# Patient Record
(demographics unavailable — no encounter records)

---

## 2022-08-05 NOTE — DIAGNOSTIC IMAGING REPORT
INDICATION: Elbow pain



COMPARISON: 07/21/2022



TECHNIQUE: 3 radiographs of the left elbow dated 08/05/2022



FINDINGS: No acute fracture or dislocation. No destructive

osseous process. No elbow joint effusion. Small olecranon

enthesophytes. No suspicious radiopaque foreign body. No healing

fracture.



IMPRESSION: No acute osseous abnormality.



No healing fracture identified. In particular, previously

suspected medial proximal ulnar fracture is not definitively

demonstrated.



Dictated by: 



  Dictated on workstation # [id number]

## 2022-08-05 NOTE — DIAGNOSTIC IMAGING REPORT
INDICATION: Dysphagia.



TECHNIQUE: The study was performed in conjunction with Speech

Pathology. Video fluoroscopy was performed during the swallowing

of barium at multiple consistencies. Patient ingested nectar,

applesauce, banana, cracker, as well as thin liquid

consistencies. A total of 4.2 minutes of fluoroscopic time was

utilized.



FINDINGS: No laryngeal penetration or aspiration was observed.

There is normal epiglottic tilt and laryngeal elevation. There is

moderate vallecular residue present; however, this did clear with

a second swallow.



IMPRESSION: Moderate residue, as described. No penetration or

aspiration was observed.



Dictated by: 



  Dictated on workstation # [id number]

## 2022-11-01 NOTE — DIAGNOSTIC IMAGING REPORT
INDICATION:  hypoxia.  



TECHNIQUE:  Single view chest 3:53 AM.



CORRELATION STUDY:  05/23/2022



FINDINGS: 

Given patient rotation, heart size, mediastinum and vasculature

overall within normal limits.  

Lung fields hyperinflated but overall clear.



Old healed right posterior lateral rib fracture deformity.

Apparent interval removal of internal fixation hardware of the

left humerus. There is incompletely healed, perhaps ununited

fracture of the proximal left humerus.



IMPRESSION: 

1. Negative for acute cardiopulmonary abnormality.



Dictated by: 



  Dictated on workstation # [id number]

## 2022-11-01 NOTE — CONSULTATION - SURGERY
YOLANDA PETERSON 11/1/22 0725:


History of Present Illness


History of Present Illness


Patient Consulted On(obinna/time)


11/1/22


 07:18


Date Seen by Provider:  Nov 1, 2022


Time Seen by Provider:  08:20


History of Present Illness


Consult requested per Dr. Rodriguez. Patient is a 87 year old female with past 

medical history (according to patient charts) of dysphagia, cognitive 

communication deficit, history of cerebral infarct, A-fib, hypothyroidism, 

vascular dementia presented from nursing facility via EMS yesterday 10-. 

Nursing staff say she has had brown vomit (coffee ground emesis) and brown 

residuals in her feeding tube for last couple of days. She had x-rays which were

reportedly negative per the nursing home. She also had labs which were 

reportedly normal per nursing home. The patient is nonverbal and thus unable to 

obtain a history from her.  She is not on any blood thinning medications per her

records reviewed. As of this morning 11-1-2022 patient is laying in bed awake. 

Unable to answer any questions. She moans in response to questioning. Was able 

to squeeze my hand upon command, but was unable to follow any other commands. 

She did appear to be in mild distress. Nothing in her PEG tube at this time. 

Will be started on vancomycin and zosyn. Patient is currently on breathing mask 

(4 liters).





Allergies and Home Medications


Allergies


Coded Allergies:  


     melatonin (Verified  Allergy, Unknown, 5/22/22)


     quetiapine (Verified  Adverse Reaction, Unknown, 2/11/19)


   confusion





Patient Home Medication List


Home Medication List Reviewed:  Yes


Acetaminophen (Acetaminophen) 500 Mg Tablet, 1,000 MG PO TID, (Reported)


   Entered as Reported by: MEE ROMAN on 11/1/22 0939


   Last Action: Reviewed


Acetaminophen (Acetaminophen) 325 Mg/10.15 Ml Soln, 20.3 ML PO Q6H PRN for PAIN-

MILD (1-4), (Reported)


   Entered as Reported by: MEE ROMAN on 11/1/22 0939


   Last Action: Reviewed


Alendronate Sodium (Alendronate Sodium) 70 Mg Tablet, 70 MG PO FRI, (Reported)


   Entered as Reported by: MEE ROMAN on 5/24/22 0851


   Last Action: Reviewed


Allopurinol (Allopurinol) 100 Mg Tablet, 100 MG PO DAILY, (Reported)


   Entered as Reported by: MEE ROMAN on 11/1/22 0939


   Last Action: Reviewed


Amlodipine Besylate (Amlodipine Besylate) 5 Mg Tablet, 5 MG PO DAILY, (Reported)


   Entered as Reported by: MEE ROMAN on 11/1/22 0939


   Last Action: Reviewed


Ascorbic Acid (Vitamin C) 1,000 Mg Tablet, 1,000 MG PO DAILY, (Reported)


   Entered as Reported by: MEE ROMAN on 11/1/22 0939


   Last Action: Reviewed


Aspirin (Aspirin) 81 Mg Tab.chew, 81 MG PO HS, (Reported)


   Entered as Reported by: MEE ROMAN on 11/1/22 0939


   Last Action: Reviewed


Carboxymethylcellulose Sodium (Refresh Tears) 0.5 % Drops, 1 DROP OU QID, 

(Reported)


   Entered as Reported by: MEE ROMAN on 11/1/22 0939


   Last Action: Reviewed


Cholecalciferol (Vitamin D3) (Vitamin D3) 125 Mcg (5000 Unit) Capsule, 125 MCG 

PO DAILY, (Reported)


   Entered as Reported by: MEE ROMAN on 5/24/22 0851


   Last Action: Reviewed


Diclofenac Sodium (Diclofenac Sodium) 1 % Gel..gram., 2 GM TOP QID PRN for PAIN-

BREAKTHROUGH, (Reported)


   Entered as Reported by: MEE ROMAN on 11/1/22 0939


   Last Action: Reviewed


Doxazosin Mesylate (Doxazosin Mesylate) 1 Mg Tablet, 1 MG PO DAILY, (Reported)


   Entered as Reported by: MEE ROMAN on 11/1/22 0939


   Last Action: Reviewed


Eyelid Cleanser Combination #5 (Ocusoft Lid Scrub) 1 Each Med..pad, 1 EACH TP 

DAILY, (Reported)


   Entered as Reported by: MEE ROMAN on 11/1/22 0939


   Last Action: Reviewed


Lactulose (Lactulose) 10 Gram/15 Ml Solution, 10 GM PO BID, (Reported)


   Entered as Reported by: MEE ROMAN on 11/1/22 0939


   Last Action: Reviewed


Multivitamin (Multivitamin) 1 Each Tablet, 1 EACH PO DAILY, (Reported)


   Entered as Reported by: MEE ROMAN on 5/24/22 0851


   Last Action: Reviewed


Polyethylene Glycol 3350 (Miralax) 17 Gram Powd.pack, 17 GM PO HS, (Reported)


   Entered as Reported by: MEE ROMAN on 5/24/22 0851


   Last Action: Reviewed


Polymyxin B Sulf/Trimethoprim (Polytrim Eye Drops) 10,000 Unit-1 Mg/Ml Drops, 1 

DROP OU TID, (Reported)


   Entered as Reported by: MEE ROMAN on 5/24/22 0851


   Last Action: Reviewed


Quetiapine Fumarate (Quetiapine Fumarate) 25 Mg Tablet, 12.5 MG PO HS, 

(Reported)


   Entered as Reported by: MEE ROMAN on 11/1/22 0939


   Last Action: Reviewed


Sennosides/Docusate Sodium (Senna Plus Tablet) 8.6 Mg-50 Mg Tablet, 1 EACH PO 

Q12H, (Reported)


   Entered as Reported by: MEE ROMAN on 5/24/22 0851


   Last Action: Reviewed


Sulfamethoxazole/Trimethoprim (Bactrim Ds Tablet) 1 Each Tablet, 1 EACH PO BID, 

(Reported)


   Entered as Reported by: MEE ROMAN on 11/1/22 0939


   Last Action: Reviewed


Discontinued Medications


Acetaminophen (Acetaminophen) 325 Mg/10.15 Ml Soln, 10.15 ML PO Q6H PRN for 

PAIN-MILD (1-4), (Reported)


   Discontinued Reason: Prescription changed


   Entered as Reported by: MEE ROMAN on 5/24/22 0851


Amlodipine Besylate (Amlodipine Besylate) 5 Mg Tablet, 5 MG PO DAILY


   Discontinued Reason: Duplicate Order


   Prescribed by: JASON RODRIGUEZ on 5/27/22 1113


   Last Action: Discontinued


Aspirin (Aspirin) 81 Mg Tab.chew, 81 MG PO DAILY


   Discontinued Reason: Duplicate Order


   Prescribed by: JASON RODRIGUEZ on 5/27/22 1113


   Last Action: Discontinued


Carboxymethylcellulose Sodium (Refresh Tears) 0.5 % Drops, 1 DROP OU DAILY, 

(Reported)


   Discontinued Reason: Duplicate Order


   Entered as Reported by: MEE ROMAN on 5/24/22 0851


   Last Action: Discontinued


Doxazosin Mesylate (Doxazosin Mesylate) 1 Mg Tablet, 1 MG PO DAILY


   Discontinued Reason: No Longer Taking


   Prescribed by: JASON RODRIGUEZ on 5/27/22 1113


   Last Action: Discontinued


Oxycodone HCl (Oxycodone HCl) 5 Mg/5 Ml Solution, 5 ML GT Q4H PRN for PAIN-

SEVERE (8-10)


   Discontinued Reason: No Longer Taking


   Prescribed by: JASON RODRIGUEZ on 5/27/22 1114


   Last Action: Discontinued





Past Medical-Social-Family Hx


Patient Social History


Smoking Status:  Never a Smoker


Recent Hopitalizations:  No


Alcohol Use?:  No


Have you traveled recently?:  No





Immunizations Up To Date


Tetanus Booster (TDap):  Unknown


PED Vaccines UTD:  No


Date of Pneumonia Vaccine:  Oct 1, 2017


Date of Influenza Vaccine:  Oct 1, 2017





Seasonal Allergies


Seasonal Allergies:  No





Surgeries


History of Surgeries:  Yes


Surgeries:  Abdominal





Respiratory


History of Respiratory Disorde:  No





Cardiovascular


History of Cardiac Disorders:  Yes (SEES DR. BUSBY UNSURE WHY)


Cardiac Disorders:  Atrial Fibrillation, Hypertension





Neurological


History of Neurological Disord:  Yes


Neurological Disorders:  Dementia (Vascular), TIA





Reproductive System


Hx Reproductive Disorders:  No


Sexually Transmitted Disease:  No


HIV/AIDS:  No


Female Reproductive Disorders:  Denies





Genitourinary


History of Genitourinary Disor:  Yes


Genitourinary Disorders:  UTI-Chronic





Gastrointestinal


History of Gastrointestinal Di:  No





Musculoskeletal


History of Musculoskeletal Dis:  Yes


Musculoskeletal Disorders:  Fractures, Gout





Endocrine


History of Endocrine Disorders:  Yes


Endocrine Disorders:  Hypothyroidsim





HEENT


History of HEENT Disorders:  Yes


HEENT Disorders:  Cataract


Loss of Vision:  Denies


Hearing Impairment:  Hard of Hearing





Cancer


History of Cancer:  No





Psychosocial


History of Psychiatric Problem:  No


Behavioral Health Disorders:  Depression





Integumentary


History of Skin or Integumenta:  No


Skin/Integumentary Disorders:  Recent Skin Changes





Blood Transfusions


History of Blood Disorders:  No


Adverse Reaction to a Blood Tr:  No





Family Medical History


Significant Family History:  No Pertinent Family Hx, Diabetes, Psychiatric 

Problems


Family Medial History:  


Dementia


  G8 SISTER


Diabetes mellitus


  19 FATHER





Review of Systems-General


ROS-Unable to Obtain:  Unable to obtain at this time due to patient being non 

verbal





Physical Exam-General Problems


Physical Exam


Vital Signs





Vital Signs - First Documented








 11/1/22 11/1/22





 02:44 04:52


 


Temp 36.7 


 


Pulse 103 


 


Resp 20 


 


B/P (MAP) 120/86 (97) 


 


Pulse Ox 77 


 


O2 Delivery Room Air 


 


O2 Flow Rate  5.00





Capillary Refill : Less Than 3 Seconds


General Appearance:  moderate distress, cachetic, thin


Eyes:  Bilateral Eye Conjunctivae Pale, Bilateral Eye Lid Inflammation, 

Bilateral Eye Other (Extra occular movment not fully intact, reduced horizontal 

movement)


HEENT:  No scleral icterus (R), No scleral icterus (L), No pale conjunctivae 

(R), No pale conjunctivae (L)


Neck:  non-tender, supple


Respiratory:  chest non-tender, lungs clear, respiratory distress (Mild)


Cardiovascular:  regular rate, rhythm, no murmur, systolic murmur (3/6 

holosystolic murmur)


Peripheral Pulses:  2+ Radial Pulses (R), 2+ Radial Pulses (L)


Gastrointestinal:  normal bowel sounds, soft, other (Patient did appear tender 

to palpation of abdomen, PEG tube dry and clean)


Rectal:  deferred


Back:  no CVA tenderness; No vertebral tenderness


Extremities:  non-tender, no pedal edema


Neurologic/Psychiatric:  motor weakness, depressed affect, disoriented x 3, 

other (Non verbal, moans in response to questioning )


Skin:  normal color, warm/dry


Lymphatic:  No no adenopathy





Data Review


Labs


Laboratory Tests


11/1/22 02:54: 


Blood Gas Puncture Site LR, Blood Gas Patient Temperature 36.7, Arterial Blood 

pH 7.44H, Arterial Blood Partial Pressure CO2 44, Arterial Blood Partial 

Pressure O2 50L, Arterial Blood HCO3 30H, Arterial Blood Total CO2 31.0, 

Arterial Blood Oxygen Saturation 85L, Arterial Blood Base Excess 5.5H, Jeremi 

Test YES-POS, Blood Gas Ventilator Setting NO, Blood Gas Inspired Oxygen 6L


11/1/22 03:10: 


White Blood Count 20.9H, Red Blood Count 3.72L, Hemoglobin 10.9L, Hematocrit 34L

, Mean Corpuscular Volume 90, Mean Corpuscular Hemoglobin 29, Mean Corpuscular 

Hemoglobin Concent 32, Red Cell Distribution Width 18.5H, Platelet Count 242, 

Mean Platelet Volume 9.9, Immature Granulocyte % (Auto) 1, Neutrophils (%) 

(Auto) 86H, Lymphocytes (%) (Auto) 8L, Monocytes (%) (Auto) 6, Eosinophils (%) 

(Auto) 0, Basophils (%) (Auto) 0, Neutrophils # (Auto) 18.0H, Lymphocytes # 

(Auto) 1.6, Monocytes # (Auto) 1.2H, Eosinophils # (Auto) 0.0, Basophils # (Auto

) 0.0, Immature Granulocyte # (Auto) 0.1, Neutrophils % (Manual) 84, Lymphocytes

% (Manual) 9, Monocytes % (Manual) 7, Blood Morphology Comment NORMAL, 

Prothrombin Time 12.7, INR Comment 0.9, Sodium Level 139, Potassium Level 4.2, 

Chloride Level 100, Carbon Dioxide Level 24, Anion Gap 15H, Blood Urea Nitrogen 

62H, Creatinine 1.78H, Estimat Glomerular Filtration Rate 27, BUN/Creatinine 

Ratio 35, Glucose Level 118H, Calcium Level 14.2*H, Corrected Calcium 14.6H, 

Total Bilirubin 0.6, Aspartate Amino Transf (AST/SGOT) 41H, Alanine 

Aminotransferase (ALT/SGPT) 174H, Alkaline Phosphatase 329H, Total Protein 7.1, 

Albumin 3.5


11/1/22 04:09: 


D-Dimer 2.41H, Procalcitonin 0.94H, Influenza Type A (RT-PCR) Not Detected, 

Influenza Type B (RT-PCR) Not Detected, SARS-CoV-2 RNA (RT-PCR) Not Detected


11/1/22 04:33: 


Urine Color YELLOW, Urine Clarity TURBID, Urine pH 5.0, Urine Specific Gravity 

>=1.030, Urine Protein TRACEH, Urine Glucose (UA) NEGATIVE, Urine Ketones 

NEGATIVE, Urine Nitrite NEGATIVE, Urine Bilirubin NEGATIVE, Urine Urobilinogen 

0.2, Urine Leukocyte Esterase 2+H, Urine RBC (Auto) TRACE-IH, Urine RBC 10-25H, 

Urine WBC >100H, Urine Squamous Epithelial Cells 2-5, Urine Crystals PRESENTH, 

Urine Calcium Oxalate Crystals RAREH, Urine Bacteria FEWH, Urine Casts NONE, 

Urine Mucus SMALLH, Urine Other , Urine Culture Indicated YES





Radiology


Date of Exam:11/01/22





CT ABDOMEN/PELVIS WO








PROCEDURE: CT abdomen and pelvis without contrast.





TECHNIQUE: Multiple contiguous axial images were obtained through


the abdomen and pelvis without the use of intravenous contrast.


Auto Exposure Controls were utilized during the CT exam to meet


ALARA standards for radiation dose reduction. 





INDICATION:  87-year-old female, diffuse abdominal pain, vomiting


black liquid.





CORRELATION STUDY: CT abdomen and pelvis 02/11/2019





FINDINGS:  


LOWER THORAX: Mild tree-in-bud nodularity suggested at the lung


bases. Subpleural nodule right middle lobe approximately 4 mm,


previously 3 mm.





LIVER: Unremarkable at unenhanced assessment.


GALLBLADDER: Small gallstones, otherwise unremarkable. No overt


bile duct dilatation.


SPLEEN: Calcified granuloma, otherwise unremarkable.


PANCREAS: Mildly atrophic.


ADRENAL GLANDS: Unremarkable.


KIDNEYS: Normal configuration.  No calcification or obstruction.


ABDOMINAL AORTA: Moderate wall calcification, nonaneurysmal.





GASTROINTESTINAL TRACT: Gastric feeding tube has been placed


since prior. No small bowel obstruction. Moderate stool in the


proximal colon. Colonic diverticulosis. No abdominal ascites


and/or free air.





URINARY BLADDER: Unremarkable.


REPRODUCTIVE: Uterus and adnexa unremarkable.





OSSEOUS STRUCTURES: Mild anterior wedging T12. Old pubic rami


fractures. Chronic bilateral rib fractures. Chronic left


transverse process fractures at L1-L2.





OTHER:  None.








IMPRESSION:


1. Very mild tree-in-bud nodularity lung bases suspect for


atypical type infection. Very small area of right middle lobe


pulmonary nodule.


2. Interval placement of a gastric feeding tube. No


gastrointestinal tract obstruction.





Assessment/Plan


Possible GI bleed


   Monitor and trend hemoglobin


   Transfuse if needed


   Continue protonix


   Continue with clear liquid diet





Hypoxia


   Continue with oxygen mask (currently on 4 liters)





Leukocytosis


   Started on Vancomycin and Zosyn


   CT showed Tree in Bud nodularity at lung bases





NICKY


   Continue hydration





Elevated Procalcitonin 


PEG tube aspiration


Elevated Liver enzymes


Vascular Dementia-non verbal





Clinical Quality Measures


DVT/VTE Risk/Contraindication:


Contraindications-Pharm:  Other *list below*


Other:  


gi bleed





EULOGIO STEWARD DO 11/1/22 1633:


History of Present Illness


Consult requested by Dr. Rodriguez for possible GI bleed.  Patient 87-year-old 

female who is unable to communicate with me.  This is secondary to dementia.  

Her oldest son is at bedside.  Patient nursing staff states that she has having 

some coffee-ground emesis and also some coming out of her feeding tube the last 

couple of days.  She is unable to provide any information.  Her son states that 

she is also having a little bit of difficulty breathing and was found to be 

hypoxic but with oxygen this did improve.  Patient unable to pass swallow test. 

Ct abdomen/pelvis: 1. Very mild tree-in-bud nodularity lung bases suspect for


atypical type infection. Very small area of right middle lobe


pulmonary nodule.


2. Interval placement of a gastric feeding tube. No


gastrointestinal tract obstruction..





Allergies and Home Medications


Allergies


Coded Allergies:  


     melatonin (Verified  Allergy, Unknown, 5/22/22)


     quetiapine (Verified  Adverse Reaction, Unknown, 2/11/19)


   confusion





Patient Home Medication List


Home Medication List Reviewed:  Yes


Acetaminophen (Acetaminophen) 500 Mg Tablet, 1,000 MG PO TID, (Reported)


   Entered as Reported by: MEE ROMAN on 11/1/22 0939


   Last Action: Reviewed


Acetaminophen (Acetaminophen) 325 Mg/10.15 Ml Soln, 20.3 ML PO Q6H PRN for PAIN-

MILD (1-4), (Reported)


   Entered as Reported by: MEE ROMAN on 11/1/22 0939


   Last Action: Reviewed


Alendronate Sodium (Alendronate Sodium) 70 Mg Tablet, 70 MG PO FRI, (Reported)


   Entered as Reported by: MEE ROMAN on 5/24/22 0851


   Last Action: Reviewed


Allopurinol (Allopurinol) 100 Mg Tablet, 100 MG PO DAILY, (Reported)


   Entered as Reported by: MEE ROMAN on 11/1/22 0939


   Last Action: Reviewed


Amlodipine Besylate (Amlodipine Besylate) 5 Mg Tablet, 5 MG PO DAILY, (Reported)


   Entered as Reported by: MEE ROMAN on 11/1/22 0939


   Last Action: Reviewed


Ascorbic Acid (Vitamin C) 1,000 Mg Tablet, 1,000 MG PO DAILY, (Reported)


   Entered as Reported by: MEE ROMAN on 11/1/22 0939


   Last Action: Reviewed


Aspirin (Aspirin) 81 Mg Tab.chew, 81 MG PO HS, (Reported)


   Entered as Reported by: MEE ROMAN on 11/1/22 0939


   Last Action: Reviewed


Carboxymethylcellulose Sodium (Refresh Tears) 0.5 % Drops, 1 DROP OU QID, 

(Reported)


   Entered as Reported by: MEE ROMAN on 11/1/22 0939


   Last Action: Reviewed


Cholecalciferol (Vitamin D3) (Vitamin D3) 125 Mcg (5000 Unit) Capsule, 125 MCG 

PO DAILY, (Reported)


   Entered as Reported by: MEE ROMAN on 5/24/22 0851


   Last Action: Reviewed


Diclofenac Sodium (Diclofenac Sodium) 1 % Gel..gram., 2 GM TOP QID PRN for PAIN-

BREAKTHROUGH, (Reported)


   Entered as Reported by: MEE ROMAN on 11/1/22 0939


   Last Action: Reviewed


Doxazosin Mesylate (Doxazosin Mesylate) 1 Mg Tablet, 1 MG PO DAILY, (Reported)


   Entered as Reported by: MEE ROMAN on 11/1/22 0939


   Last Action: Reviewed


Eyelid Cleanser Combination #5 (Ocusoft Lid Scrub) 1 Each Med..pad, 1 EACH TP 

DAILY, (Reported)


   Entered as Reported by: MEE ROMAN on 11/1/22 0939


   Last Action: Reviewed


Lactulose (Lactulose) 10 Gram/15 Ml Solution, 10 GM PO BID, (Reported)


   Entered as Reported by: MEE ROMAN on 11/1/22 0939


   Last Action: Reviewed


Multivitamin (Multivitamin) 1 Each Tablet, 1 EACH PO DAILY, (Reported)


   Entered as Reported by: MEE ROMAN on 5/24/22 0851


   Last Action: Reviewed


Polyethylene Glycol 3350 (Miralax) 17 Gram Powd.pack, 17 GM PO HS, (Reported)


   Entered as Reported by: MEE ROMAN on 5/24/22 0851


   Last Action: Reviewed


Polymyxin B Sulf/Trimethoprim (Polytrim Eye Drops) 10,000 Unit-1 Mg/Ml Drops, 1 

DROP OU TID, (Reported)


   Entered as Reported by: MEE ROMAN on 5/24/22 0851


   Last Action: Reviewed


Quetiapine Fumarate (Quetiapine Fumarate) 25 Mg Tablet, 12.5 MG PO HS, 

(Reported)


   Entered as Reported by: MEE ROMAN on 11/1/22 0939


   Last Action: Reviewed


Sennosides/Docusate Sodium (Senna Plus Tablet) 8.6 Mg-50 Mg Tablet, 1 EACH PO 

Q12H, (Reported)


   Entered as Reported by: MEE ROMAN on 5/24/22 0851


   Last Action: Reviewed


Sulfamethoxazole/Trimethoprim (Bactrim Ds Tablet) 1 Each Tablet, 1 EACH PO BID, 

(Reported)


   Entered as Reported by: MEE ROMAN on 11/1/22 0939


   Last Action: Reviewed


Discontinued Medications


Acetaminophen (Acetaminophen) 325 Mg/10.15 Ml Soln, 10.15 ML PO Q6H PRN for 

PAIN-MILD (1-4), (Reported)


   Discontinued Reason: Prescription changed


   Entered as Reported by: MEE ROMAN on 5/24/22 0851


Amlodipine Besylate (Amlodipine Besylate) 5 Mg Tablet, 5 MG PO DAILY


   Discontinued Reason: Duplicate Order


   Prescribed by: JASON RODRIGUEZ on 5/27/22 1113


   Last Action: Discontinued


Aspirin (Aspirin) 81 Mg Tab.chew, 81 MG PO DAILY


   Discontinued Reason: Duplicate Order


   Prescribed by: JASON RODRIGUEZ on 5/27/22 1113


   Last Action: Discontinued


Carboxymethylcellulose Sodium (Refresh Tears) 0.5 % Drops, 1 DROP OU DAILY, 

(Reported)


   Discontinued Reason: Duplicate Order


   Entered as Reported by: MEE ROMAN on 5/24/22 0851


   Last Action: Discontinued


Doxazosin Mesylate (Doxazosin Mesylate) 1 Mg Tablet, 1 MG PO DAILY


   Discontinued Reason: No Longer Taking


   Prescribed by: JASON RODRIGUEZ on 5/27/22 1113


   Last Action: Discontinued


Oxycodone HCl (Oxycodone HCl) 5 Mg/5 Ml Solution, 5 ML GT Q4H PRN for PAIN-

SEVERE (8-10)


   Discontinued Reason: No Longer Taking


   Prescribed by: JASON RODRIGUEZ on 5/27/22 1114


   Last Action: Discontinued





Past Medical-Social-Family Hx


Reviewed Nursing Assessment


Reviewed/Agree w Nursing PMH:  Yes





Family Medical History


Significant Family History:  No Pertinent Family Hx


Family Medial History:  


Dementia


  G8 SISTER


Diabetes mellitus


  19 FATHER





Review of Systems-General


ROS-Unable to Obtain:  unable to obtaine due to patient condition





Physical Exam-General Problems


Physical Exam


General Appearance:  cachetic, thin (laying in bed); 


   No moderate distress


HEENT:  PERRL/EOMI; No scleral icterus (R), No scleral icterus (L)


Neck:  non-tender, supple


Respiratory:  chest non-tender, no respiratory distress, no accessory muscle use


Cardiovascular:  regular rate, rhythm, no JVD


Gastrointestinal:  soft, other (gastrostomy tube luq)


Rectal:  deferred


Back:  no CVA tenderness, no vertebral tenderness; No vertebral tenderness


Extremities:  non-tender, no pedal edema


Neurologic/Psychiatric:  alert, motor weakness, disoriented x 3, other (Non 

verbal, moans in response to questioning )


Skin:  normal color, warm/dry


Lymphatic:  No no adenopathy





Assessment/Plan


Hypoxia


GI bleed likely upper 


Atypical lung infection b/l


Dementia-vascular





Patient no active signs of bleeding except some black output from gastrostomy 

tube.  


Protonix


Elevated procalcitonin and lung base changes likely  atypical infection- 

continue abx,  consider Ct of chest if not improving


Elevated liver enzymes and leukocytosis, repeat labs and follow


No surgical intervention at this time. .





Supervisory-Addendum Brief


Verification & Attestation


Participated in pt care:  history, MDM, physical


Personally performed:  exam, history, MDM, supervision of care


Care discussed with:  Medical Student


Procedures:  n/a


Results interpretation:  Verified all documentation


Verification and Attestation of Medical Student E/M Service





A medical student performed and documented this service in my presence. I 

reviewed and verified all information documented by the medical student and made

modifications to such information, when appropriate. I personally performed the 

physical exam and medical decision making. 





 Eulogio Steward, Nov 1, 2022,16:41











YOLANDA PETERSON                 Nov 1, 2022 07:25


EULOGIO STEWARD DO               Nov 1, 2022 16:33

## 2022-11-01 NOTE — ED GI
General


Chief Complaint:  Abdominal/GI Problems


Stated Complaint:  VOMITING BLACK LIQUID


Source of Information:  EMS





History of Present Illness


Date Seen by Provider:  Nov 1, 2022


Time Seen by Provider:  02:50


Initial Comments


87-year-old female presents wanting nursing facility via EMS.  Nursing staff say

she has had brown vomit and brown residuals in her feeding tube for last couple 

of days.  She had x-rays which were reportedly negative.  She is also had labs 

which were reportedly normal.  The patient is nonverbal so unable to obtain a 

history from her.  She is not on any blood thinning medications per her records 

reviewed





Allergies and Home Medications


Allergies


Coded Allergies:  


     melatonin (Verified  Allergy, Unknown, 5/22/22)


     quetiapine (Verified  Adverse Reaction, Unknown, 2/11/19)


   confusion





Patient Home Medication List


Home Medication List Reviewed:  Yes


Acetaminophen (Acetaminophen) 325 Mg/10.15 Ml Soln, 10.15 ML PO Q6H PRN for 

PAIN-MILD (1-4), (Reported)


   Entered as Reported by: MEE ROMAN on 5/24/22 0851


Alendronate Sodium (Alendronate Sodium) 70 Mg Tablet, 70 MG PO FRI, (Reported)


   Entered as Reported by: MEE ROMAN on 5/24/22 0851


Amlodipine Besylate (Amlodipine Besylate) 5 Mg Tablet, 5 MG PO DAILY


   Prescribed by: JASON RODRIGUEZ on 5/27/22 1113


Aspirin (Aspirin) 81 Mg Tab.chew, 81 MG PO DAILY


   Prescribed by: JASON RODRIGUEZ on 5/27/22 1113


Carboxymethylcellulose Sodium (Refresh Tears) 0.5 % Drops, 1 DROP OU DAILY, 

(Reported)


   Entered as Reported by: MEE ROMAN on 5/24/22 0851


Cholecalciferol (Vitamin D3) (Vitamin D3) 125 Mcg (5000 Unit) Capsule, 125 MCG 

PO DAILY, (Reported)


   Entered as Reported by: MEE ROMAN on 5/24/22 0851


Doxazosin Mesylate (Doxazosin Mesylate) 1 Mg Tablet, 1 MG PO DAILY


   Prescribed by: JASON RODRIGUEZ on 5/27/22 1113


Multivitamin (Multivitamin) 1 Each Tablet, 1 EACH PO DAILY, (Reported)


   Entered as Reported by: MEE ROMAN on 5/24/22 0851


Oxycodone HCl (Oxycodone HCl) 5 Mg/5 Ml Solution, 5 ML GT Q4H PRN for PAIN-

SEVERE (8-10)


   Prescribed by: JASON RODRIGUEZ on 5/27/22 1114


Polyethylene Glycol 3350 (Miralax) 17 Gram Powd.pack, 17 GM PO HS, (Reported)


   Entered as Reported by: MEE ROMAN on 5/24/22 0851


Polymyxin B Sulf/Trimethoprim (Polytrim Eye Drops) 10,000 Unit-1 Mg/Ml Drops, 1 

DROP OU TID, (Reported)


   Entered as Reported by: MEE ROMAN on 5/24/22 0851


Sennosides/Docusate Sodium (Senna Plus Tablet) 8.6 Mg-50 Mg Tablet, 1 EACH PO 

BID, (Reported)


   Entered as Reported by: MEE ROMAN on 5/24/22 0851





Review of Systems


Review of Systems


Constitutional:  other (Unable to obtain review of systems as patient is 

nonverbal)





Past Medical-Social-Family Hx


Patient Social History


Tobacco Use?:  No


Use of E-Cig and/or Vaping dev:  No


Substance use?:  No


Alcohol Use?:  No





Immunizations Up To Date


Tetanus Booster (TDap):  Unknown


PED Vaccines UTD:  No


First/Initial COVID19 Vaccinat:  YES


Second COVID19 Vaccination Brayden:  YES


Third COVID19 Vaccination Date:  YES





Seasonal Allergies


Seasonal Allergies:  No





Past Medical History


Surgery/Hospitalization HX:  


DEMENTIA, A-FIB,CAD, HTN, HYPOTHYROIDISM, HYPERLIPIDEMIA


Surgeries:  Yes


Abdominal


Respiratory:  No


Currently Using CPAP:  No


Currently Using BIPAP:  No


Cardiac:  Yes (SEES DR. BUSBY UNSURE WHY)


Hypertension


Neurological:  Yes


Dementia


Reproductive Disorders:  No


Female Reproductive Disorders:  Denies


Sexually Transmitted Disease:  No


HIV/AIDS:  No


Genitourinary:  Yes


UTI-Chronic


Gastrointestinal:  No


Musculoskeletal:  Yes


Fractures, Gout


Endocrine:  Yes


Hyperthyroidism


HEENT:  Yes


Cataract


Loss of Vision:  Denies


Hearing Impairment:  Hard of Hearing


Cancer:  No


Psychosocial:  No


Integumentary:  No


Recent Skin Changes


Blood Disorders:  No


Adverse Reaction/Blood Tranf:  No





Family Medical History


Reviewed Nursing Family Hx





Dementia


  G8 SISTER


Diabetes mellitus


  19 FATHER


No Pertinent Family Hx, Diabetes, Psychiatric Problems





Physical Exam


Vital Signs





Vital Signs - First Documented








 11/1/22 11/1/22





 02:44 04:52


 


Temp 36.7 


 


Pulse 103 


 


Resp 20 


 


B/P (MAP) 120/86 (97) 


 


Pulse Ox 77 


 


O2 Delivery Room Air 


 


O2 Flow Rate  5.00





Capillary Refill :


Height/Weight/BMI


Height: 5'4.00"


Weight: 117lbs. 8.0oz. [id number]; 21.00 BMI


Method:Stated


General Appearance:  no apparent distress


HEENT:  PERRL/EOMI, normal ENT inspection, pharynx normal


Neck:  non-tender, supple, normal inspection


Respiratory:  chest non-tender, lungs clear, normal breath sounds, no 

respiratory distress


Cardiovascular:  regular rate, rhythm, systolic murmur (3/6 holosystolic murmur 

best at left sternal border)


Gastrointestinal:  normal bowel sounds, soft, no organomegaly, other (Feeding 

tube in mid abdomen with clear and black drainage.  Patient appears to have 

diffuse tenderness with voluntary guarding about her abdomen.)


Extremities:  normal range of motion, non-tender, normal inspection, no calf 

tenderness


Neurologic/Psychiatric:  other (Patient is nonverbal.  She is alert)


Skin:  normal color, warm/dry


Lymphatic:  no adenopathy





Procedures/Interventions





   Suture Size:  5-0





Progress/Results/Core Measures


Results/Orders


Lab Results





Laboratory Tests








Test


 11/1/22


02:54 11/1/22


03:10 11/1/22


04:09 11/1/22


04:33 Range/Units


 


 


Blood Gas Puncture Site LR      


 


Blood Gas Patient Temperature 36.7      


 


Arterial Blood pH 7.44 H    7.37-7.43  


 


Arterial Blood Partial


Pressure CO2 44 


 


 


 


 35-45  MMHG





 


Arterial Blood Partial


Pressure O2 50 L


 


 


 


 79-93  MMHG





 


Arterial Blood HCO3 30 H    23-27  MMOL/L


 


Arterial Blood Total CO2


 31.0 


 


 


 


 21.0-31.0


MMOL/L


 


Arterial Blood Oxygen


Saturation 85 L


 


 


 


   %





 


Arterial Blood Base Excess


 5.5 H


 


 


 


 -2.5-2.5


MMOL/L


 


Jeremi Test YES-POS      


 


Blood Gas Ventilator Setting NO      


 


Blood Gas Inspired Oxygen 6L      


 


White Blood Count


 


 20.9 H


 


 


 4.3-11.0


10^3/uL


 


Red Blood Count


 


 3.72 L


 


 


 3.80-5.11


10^6/uL


 


Hemoglobin  10.9 L   11.5-16.0  g/dL


 


Hematocrit  34 L   35-52  %


 


Mean Corpuscular Volume  90    80-99  fL


 


Mean Corpuscular Hemoglobin  29    25-34  pg


 


Mean Corpuscular Hemoglobin


Concent 


 32 


 


 


 32-36  g/dL





 


Red Cell Distribution Width  18.5 H   10.0-14.5  %


 


Platelet Count


 


 242 


 


 


 130-400


10^3/uL


 


Mean Platelet Volume  9.9    9.0-12.2  fL


 


Immature Granulocyte % (Auto)  1     %


 


Neutrophils (%) (Auto)  86 H   42-75  %


 


Lymphocytes (%) (Auto)  8 L   12-44  %


 


Monocytes (%) (Auto)  6    0-12  %


 


Eosinophils (%) (Auto)  0    0-10  %


 


Basophils (%) (Auto)  0    0-10  %


 


Neutrophils # (Auto)


 


 18.0 H


 


 


 1.8-7.8


10^3/uL


 


Lymphocytes # (Auto)


 


 1.6 


 


 


 1.0-4.0


10^3/uL


 


Monocytes # (Auto)


 


 1.2 H


 


 


 0.0-1.0


10^3/uL


 


Eosinophils # (Auto)


 


 0.0 


 


 


 0.0-0.3


10^3/uL


 


Basophils # (Auto)


 


 0.0 


 


 


 0.0-0.1


10^3/uL


 


Immature Granulocyte # (Auto)


 


 0.1 


 


 


 0.0-0.1


10^3/uL


 


Neutrophils % (Manual)  84     %


 


Lymphocytes % (Manual)  9     %


 


Monocytes % (Manual)  7     %


 


Blood Morphology Comment  NORMAL     


 


Prothrombin Time  12.7    12.2-14.7  SEC


 


INR Comment  0.9    0.8-1.4  


 


Sodium Level  139    135-145  MMOL/L


 


Potassium Level  4.2    3.6-5.0  MMOL/L


 


Chloride Level  100      MMOL/L


 


Carbon Dioxide Level  24    21-32  MMOL/L


 


Anion Gap  15 H   5-14  MMOL/L


 


Blood Urea Nitrogen  62 H   7-18  MG/DL


 


Creatinine


 


 1.78 H


 


 


 0.60-1.30


MG/DL


 


Estimat Glomerular Filtration


Rate 


 27 


 


 


  





 


BUN/Creatinine Ratio  35     


 


Glucose Level  118 H     MG/DL


 


Calcium Level  14.2 *H   8.5-10.1  MG/DL


 


Corrected Calcium  14.6 H   8.5-10.1  MG/DL


 


Total Bilirubin  0.6    0.1-1.0  MG/DL


 


Aspartate Amino Transf


(AST/SGOT) 


 41 H


 


 


 5-34  U/L





 


Alanine Aminotransferase


(ALT/SGPT) 


 174 H


 


 


 0-55  U/L





 


Alkaline Phosphatase  329 H     U/L


 


Total Protein  7.1    6.4-8.2  GM/DL


 


Albumin  3.5    3.2-4.5  GM/DL


 


D-Dimer


 


 


 2.41 H


 


 0.00-0.49


UG/ML


 


Procalcitonin   0.94 H  <0.10  NG/ML


 


Influenza Type A (RT-PCR)   Not Detected   Not Detecte  


 


Influenza Type B (RT-PCR)   Not Detected   Not Detecte  


 


SARS-CoV-2 RNA (RT-PCR)   Not Detected   Not Detecte  


 


Urine Color    YELLOW   


 


Urine Clarity    TURBID   


 


Urine pH    5.0  5-9  


 


Urine Specific Gravity    >=1.030  1.016-1.022  


 


Urine Protein    TRACE H NEGATIVE  


 


Urine Glucose (UA)    NEGATIVE  NEGATIVE  


 


Urine Ketones    NEGATIVE  NEGATIVE  


 


Urine Nitrite    NEGATIVE  NEGATIVE  


 


Urine Bilirubin    NEGATIVE  NEGATIVE  


 


Urine Urobilinogen    0.2  < = 1.0  MG/DL


 


Urine Leukocyte Esterase    2+ H NEGATIVE  


 


Urine RBC (Auto)    TRACE-I H NEGATIVE  


 


Urine RBC    10-25 H  /HPF


 


Urine WBC    >100 H  /HPF


 


Urine Squamous Epithelial


Cells 


 


 


 2-5 


  /HPF





 


Urine Crystals    PRESENT H  /LPF


 


Urine Calcium Oxalate Crystals    RARE H  /LPF


 


Urine Bacteria    FEW H  /HPF


 


Urine Casts    NONE   /LPF


 


Urine Mucus    SMALL H  /LPF


 


Urine Other       /HPF


 


Urine Culture Indicated    YES   








My Orders





Orders - JULIETA WALTER DO


Comprehensive Metabolic Panel (11/1/22 02:51)


Abo Rh Type (11/1/22 02:51)


Cbc With Automated Diff (11/1/22 02:51)


Arterial Blood Gas (11/1/22 02:52)


Chest 1 View, Ap/Pa Only (11/1/22 02:52)


Protime With Inr (11/1/22 03:01)


Ct Abdomen/Pelvis Wo (11/1/22 03:01)


Manual Differential (11/1/22 03:10)


Covid 19 Inhouse Test (11/1/22 04:00)


Influenza A And B By Pcr (11/1/22 04:00)


Ua Culture If Indicated (11/1/22 04:03)


Fibrin Degradation Products (11/1/22 04:06)


Procalcitonin (Pct) (11/1/22 04:06)


Vancomycin Injection (Vancomycin Injecti (11/1/22 04:15)


Ns Iv 500 Ml (Sodium Chloride 0.9%) (11/1/22 04:15)


Aguirre Cath (11/1/22 04:13)


Lidocaine 2% (Urojet) (Xylocaine Urojet) (11/1/22 04:15)


Ed Admission (Communication) (11/1/22 04:39)


Urine Culture (11/1/22 04:33)





Medications Given in ED





Current Medications








 Medications  Dose


 Ordered  Sig/Familia


 Route  Start Time


 Stop Time Status Last Admin


Dose Admin


 


 Lidocaine HCl  10 ml  ONCE  ONCE


 TOP  11/1/22 04:15


 11/1/22 04:17 DC 11/1/22 04:25


10 ML


 


 Vancomycin HCl


 1000 mg/Sodium


 Chloride  250 ml @ 


 250 mls/hr  ONCE  ONCE


 IV  11/1/22 04:15


 11/1/22 05:14  11/1/22 04:25


250 MLS/HR








Vital Signs/I&O











 11/1/22 11/1/22





 02:44 04:52


 


Temp 36.7 


 


Pulse 103 97


 


Resp 20 20


 


B/P (MAP) 120/86 (97) 145/84


 


Pulse Ox 77 96


 


O2 Delivery Room Air OxyMask


 


O2 Flow Rate  5.00











Departure


Communication (Admissions)


Patient's hypoxia improved with a simple mask.  Chest x-ray improved tree-in-bud

infiltrates in the bases bilaterally.  Started on Zosyn, vancomycin especially 

in light of her significant leukocytosis.  Her abdomen is tender on exam, she is

nonverbal so I would not do a CT scan which is negative for any acute intra-

abdominal pathology.  She does have some black fluid in her feeding tube when 

aspirating.  It does appear to be mixed with clear stomach contents.  Unsure of 

the significance at this time.  Related to Dr. Rodriguez and otherwise stable 

condition.





Impression





   Primary Impression:  


   Hypoxia


   Additional Impression:  


   Leukocytosis


   Qualified Codes:  D72.829 - Elevated white blood cell count, unspecified


Disposition:  09 ADMITTED AS INPATIENT


Condition:  Stable





Admissions


Decision to Admit Reason:  Admit from ER (General)





Departure-Patient Inst.


Referrals:  


NICOLASA MACHADO DO (PCP/Family)


Primary Care Physician











JULIETA WALTER DO             Nov 1, 2022 03:01

## 2022-11-01 NOTE — SPEECH THERAPY PROGRESS NOTE
Therapy Progress Note


Speech pathology received clinical bedside swallowing evaluation consultation 

and the chart was reviewed. At this time the patient is receiving supplemental 

oxygen at a rate of 4L via oxy-mask. The patient intermittently makes eye 

contact with the clinician when her name is spoken and moves continuously in the

bed. Due to the patient's current presentation, the clinician is not comfortable

evaluating the patient or providing P.O. trials at this time. The patient's RN 

stated she would attempt to transfer the patient to nasal cannula and get the 

patient seated upright to assess for stability.





Prior to P.O. attempts, the patient will need to be tolerating nasal cannula. 

The clinician provided the RN with the information. The RN requested information

on how the patient should receive her medication. At this time, the clinician 

would recommend the patient remain N.P.O. due to documented concern for 

aspiration. The patient does have a PEG tube present. If deemed appropriate for 

use by the physician, the clinician would recommend utilization of the PEG tube.





The patient has a suspected family member at bedside and the patient's speech 

language pathologist from her facility ([facility]). The patient's 

speech language pathologist from her facility is asking multiple questions of 

the RN. This clinician is not addressed throughout her time with the patient. 

The family member present does not ask questions. This clinician will re-attempt

the assessment as the patient is appropriate.











JESSE CARLOS                Nov 1, 2022 10:09

## 2022-11-01 NOTE — ST DYSPHAGIA EVALUATION
Speech Evaluation-General


Medical Diagnosis


Hypoxia


Onset Date:  Oct 31, 2022





Therapy Diagnosis


Therapy Diagnosis:  Suspected Oropharyngeal Dysphagia





Precautions


Precautions:  Fall, Pressure Ulcer, Aspiration


Precautions/Isolations:  Aspiration, Fall Prevention, Standard Precautions, 

Pressure Ulcer





Referral


Referring Physician:  Dr. Hewitt


Reason for Referral:  Evaluation/Treatment





Medical History


Pertinent Medical History:  Arthritis, Dementia, HTN


Current History


The patient is an 87 year-old female with a past medical history significant for

dysphagia, dementia, HTN, CAD, atrial fibrillation, hypothyroidism, 

hyperlipidemia, UTI, and hard of hearing, who presents to the emergency 

department via EMS following brown residuals from her PEG tube.





CT Abdomen/Pelvis: 11/1/22: 1. Very mild tree-in-bud nodularity lung bases 

suspect for atypical type infection. Very small area of right middle lobe 

pulmonary nodule. 2. Interval placement of a gastric feeding tube. No 

gastrointestinal tract obstruction.





Speech PLF/Current-Dysphagia


Prior Level of Function


Prior to admission, the patient was receiving a mechanically altered diet 

consistency with thin liquids. The patient completed a modified barium swallow 

evaluation with this clinician on 8/5/2022. At that time, no aspiration or 

penetration were viewed with any consistency tested. Full supervision and 

feeding were recommended by this clinician for P.O. intake.





Subjective


The patient was lying in bed, eyes opened, upon entrance to her room by the 

clinician. The patient intermittently made eye contact with the clinician when 

her name was spoken. The patient was seated upright in bed for safe swallowing.





Cognitive Status


Patient Orientation:  Unable to Assess





Oral Motor Skills


Dentition:  Natural


Current Food Consistancy:  Clear Liquids


Ability to Follow Directions:  Poor


The patient rarely followed verbal commands with direct modeling provided by the

clinician. Due to her hard of hearing status, elevated vocal intensity was 

provided by the clinician.


Oral Expression Ability:  Severe Impairment





Face


Facial Symmetry:  Asymmetrical





Oral-Facial Assessment


Oral-Facial Dentition:  Normal


Labial Seal Description:  Weak, Poor Coordination


Lingual Protrusion:  Normal


Lingual Strength:  Abnormal (Weak, bilaterally.)


Volitional Dry Swallow:  No


Voluntary Cough:  No


Can Clear Throat Volitionally:  No


Productive Cough:  No


Productive Throat Clear:  No





Dysphagia Evaluation


Consistencies Presented:  Thin Liquid (One ice chip, three half teaspoons, one 

bite size of Jell-O.)


Oral Phase:  Anterior Spillage, Absent Oral Transit


The patient displayed the ability to round her lips and remove the thin liquids 

from the teaspoon. The patient orally held all boluses provided, displaying 

anterior spillage of each consistency. Limited posterior transfer was achieved. 

Maximum verbal prompting was required for limited participation.


Maximum verbal cueing and direct modeling were required by the clinician. 

Significantly delayed onset of the pharyngeal swallow was present with gentle 

tactile stimulation and palpation of the thyroid notch. Immediate, rigorous 

coughing was present with each bolus provided.


Dietary Recommendations:  NPO


Liquid Recommendations:  NPO





Recommendations:


- The patient should remain N.P.O.


- If deemed appropriate by the physician, the patient should receive nutrition, 

hydration, and medication via PEG tube.


- Frequent and excellent oral care to reduce the transfer of oral bacteria to 

the lungs should aspiration of secretions occur.


- Speech pathology to re-assess the oropharyngeal swallowing function, as 

appropriate.





Due to the patient's history of fluctuating aspiration, the patient's current 

level of lethargy, and inability to consistency follow verbal instructions, the 

clinician suspects a high risk of aspiration with P.O. intake. Prior to 

recommendations of an oral diet, speech pathology would recommend a modified 

barium swallow evaluation when the patient is appropriate and able to 

participate. 





The above recommendations were provided to the RN at completion of the study. 

The patient's lunch tray was removed from bedside by the speech pathologist and 

the in-room white board was updated to read N.P.O.


Dysphagia Evaluation Summary


The patient demonstrated suspected oropharyngeal dysphagia characterized by 

decreased lingual and labial range of motion and strength, a suspected delayed 

onset of the pharyngeal swallow function, and poor airway protection in the 

presence of bolus material.





Speech Short Term Goals


Short Term Goals


Short Term Goals


1. The patient will tolerate trials of the least restrictive consistency without

s/s of suspected aspiration.


Time Frame-STG:  Two Days.





Speech Long Term Goals


Long Term Goals


1. The patient will demonstrate tolerance of the least restrictive diet 

consistency without s/s of suspected aspiration.


Time Frame:  Six Weeks.





Speech-Plan


Treatment Plan


Speech Therapy Treatment Plan:  Continue Plan of Care


Treatment Duration:  Nov 4, 2022


Frequency:  2 times per week


Estimated Hrs Per Day:  .25 hour per day


Rehab Potential:  Poor





Safety Risks/Education


Teaching Recipient:  Patient


Teaching Methods:  Discussion


Response to Teaching:  Unable to Comprehend


Education Topics Provided:  


Results, Recommendations, Plan of Care





Discharge Recommendations


24 Hour Supervision





Time


Speech Therapy Time In:  13:25


Speech Therapy Time Out:  13:45


Total Billed Time:  20


Billed Treatment Time


1, ALEXEVS, JESSE Reese                Nov 1, 2022 13:48

## 2022-11-01 NOTE — HISTORY & PHYSICAL
MARKEL GOODWIN 11/1/22 1045:


History of Present Illness


History of Present Illness


Reason for visit/HPI


Patient is an 87-year-old female with a history of vascular dementia, HTN, and 

Afib who presented to the ED by EMS from the nursing home on 10/31. Nursing 

staff report that there was brown vomit and brown residuals in her feeding tube.

Unable to obtain a history due to patient being nonverbal. The patient is able 

to squeeze a hand on command, but is unable to answer any questions. Per ED, 

patient had some black fluid mixed with clear stomach contents in her PEG tube 

on aspiration. Patient's oxygen saturation was 77% on admission, but improved to

96% with 4L of oxygen.


Date of Admission


Nov 1, 2022 at 04:41


Date Seen by a Provider:  Nov 1, 2022


Time Seen by a Provider:  08:20


I consulted on this patient on


11/1/22


 10:39


Attending Physician


Bryant Hare DO


Admitting Physician


Admitting Physician:


Rachelle Rodriguez DO 








Attending Physician:


Rachelle Rodriguez DO


Consult








Allergies and Home Medications


Allergies


Coded Allergies:  


     melatonin (Verified  Allergy, Unknown, 5/22/22)


     quetiapine (Verified  Adverse Reaction, Unknown, 2/11/19)


   confusion





Patient Home Medication List


Home Medication List Reviewed:  Yes


Acetaminophen (Acetaminophen) 500 Mg Tablet, 1,000 MG PO TID, (Reported)


   Entered as Reported by: MEE ROMAN on 11/1/22 0939


   Last Action: Reviewed


Acetaminophen (Acetaminophen) 325 Mg/10.15 Ml Soln, 20.3 ML PO Q6H PRN for PAIN-

MILD (1-4), (Reported)


   Entered as Reported by: MEE ROMAN on 11/1/22 0939


   Last Action: Reviewed


Alendronate Sodium (Alendronate Sodium) 70 Mg Tablet, 70 MG PO FRI, (Reported)


   Entered as Reported by: MEE ROMAN on 5/24/22 0851


   Last Action: Reviewed


Allopurinol (Allopurinol) 100 Mg Tablet, 100 MG PO DAILY, (Reported)


   Entered as Reported by: MEE ROMAN on 11/1/22 0939


   Last Action: Reviewed


Amlodipine Besylate (Amlodipine Besylate) 5 Mg Tablet, 5 MG PO DAILY, (Reported)


   Entered as Reported by: MEE ROMAN on 11/1/22 0939


   Last Action: Reviewed


Ascorbic Acid (Vitamin C) 1,000 Mg Tablet, 1,000 MG PO DAILY, (Reported)


   Entered as Reported by: MEE ROMAN on 11/1/22 0939


   Last Action: Reviewed


Aspirin (Aspirin) 81 Mg Tab.chew, 81 MG PO HS, (Reported)


   Entered as Reported by: MEE ROMAN on 11/1/22 0939


   Last Action: Reviewed


Carboxymethylcellulose Sodium (Refresh Tears) 0.5 % Drops, 1 DROP OU QID, 

(Reported)


   Entered as Reported by: MEE ROMAN on 11/1/22 0939


   Last Action: Reviewed


Cholecalciferol (Vitamin D3) (Vitamin D3) 125 Mcg (5000 Unit) Capsule, 125 MCG 

PO DAILY, (Reported)


   Entered as Reported by: MEE ROMAN on 5/24/22 0851


   Last Action: Reviewed


Diclofenac Sodium (Diclofenac Sodium) 1 % Gel..gram., 2 GM TOP QID PRN for PAIN-

BREAKTHROUGH, (Reported)


   Entered as Reported by: MEE ROMAN on 11/1/22 0939


   Last Action: Reviewed


Doxazosin Mesylate (Doxazosin Mesylate) 1 Mg Tablet, 1 MG PO DAILY, (Reported)


   Entered as Reported by: MEE ROMAN on 11/1/22 0939


   Last Action: Reviewed


Eyelid Cleanser Combination #5 (Ocusoft Lid Scrub) 1 Each Med..pad, 1 EACH TP 

DAILY, (Reported)


   Entered as Reported by: MEE ROMAN on 11/1/22 0939


   Last Action: Reviewed


Lactulose (Lactulose) 10 Gram/15 Ml Solution, 10 GM PO BID, (Reported)


   Entered as Reported by: MEE ROMAN on 11/1/22 0939


   Last Action: Reviewed


Multivitamin (Multivitamin) 1 Each Tablet, 1 EACH PO DAILY, (Reported)


   Entered as Reported by: MEE ROMAN on 5/24/22 0851


   Last Action: Reviewed


Polyethylene Glycol 3350 (Miralax) 17 Gram Powd.pack, 17 GM PO HS, (Reported)


   Entered as Reported by: MEE ROMAN on 5/24/22 0851


   Last Action: Reviewed


Polymyxin B Sulf/Trimethoprim (Polytrim Eye Drops) 10,000 Unit-1 Mg/Ml Drops, 1 

DROP OU TID, (Reported)


   Entered as Reported by: MEE ROMAN on 5/24/22 0851


   Last Action: Reviewed


Quetiapine Fumarate (Quetiapine Fumarate) 25 Mg Tablet, 12.5 MG PO HS, 

(Reported)


   Entered as Reported by: MEE ROMAN on 11/1/22 0939


   Last Action: Reviewed


Sennosides/Docusate Sodium (Senna Plus Tablet) 8.6 Mg-50 Mg Tablet, 1 EACH PO 

Q12H, (Reported)


   Entered as Reported by: MEE ROMAN on 5/24/22 0851


   Last Action: Reviewed


Sulfamethoxazole/Trimethoprim (Bactrim Ds Tablet) 1 Each Tablet, 1 EACH PO BID, 

(Reported)


   Entered as Reported by: MEE ROMAN on 11/1/22 0939


   Last Action: Reviewed


Discontinued Medications


Acetaminophen (Acetaminophen) 325 Mg/10.15 Ml Soln, 10.15 ML PO Q6H PRN for 

PAIN-MILD (1-4), (Reported)


   Discontinued Reason: Prescription changed


   Entered as Reported by: MEE ROMAN on 5/24/22 0851


Amlodipine Besylate (Amlodipine Besylate) 5 Mg Tablet, 5 MG PO DAILY


   Discontinued Reason: Duplicate Order


   Prescribed by: RACHELLE RODRIGUEZ on 5/27/22 1113


   Last Action: Discontinued


Aspirin (Aspirin) 81 Mg Tab.chew, 81 MG PO DAILY


   Discontinued Reason: Duplicate Order


   Prescribed by: RACHELLE RODRIGUEZ on 5/27/22 1113


   Last Action: Discontinued


Carboxymethylcellulose Sodium (Refresh Tears) 0.5 % Drops, 1 DROP OU DAILY, 

(Reported)


   Discontinued Reason: Duplicate Order


   Entered as Reported by: MEE ROMAN on 5/24/22 0851


   Last Action: Discontinued


Doxazosin Mesylate (Doxazosin Mesylate) 1 Mg Tablet, 1 MG PO DAILY


   Discontinued Reason: No Longer Taking


   Prescribed by: RACHELLE RODRIGUEZ on 5/27/22 1113


   Last Action: Discontinued


Oxycodone HCl (Oxycodone HCl) 5 Mg/5 Ml Solution, 5 ML GT Q4H PRN for PAIN-

SEVERE (8-10)


   Discontinued Reason: No Longer Taking


   Prescribed by: RACHELLE RODRIGUEZ on 5/27/22 1114


   Last Action: Discontinued





Past Medical-Social-Family Hx


Patient Social History


Tobacco Use?:  No


Smoking Status:  Never a Smoker


Smokeless Tobacco Frequency:  Never a User


Use of E-Cig and/or Vaping dev:  No


Substance use?:  No


Alcohol Use?:  No


Pt feels they are or have been:  Unable to obtain





Immunizations Up To Date


Date of Influenza Vaccine:  Oct 1, 2017


First/Initial COVID19 Vaccinat:  YES


Second COVID19 Vaccination Brayden:  YES


Tetanus Booster (TDap):  Unknown


Hepatitis A:  No


Hepatitis B:  No


PED Vaccines UTD:  No


Date of Pneumonia Vaccine:  Oct 1, 2017





Seasonal Allergies


Seasonal Allergies:  No





Current Status


Advance Directives:  No


Communicates:  Unable To Communicate


Primary Language:  English


Preferred Spoken Language:  English


Is interpretation needed?:  No


Sensory deficits:  Vision impairment, Hearing impairment





Past Medical History


Surgeries:  Abdominal


Currently Using CPAP:  No


Currently Using BIPAP:  No


Atrial Fibrillation, Hypertension


Dementia (Vascular), TIA


Sexually Transmitted Disease:  No


HIV/AIDS:  No


UTI-Chronic


Fractures, Gout


Hypothyroidsim


Cataract


Loss of Vision:  Denies


Hearing Impairment:  Hard of Hearing


Depression


Recent Skin Changes


Blood Disorders:  No


Adverse Reaction/Blood Tranf:  No





PMHx:


HTN


HLD





PSurgHx: none





Family Medical History


Reviewed Nursing Family Hx





Dementia


  G8 SISTER


Diabetes mellitus


  19 FATHER


Diabetes, Psychiatric Problems (dementia)





Review of Systems


ROS-Unable to Obtain:  Patient is nonverbal





Physical Exam


Vital Signs





Vital Signs - First Documented








 11/1/22 11/1/22





 02:44 04:52


 


Temp 36.7 


 


Pulse 103 


 


Resp 20 


 


B/P (MAP) 120/86 (97) 


 


Pulse Ox 77 


 


O2 Delivery Room Air 


 


O2 Flow Rate  5.00





Capillary Refill : Less Than 3 Seconds


Height, Weight, BMI


Height: 5'4.00"


Weight: 117lbs. 8.0oz. [id number]; 18.66 BMI


Method:Stated


General Appearance:  Chronically ill, Mild Distress, Thin


HEENT:  PERRL/EOMI, Pharynx Normal


Neck:  Non Tender, Supple


Respiratory:  Chest Non Tender, Normal Breath Sounds


Cardiovascular:  Regular Rate, Rhythm, No Edema


Gastrointestinal:  Non Tender, Soft


Rectal:  Deferred


Back:  Normal Inspection, No CVA Tenderness


Extremity:  Normal Capillary Refill, Non Tender


Neurologic/Psychiatric:  Alert, Disoriented


Skin:  Warm/Dry, Ecchymosis


Lymphatic:  No Adenopathy





Assessment/Plan


Assessment and Plan


Hypoxia


Leukocytosis


Possible GI bleed


HTN


Hx of afib


Vascular dementia


NICKY





Continue oxygen


Currently on vancomycin and Zosyn


Surgery consulted for possible GI bleed


Continue IV fluids





Admission Diagnosis


Admission Status:  Inpatient Order (span 2 midnights)


Reason for Inpatient Admission:  


Needs at least 48 hours of IV fluids and antibiotics





Clinical Quality Measures


DVT/VTE Risk/Contraindication:


Contraindications-Pharm:  Other *list below*


Other:  


gi bleed





RACHELLE RODRIGUEZ DO 11/2/22 0506:


History of Present Illness


History of Present Illness


Reason for visit/HPI


CC: Pneumonia with UTI and hematemesis


HPI: This is an 87 yr old female nursing home pt that Gabriela Ignacio and I have been 

taking care of. She was set to go home today when she presented to the ER with 

fever and altered mental status. She was found to have pneumonia with a white 

count of 20,000, UTI, and hematemesis. Dr. Lanier will be consulted. Pt was 

placed on Zosyn for aspiration pneumonia and Vancomycin for presumed bacteremia 

facility acquired. Spoke with DPOA and we will talk more about DNR since she 

appears to be very debilitated.





Allergies and Home Medications


Allergies


Coded Allergies:  


     melatonin (Verified  Allergy, Unknown, 5/22/22)


     quetiapine (Verified  Adverse Reaction, Unknown, 2/11/19)


   confusion





Patient Home Medication List


Acetaminophen (Acetaminophen) 500 Mg Tablet, 1,000 MG PO TID, (Reported)


   Entered as Reported by: MEE ROMAN on 11/1/22 0939


   Last Action: Reviewed


Acetaminophen (Acetaminophen) 325 Mg/10.15 Ml Soln, 20.3 ML PO Q6H PRN for PAIN-

MILD (1-4), (Reported)


   Entered as Reported by: MEE ROMAN on 11/1/22 0939


   Last Action: Reviewed


Alendronate Sodium (Alendronate Sodium) 70 Mg Tablet, 70 MG PO FRI, (Reported)


   Entered as Reported by: MEE ROMAN on 5/24/22 0851


   Last Action: Reviewed


Allopurinol (Allopurinol) 100 Mg Tablet, 100 MG PO DAILY, (Reported)


   Entered as Reported by: MEE ROMAN on 11/1/22 0939


   Last Action: Reviewed


Amlodipine Besylate (Amlodipine Besylate) 5 Mg Tablet, 5 MG PO DAILY, (Reported)


   Entered as Reported by: MEE ROMAN on 11/1/22 0939


   Last Action: Reviewed


Ascorbic Acid (Vitamin C) 1,000 Mg Tablet, 1,000 MG PO DAILY, (Reported)


   Entered as Reported by: MEE ROMAN on 11/1/22 0939


   Last Action: Reviewed


Aspirin (Aspirin) 81 Mg Tab.chew, 81 MG PO HS, (Reported)


   Entered as Reported by: MEE ROMAN on 11/1/22 0939


   Last Action: Reviewed


Carboxymethylcellulose Sodium (Refresh Tears) 0.5 % Drops, 1 DROP OU QID, 

(Reported)


   Entered as Reported by: MEE ROMAN on 11/1/22 0939


   Last Action: Reviewed


Cholecalciferol (Vitamin D3) (Vitamin D3) 125 Mcg (5000 Unit) Capsule, 125 MCG 

PO DAILY, (Reported)


   Entered as Reported by: MEE ROMAN on 5/24/22 0851


   Last Action: Reviewed


Diclofenac Sodium (Diclofenac Sodium) 1 % Gel..gram., 2 GM TOP QID PRN for PAIN-

BREAKTHROUGH, (Reported)


   Entered as Reported by: MEE ROMAN on 11/1/22 0939


   Last Action: Reviewed


Doxazosin Mesylate (Doxazosin Mesylate) 1 Mg Tablet, 1 MG PO DAILY, (Reported)


   Entered as Reported by: MEE ROMAN on 11/1/22 0939


   Last Action: Reviewed


Eyelid Cleanser Combination #5 (Ocusoft Lid Scrub) 1 Each Med..pad, 1 EACH TP 

DAILY, (Reported)


   Entered as Reported by: MEE ROMAN on 11/1/22 0939


   Last Action: Reviewed


Lactulose (Lactulose) 10 Gram/15 Ml Solution, 10 GM PO BID, (Reported)


   Entered as Reported by: MEE ROMAN on 11/1/22 0939


   Last Action: Reviewed


Multivitamin (Multivitamin) 1 Each Tablet, 1 EACH PO DAILY, (Reported)


   Entered as Reported by: MEE ROMAN on 5/24/22 0851


   Last Action: Reviewed


Polyethylene Glycol 3350 (Miralax) 17 Gram Powd.pack, 17 GM PO HS, (Reported)


   Entered as Reported by: MEE ROMAN on 5/24/22 0851


   Last Action: Reviewed


Polymyxin B Sulf/Trimethoprim (Polytrim Eye Drops) 10,000 Unit-1 Mg/Ml Drops, 1 

DROP OU TID, (Reported)


   Entered as Reported by: MEE ROMAN on 5/24/22 0851


   Last Action: Reviewed


Quetiapine Fumarate (Quetiapine Fumarate) 25 Mg Tablet, 12.5 MG PO HS, (Re

ported)


   Entered as Reported by: MEE ROMAN on 11/1/22 0939


   Last Action: Reviewed


Sennosides/Docusate Sodium (Senna Plus Tablet) 8.6 Mg-50 Mg Tablet, 1 EACH PO 

Q12H, (Reported)


   Entered as Reported by: MEE ROMAN on 5/24/22 0851


   Last Action: Reviewed


Sulfamethoxazole/Trimethoprim (Bactrim Ds Tablet) 1 Each Tablet, 1 EACH PO BID, 

(Reported)


   Entered as Reported by: MEE ROMAN on 11/1/22 0939


   Last Action: Reviewed


Discontinued Medications


Acetaminophen (Acetaminophen) 325 Mg/10.15 Ml Soln, 10.15 ML PO Q6H PRN for 

PAIN-MILD (1-4), (Reported)


   Discontinued Reason: Prescription changed


   Entered as Reported by: MEE ROMAN on 5/24/22 0851


Amlodipine Besylate (Amlodipine Besylate) 5 Mg Tablet, 5 MG PO DAILY


   Discontinued Reason: Duplicate Order


   Prescribed by: RACHELLE RODRIGUEZ on 5/27/22 1113


   Last Action: Discontinued


Aspirin (Aspirin) 81 Mg Tab.chew, 81 MG PO DAILY


   Discontinued Reason: Duplicate Order


   Prescribed by: RACHELLE RODRIGUEZ on 5/27/22 1113


   Last Action: Discontinued


Carboxymethylcellulose Sodium (Refresh Tears) 0.5 % Drops, 1 DROP OU DAILY, 

(Reported)


   Discontinued Reason: Duplicate Order


   Entered as Reported by: MEE ROMAN on 5/24/22 0851


   Last Action: Discontinued


Doxazosin Mesylate (Doxazosin Mesylate) 1 Mg Tablet, 1 MG PO DAILY


   Discontinued Reason: No Longer Taking


   Prescribed by: RACHELLE RODRIGUEZ on 5/27/22 1113


   Last Action: Discontinued


Oxycodone HCl (Oxycodone HCl) 5 Mg/5 Ml Solution, 5 ML GT Q4H PRN for PAIN-

SEVERE (8-10)


   Discontinued Reason: No Longer Taking


   Prescribed by: RACHELLE RODRIGUEZ on 5/27/22 1114


   Last Action: Discontinued





Past Medical-Social-Family Hx


Patient Social History


Marrital Status:  single


Employed/Student:  retired


Smoking Status:  Never a Smoker





Past Medical History


Dementia


Gastroesophageal Reflux





Family Medical History





Dementia


  G8 SISTER


Diabetes mellitus


  19 FATHER





Review of Systems


Constitutional:  see HPI





Physical Exam


General Appearance:  Chronically ill, Mild Distress, Thin, Other (Confused)


Respiratory:  No Accessory Muscle Use, No Respiratory Distress, Decreased Breath

Sounds


Cardiovascular:  Regular Rate, Rhythm


Neurologic/Psychiatric:  Alert, Depressed Affect, Disoriented





Assessment/Plan


Assessment and Plan


Assessment:


Hypoxia


Pneumonia


UTI facility acquired


GI bleed


Vascular dementia


PEG tube





Plan:


IV antibiotics


Needs DNR


Surgery consultation patient is





Admission Diagnosis


Admission Status:  Inpatient Order (span 2 midnights)


Reason for Inpatient Admission:  


Pneumonia UTI with GI bleed





Supervisory-Addendum Brief


Verification & Attestation


Participated in pt care:  history, MDM, physical


Personally performed:  exam, history, MDM, supervision of care


Care discussed with:  Medical Student


Procedures:  n/a


Results interpretation:  Verified all documentation


Verification and Attestation of Medical Student E/M Service





A medical student performed and documented this service in my presence. I 

reviewed and verified all information documented by the medical student and made

modifications to such information, when appropriate. I personally performed the 

physical exam and medical decision making. 





 Rachelle Rodriguez, Nov 2, 2022,05:06











MARKEL GOODWIN                      Nov 1, 2022 10:45


RACHELLE RODRIGUEZ DO                 Nov 2, 2022 05:06

## 2022-11-01 NOTE — DIAGNOSTIC IMAGING REPORT
PROCEDURE: CT abdomen and pelvis without contrast.



TECHNIQUE: Multiple contiguous axial images were obtained through

the abdomen and pelvis without the use of intravenous contrast.

Auto Exposure Controls were utilized during the CT exam to meet

ALARA standards for radiation dose reduction. 



INDICATION:  87-year-old female, diffuse abdominal pain, vomiting

black liquid.



CORRELATION STUDY: CT abdomen and pelvis 02/11/2019



FINDINGS:  

LOWER THORAX: Mild tree-in-bud nodularity suggested at the lung

bases. Subpleural nodule right middle lobe approximately 4 mm,

previously 3 mm.



LIVER: Unremarkable at unenhanced assessment.

GALLBLADDER: Small gallstones, otherwise unremarkable. No overt

bile duct dilatation.

SPLEEN: Calcified granuloma, otherwise unremarkable.

PANCREAS: Mildly atrophic.

ADRENAL GLANDS: Unremarkable.

KIDNEYS: Normal configuration.  No calcification or obstruction.

ABDOMINAL AORTA: Moderate wall calcification, nonaneurysmal.



GASTROINTESTINAL TRACT: Gastric feeding tube has been placed

since prior. No small bowel obstruction. Moderate stool in the

proximal colon. Colonic diverticulosis. No abdominal ascites

and/or free air.



URINARY BLADDER: Unremarkable.

REPRODUCTIVE: Uterus and adnexa unremarkable.



OSSEOUS STRUCTURES: Mild anterior wedging T12. Old pubic rami

fractures. Chronic bilateral rib fractures. Chronic left

transverse process fractures at L1-L2.



OTHER:  None.





IMPRESSION:

1. Very mild tree-in-bud nodularity lung bases suspect for

atypical type infection. Very small area of right middle lobe

pulmonary nodule.

2. Interval placement of a gastric feeding tube. No

gastrointestinal tract obstruction.



Initial report was provided by [employer].



Dictated by: 



  Dictated on workstation # [id number]

## 2022-11-02 NOTE — PROGRESS NOTE - SURGERY
YOLANDA PETERSON 11/2/22 0618:


Subjective


Date Seen by a Provider:  Nov 2, 2022


Time Seen by a Provider:  08:20


Subjective/Events-last exam


Patient appears in less distress compared to yesterday


She is more responsive today, making better eye contact and able to follow a few

commands


Her oxygen is down to 2 L from 4 L.


No blood was seen in PEG tube yesterday


No evidence of blood loss


Labs reviewed





Objective


Exam





Vital Signs








  Date Time  Temp Pulse Resp B/P (MAP) Pulse Ox O2 Delivery O2 Flow Rate FiO2


 


11/2/22 03:19 36.6 86 18 155/71 (99) 99 Nasal Cannula 2.00 





       2.00 


 


11/2/22 01:00  73      


 


11/1/22 23:13 36.1 75 18 140/63 (88) 97 Nasal Cannula 2.00 





       2.00 


 


11/1/22 20:53      Nasal Cannula 2.00 


 


11/1/22 19:35 36.6 82 18 161/73 (102) 96 Nasal Cannula 3.00 


 


11/1/22 19:01  82      


 


11/1/22 16:00    160/80 (106)    


 


11/1/22 16:00 36.7 93 18  95 Nasal Cannula 2.50 


 


11/1/22 12:37  90      


 


11/1/22 11:38 36.7 95 20 151/74 (99) 95 Nasal Cannula 3.00 


 


11/1/22 08:23 36.6 97 20 173/93 (119) 95 OxyMask 4.00 


 


11/1/22 08:00      OxyMask 4.00 


 


11/1/22 07:08  96      


 


11/1/22 06:46 36.4 90   96   


 


11/1/22 06:43  90 20 126/81 (96) 96 OxyMask 4.00 


 


11/1/22 06:24      OxyMask 4.00 


 


11/1/22 06:23      OxyMask 6.00 














I & O 


 


 11/2/22





 07:00


 


Intake Total 1950 ml


 


Output Total 1450 ml


 


Balance 500 ml





Capillary Refill : Less Than 3 Seconds


General Appearance:  Chronically ill, Mild Distress, Thin, Other (Confused)


HEENT:  PERRL/EOMI, Pharynx Normal


Neck:  Non Tender, Supple


Respiratory:  No Accessory Muscle Use, No Respiratory Distress, Decreased Breath

Sounds


Cardiovascular:  Regular Rate, Rhythm, No JVD


Peripheral Pulses:  2+ Radial Pulses (R), 2+ Radial Pulses (L)


Gastrointestinal:  soft, other (gastrostomy tube LLQ)


Extremity:  Normal Capillary Refill, Non Tender


Neurologic/Psychiatric:  Alert, Depressed Affect, Disoriented


Skin:  Warm/Dry, Ecchymosis; No Jaundice


Lymphatic:  No Adenopathy





Results


Lab


Laboratory Tests


11/2/22 05:20: 


White Blood Count 11.5H, Red Blood Count 3.07L, Hemoglobin 8.9L, Hematocrit 29L,

Mean Corpuscular Volume 95, Mean Corpuscular Hemoglobin 29, Mean Corpuscular 

Hemoglobin Concent 31L, Red Cell Distribution Width 18.3H, Platelet Count 188, 

Mean Platelet Volume 9.9, Immature Granulocyte % (Auto) 0, Neutrophils (%) 

(Auto) 74, Lymphocytes (%) (Auto) 16, Monocytes (%) (Auto) 8, Eosinophils (%) 

(Auto) 1, Basophils (%) (Auto) 0, Neutrophils # (Auto) 8.6H, Lymphocytes # 

(Auto) 1.8, Monocytes # (Auto) 0.9, Eosinophils # (Auto) 0.1, Basophils # (Auto)

0.0, Immature Granulocyte # (Auto) 0.0, Sodium Level 142, Potassium Level 3.7, 

Carbon Dioxide Level 20L, Anion Gap 11, Blood Urea Nitrogen 42H, Creatinine 

1.12, Estimat Glomerular Filtration Rate 48, BUN/Creatinine Ratio 38, Glucose 

Level 83, Calcium Level 11.2H, Corrected Calcium 12.2H, Total Bilirubin 0.5, 

Aspartate Amino Transf (AST/SGOT) 27, Alanine Aminotransferase (ALT/SGPT) 96H, 

Alkaline Phosphatase 214H, Total Protein 5.8L, Albumin 2.8L





Microbiology


11/1/22 Urine Culture - Preliminary, Resulted


          Gram Positive Cocci In Chains





Assessment/Plan


GI bleed-likely upper GI


   Monitor and trend hemoglobin


   Transfuse if needed


   Continue protonix


   NPO at this time


   No blood seen in gastrostomy yesterday


   No surgical intervention at this time





Hypoxia


   Currently on 2 Liters NC, down from 4 Liters yesterday





Atypical lung infection/Leukocytosis


   Continue on Vancomycin and Zosyn


   CT showed Tree in Bud nodularity at lung bases





NICKY


   Continue hydration





Elevated liver enzymes


   Monitor labs





Elevated Procalcitonin-Secondary to atypical lung infection


   Monitor labs 





PEG tube aspiration


Elevated Liver enzymes


Vascular Dementia-non verbal





Clinical Quality Measures


DVT/VTE Risk/Contraindication:


Contraindications-Pharm:  Other *list below*


Other:  


gi bleed





KAR LANIER DO 11/2/22 2047:


Subjective


Subjective/Events-last exam


Patient states shes doing okay.  She is not having any abdominal pain. Denies 

any other complaints.  


Hgb decreased today.   No evidence of active bleeding.  Denies n/v fever sweats 

chills shortness of breath or chest pain at this time.





Objective


Exam


General Appearance:  Chronically ill; No Mild Distress; Thin


HEENT:  PERRL/EOMI


Neck:  Non Tender, Supple


Respiratory:  Chest Non Tender, No Accessory Muscle Use, No Respiratory Distress


Cardiovascular:  Regular Rate, Rhythm, No JVD


Gastrointestinal:  soft, other (gastrostomy tube LLQ)


Extremity:  Non Tender


Neurologic/Psychiatric:  Alert; No Oriented x3


Skin:  Normal Color, Warm/Dry


Lymphatic:  No Adenopathy





Assessment/Plan





GI bleed likely upper 


Hypoxia


Atypical lung infection b/l


Dementia-vascular





Patient no active signs of bleeding 


WBC improving


Hgb decreased could be dilution


Protonix


Elevated procalcitonin and lung base changes likely  atypical infection- 

continue abx,  consider Ct of chest if not improving


Elevated liver enzymes and leukocytosis, repeat labs and follow-improving


No surgical intervention at this time. .





Supervisory-Addendum Brief


Verification & Attestation


Participated in pt care:  history, MDM, physical


Personally performed:  exam, history, MDM, supervision of care


Care discussed with:  Medical Student


Procedures:  n/a


Results interpretation:  Verified all documentation


Verification and Attestation of Medical Student E/M Service





A medical student performed and documented this service in my presence. I 

reviewed and verified all information documented by the medical student and made

modifications to such information, when appropriate. I personally performed the 

physical exam and medical decision making. 





 Kar Lanier, Nov 2, 2022,20:48











YOLANDA PETERSON                 Nov 2, 2022 06:18


KAR LANIER DO               Nov 2, 2022 20:47

## 2022-11-02 NOTE — SPEECH THERAPY DAILY NOTE
Speech Daily Progress Note


Subjective


Date Seen by Provider:  Nov 2, 2022


Time Seen by Provider:  11:45


The patient was lying in bed, eyes opened, upon entrance to her room by the 

clinician. The patient's son is present at bedside and remains for the skilled 

treatment session on this date. The patient intermittently made eye contact with

the clinician when her name was verbally spoken. The patient was positioned 

upright in bed for safe swallowing.





The patient is receiving supplemental oxygen via nasal cannula (2L) with SpO2% 

at 99% prior to, throughout, and following P.O. bolus trials.





Per RN (and student RN), the patient recently received pain medication. Prior to

the pain medication, the patient was reported as "restless." Following the pain 

medication, the patient has displayed more fatigue as the RN feels the patient 

has been able to find increased comfort.





Maximum verbal prompting is required throughout the treatment session for 

limited active participation. The patient returns to sleep on three occasions 

throughout the fifteen minute treatment session. The patient rarely follows 

simple, one-step commands and does not attempt verbalizations or responses to 

the clinician's questions.





Objective


The patient initially displays an appropriate ability to remove the ice chip 

from the teaspoon with lip rounding. The patient minimally manipulates the ice 

chip in the oral cavity, allowing the ice chip to melt. The patient does not 

display active posterior transfer of the material and allows for complete 

premature spillage into the hypopharynx as the thin liquid is not visualized in 

the oral cavity. The patient does not elicit a pharyngeal swallow. Regardless of

maximum verbal prompting and gentle tactile thyroid notch palpation, the patient

does not elicit a pharyngeal swallow. Following multiple minutes, the patient 

displays a pharyngeal swallow followed by an immediate, rigorous cough. 





Half teaspoons of thin liquid were attempted. Initially, the patient would not 

remove the liquid from the teaspoon. With maximum verbal prompting and thermal 

tactile stimulation to the lips, the patient displayed lip rounding and removal 

of the thin liquid. Oral holding behaviors were again displayed. With prolonged 

time, the patient displayed a pharyngeal swallow. Following the first attempt of

the half teaspoon, the patient continuously returns to sleep. When the patient 

wakes following maximum verbal prompting, she does not accept oral boluses. The 

patient is actively snoring at the completion of the treatment session. At this 

time, the patient is not deemed safe or appropriate for P.O. intake.








Recommendations:


- The patient should remain N.P.O.


- Pending clearance from the physician, the patient should receive total 

nutrition, hydration and medication via PEG tube.


   - The RN asked the clinician if the PEG tube could be utilized. The clinician

deferred all decisions regarding PEG tube use to the physician, as this 

clinician recommended following the evaluation the day prior. Additionally, the 

dietician has placed PEG tube feeding recommendations for nutrition in the ry bailey's chart, however, has also deferred the recommendation for PEG tube use to

the patient's medical team.


- Frequent and excellent oral care to reduce the transfer of oral bacteria to 

the lungs should aspiration of secretions occur.


- Speech pathology to re-assess the oropharyngeal swallowing function, as 

appropriate.





Due to the patient's history of fluctuating aspiration, the patient's current 

level of lethargy, and her inability to consistency follow verbal instructions, 

the clinician suspects a high risk of aspiration with P.O. intake. Prior to 

recommendations of an oral diet, speech pathology would recommend a modified 

barium swallow evaluation when the patient is appropriate and able to 

participate. 





Additionally, the patient's son reports, "She does that with the liquids. With 

solids she is okay but with the liquids she coughs and coughs." The clinician 

requested clarification from the son, who appears to communicate to the 

clinician the patient consistently displayed s/s of suspected aspiration 

throughout meals with thin liquids prior to admission. The information provided 

by the son in correlation to the patient's most recent chest exam, provides 

additional necessity of a modified barium swallow prior to re-initiation of an 

oral diet if an oral diet recommendation becomes appropriate.





The above recommendations were provided to the RN at completion of the study.





Assessment


Assessment Current Status:  Poor Progress





Treatment Plan


Continue Plan of Care





Speech Short Term Goals


Short Term Goals


Short Term Goals


1. The patient will tolerate trials of the least restrictive consistency without

s/s of suspected aspiration.


Time Frame-STG:  Two Days.





Speech Long Term Goals


Long Term Goals


1. The patient will demonstrate tolerance of the least restrictive diet 

consistency without s/s of suspected aspiration.


Time Frame:  Six Weeks.





Speech-Plan


Treatment Plan


Speech Therapy Treatment Plan:  Continue Plan of Care


Treatment Duration:  Nov 4, 2022


Frequency:  2 times per week


Estimated Hrs Per Day:  .25 hour per day


Rehab Potential:  Poor





Safety Risks/Education


Teaching Recipient:  Patient, Family


Teaching Methods:  Discussion


Response to Teaching:  Reinforcement Needed


Education Topics Provided:  


Results, Recommendations





Time


Speech Therapy Time In:  11:45


Speech Therapy Time Out:  12:00


Total Billed Time:  15


Billed Treatment Time


1, JESSE CAPUTO                Nov 2, 2022 13:22

## 2022-11-02 NOTE — PROGRESS NOTE
MARKEL GOODWIN 11/2/22 1221:


Subjective


Date Seen by a Provider:  Nov 2, 2022


Time Seen by a Provider:  10:30


Subjective/Events-last exam


Patient is slightly more responsive today. She is still unable to answer most 

questions but replied, "not good" when asked how she was doing today. The 

patient's DPOA was in the room and stated that the patient has not said much 

else besides that. The patient was slightly restless this morning, but did not 

appear in any distress. The patient's WBC count improved from 20.9 yesterday to 

11.5 today.





Objective


Exam


Last Set of Vital Signs





Vital Signs








  Date Time  Temp Pulse Resp B/P (MAP) Pulse Ox O2 Delivery O2 Flow Rate FiO2


 


11/2/22 12:11 36.8 80 21 172/78 (109) 96 Nasal Cannula 2.00 





Capillary Refill : Less Than 3 Seconds


I&O











Intake and Output 


 


 11/1/22





 23:58


 


Intake Total 1950 ml


 


Output Total 900 ml


 


Balance 1050 ml


 


 


 


Intake Oral 0 ml


 


IV Total 1950 ml


 


Output Urine Total 900 ml


 


Daily Weight Change Unsure








General:  Alert, No Acute Distress


HEENT:  Atraumatic


Neck:  Supple


Lungs:  Clear to Auscultation


Heart:  Regular Rate, No Murmurs


Abdomen:  Soft, No Tenderness


Extremities:  No Clubbing





Results


Lab


Laboratory Tests


11/2/22 05:20: 


White Blood Count 11.5H, Red Blood Count 3.07L, Hemoglobin 8.9L, Hematocrit 29L,

Mean Corpuscular Volume 95, Mean Corpuscular Hemoglobin 29, Mean Corpuscular 

Hemoglobin Concent 31L, Red Cell Distribution Width 18.3H, Platelet Count 188, 

Mean Platelet Volume 9.9, Immature Granulocyte % (Auto) 0, Neutrophils (%) 

(Auto) 74, Lymphocytes (%) (Auto) 16, Monocytes (%) (Auto) 8, Eosinophils (%) 

(Auto) 1, Basophils (%) (Auto) 0, Neutrophils # (Auto) 8.6H, Lymphocytes # (Auto

) 1.8, Monocytes # (Auto) 0.9, Eosinophils # (Auto) 0.1, Basophils # (Auto) 0.0,

Immature Granulocyte # (Auto) 0.0, Sodium Level 142, Potassium Level 3.7, 

Chloride Level 111H, Carbon Dioxide Level 20L, Anion Gap 11, Blood Urea Nitrogen

42H, Creatinine 1.12, Estimat Glomerular Filtration Rate 48, BUN/Creatinine 

Ratio 38, Glucose Level 83, Calcium Level 11.2H, Corrected Calcium 12.2H, Total 

Bilirubin 0.5, Aspartate Amino Transf (AST/SGOT) 27, Alanine Aminotransferase 

(ALT/SGPT) 96H, Alkaline Phosphatase 214H, Total Protein 5.8L, Albumin 2.8L, 

Vancomycin Level Trough 8.6L





Microbiology


11/1/22 Urine Culture - Preliminary, Resulted


          Enterococcus species





Assessment/Plan


Assessment/Plan


Assess & Plan/Chief Complaint


Pneumonia


Hypoxia


UTI- facility acquired


Vascular dementia


GI bleed





Currently on vancomycin and zosyn


Continue oxygen


Surgery consulted


Continue protonix


Maintain NPO





Clinical Quality Measures


Admission Status


Admission Dx


Hypoxia


Leukocytosis


Possible GI bleed


HTN


Hx of afib


Vascular dementia


NICKY





Continue oxygen


Currently on vancomycin and Zosyn


Surgery consulted for possible GI bleed


Continue IV fluids





DVT/VTE Risk/Contraindication:


Contraindications-Pharm:  Other *list below*


Other:  


gi bleed





RACHELLE EHWITT DO 11/2/22 2041:


Subjective


Subjective/Events-last exam


Pt is very debilitated


Remains nonverbal


Hemoglobin is down


Urine culture shows Enterococcus


Pt is on Zosyn and Vancomycin


DPOA is at the bedside and insists she is a full code


She appears to be a hospice candidate currently


Will continue to be supportive





Objective


Exam


General:  Alert, Other (confused)


Lungs:  Clear to Auscultation


Heart:  Regular Rate





Assessment/Plan


Assessment/Plan


Assess & Plan/Chief Complaint


Appreciate general surgery


Patient has a very poor prognosis





Supervisory-Addendum Brief


Verification & Attestation


Participated in pt care:  history, MDM, physical


Personally performed:  exam, history, MDM, supervision of care


Care discussed with:  Medical Student


Procedures:  n/a


Results interpretation:  Verified all documentation


Verification and Attestation of Medical Student E/M Service





A medical student performed and documented this service in my presence. I 

reviewed and verified all information documented by the medical student and made

modifications to such information, when appropriate. I personally performed the 

physical exam and medical decision making. 





 Rachelle Hewitt, Nov 2, 2022,20:42











MARKEL GOODWIN                      Nov 2, 2022 12:21


RACHELLE HEWITT DO                 Nov 2, 2022 20:41

## 2022-11-03 NOTE — PROGRESS NOTE
MARKEL GOODWIN 11/3/22 1321:


Subjective


Date Seen by a Provider:  Nov 3, 2022


Time Seen by a Provider:  11:00


Subjective/Events-last exam


Patient was unable to be aroused this morning, no family at bedside. Patient's 

hemoglobin is down to 8.1 today from 8.9 yesterday. Physical exam unremarkable. 

Patient's condition largely unchanged from yesterday





Objective


Exam


Last Set of Vital Signs





Vital Signs








  Date Time  Temp Pulse Resp B/P (MAP) Pulse Ox O2 Delivery O2 Flow Rate FiO2


 


11/3/22 12:09 36.3 70 21 137/65 (89) 97 Nasal Cannula 2.50 





Capillary Refill : Less Than 3 Seconds


I&O











Intake and Output 


 


 11/3/22





 00:00


 


Intake Total 1000 ml


 


Output Total 2050 ml


 


Balance -1050 ml


 


 


 


Intake Oral 0 ml


 


IV Total 1000 ml


 


Output Urine Total 2050 ml


 


# Bowel Movements 2








General:  Other (Unable to be aroused)


HEENT:  Atraumatic


Neck:  Supple


Lungs:  Clear to Auscultation


Heart:  Regular Rate, No Murmurs


Extremities:  No Clubbing, No Edema


Skin:  No Significant Lesion





Results


Lab


Laboratory Tests


11/2/22 20:09: Glucometer 65L


11/3/22 05:05: 


White Blood Count 10.7, Red Blood Count 2.77L, Hemoglobin 8.1L, Hematocrit 26L, 

Mean Corpuscular Volume 93, Mean Corpuscular Hemoglobin 29, Mean Corpuscular Hem

oglobin Concent 31L, Red Cell Distribution Width 17.6H, Platelet Count 195, Mean

Platelet Volume 9.8, Immature Granulocyte % (Auto) 0, Neutrophils (%) (Auto) 72,

Lymphocytes (%) (Auto) 17, Monocytes (%) (Auto) 9, Eosinophils (%) (Auto) 2, 

Basophils (%) (Auto) 0, Neutrophils # (Auto) 7.7, Lymphocytes # (Auto) 1.9, 

Monocytes # (Auto) 0.9, Eosinophils # (Auto) 0.2, Basophils # (Auto) 0.0, 

Immature Granulocyte # (Auto) 0.0, Sodium Level 142, Potassium Level 3.0L, 

Chloride Level 109H, Carbon Dioxide Level 22, Anion Gap 11, Blood Urea Nitrogen 

25H, Creatinine 0.84, Estimat Glomerular Filtration Rate 67, BUN/Creatinine 

Ratio 30, Glucose Level 113H, Calcium Level 9.9, Corrected Calcium 11.0H, Total 

Bilirubin 0.6, Aspartate Amino Transf (AST/SGOT) 19, Alanine Aminotransferase 

(ALT/SGPT) 65H, Alkaline Phosphatase 170H, Total Protein 5.3L, Albumin 2.6L, 

Vancomycin Level Trough 12.1





Microbiology


11/1/22 Urine Culture - Preliminary, Resulted


          Enterococcus faecalis





Assessment/Plan


Assessment/Plan


Assess & Plan/Chief Complaint


Pneumonia


Hypoxia


UTI- facility acquired


Vascular dementia


GI bleed





Currently on vancomycin and zosyn


Continue oxygen


Surgery consulted


Continue protonix


Starting on clears through PEG tube





Clinical Quality Measures


Admission Status


Admission Dx


Hypoxia


Leukocytosis


Possible GI bleed


HTN


Hx of afib


Vascular dementia


NICKY





Continue oxygen


Currently on vancomycin and Zosyn


Surgery consulted for possible GI bleed


Continue IV fluids





DVT/VTE Risk/Contraindication:


Contraindications-Pharm:  Other *list below*


Other:  


gi bleed





RACHELLE HEWITT DO 11/4/22 0509:


Subjective


Subjective/Events-last exam


Patient appears to be more declined


Labs improved


Will start clear liquids per tube


Extremely poor prognosis


Family in denial





Objective


Exam


General:  Other (Unable to be aroused)


Lungs:  Clear to Auscultation


Heart:  Regular Rate





Assessment/Plan


Assessment/Plan


Assess & Plan/Chief Complaint


Start clear liquids per PEG tube





Supervisory-Addendum Brief


Verification & Attestation


Participated in pt care:  history, MDM, physical


Personally performed:  exam, history, MDM, supervision of care


Care discussed with:  Medical Student


Procedures:  n/a


Results interpretation:  Verified all documentation


Verification and Attestation of Medical Student E/M Service





A medical student performed and documented this service in my presence. I 

reviewed and verified all information documented by the medical student and made

modifications to such information, when appropriate. I personally performed the 

physical exam and medical decision making. 





 Rachelle Hewitt Nov 4, 2022,05:08











MARKEL GOODWIN                      Nov 3, 2022 13:21


RACHELLE HEWITT DO                 Nov 4, 2022 05:09

## 2022-11-03 NOTE — SPEECH THERAPY DAILY NOTE
Speech Daily Progress Note


Subjective


Date Seen by Provider:  Nov 3, 2022


Time Seen by Provider:  08:14


The patient was lying in bed, eyes opened, upon entrance to her room by the 

clinician. The patient made eye contact with the clinician when her name was 

verbally spoken. The patient was positioned upright in bed for safe swallowing.





The patient remains on 2L supplemental oxygen via nasal cannula with SpO2% at 

99% prior to, throughout, and following P.O. trials.





Objective


The patient displays 60% accuracy with simple yes and no questions provided by 

the clinician on this date. The patient's vocal quality is aphonic. The 

clinician provided the rationale for the skilled therapy session to which the 

patient responded, "I can't." The clinician provided gentle encouragement for 

participation.





The patient was provided one ice chip, three half teaspoons of water (thin 

liquid), and multiple attempts of a coated teaspoon with applesauce (the patient

refused). The patient was able to remove the ice chip from the teaspoon with 

appropriate lip rounding. The patient did not manipulate the ice chip in the 

oral cavity, allowing for suspected complete posterior bolus loss. Following 75 

seconds, the patient elicited a pharyngeal swallow followed by an immediate, 

rigorous cough. The patient required maximum verbal cueing and direct modeling 

from the clinician to remove the half teaspoon of water from the spoon as the 

clinician would not "dump" the contents into the patient's oral cavity for 

improved patient safety. Following multiple attempts, the patient removed three 

half teaspoons of water. The patient displayed an immediate, rigorous cough 

following two of the three teaspoons. The clinician attempted a coated teaspoon 

of puree. The teaspoon was brought to the patient's lips, however, the patient 

said, "no." The clinician provided gentle tactile stimulate with the teaspoon, 

however, the patient continued refusal, not opening the oral cavity for the 

bolus. At this time, the treatment session was ended.





Recommendations:


- The patient should remain N.P.O.


- Pending clearance from the physician, the patient should receive total 

nutrition, hydration and medication via PEG tube.


   - The dietician has placed PEG tube feeding recommendations for nutrition in 

the patient's chart, however, has also deferred the recommendation for PEG tube 

use to the patient's medical team.


- Frequent and excellent oral care to reduce the transfer of oral bacteria to t

he lungs should aspiration of secretions occur.


- Speech pathology to re-assess the oropharyngeal swallowing function, as ethan goldsmith.





At this time, the speech pathologist suspects a poor prognosis for the 

oropharyngeal swallowing function. 





Due to the patient's history of fluctuating aspiration, the patient's son's 

reports of suspected s/s of suspected aspiration with thin liquids, the 

patient's current level of lethargy, and her inability to consistency follow 

verbal instructions, the clinician suspects a high risk of aspiration with P.O. 

intake. Prior to recommendations of an oral diet, speech pathology would 

recommend a modified barium swallow evaluation when/if the patient is 

appropriate and able to participate. 





The above recommendations were provided to the RN at completion of the study.





Assessment


Assessment Current Status:  Poor Progress





Treatment Plan


Continue Plan of Care





Speech Short Term Goals


Short Term Goals


Short Term Goals


1. The patient will tolerate trials of the least restrictive consistency without

s/s of suspected aspiration.


Time Frame-STG:  Two Days.





Speech Long Term Goals


Long Term Goals


1. The patient will demonstrate tolerance of the least restrictive diet 

consistency without s/s of suspected aspiration.


Time Frame:  Six Weeks.





Speech-Plan


Treatment Plan


Speech Therapy Treatment Plan:  Continue Plan of Care


Treatment Duration:  Nov 4, 2022


Frequency:  2 times per week


Estimated Hrs Per Day:  .25 hour per day


Rehab Potential:  Poor





Safety Risks/Education


Teaching Recipient:  Patient


Teaching Methods:  Discussion


Response to Teaching:  Unable to Comprehend


Education Topics Provided:  


Results, Recommendations, Plan of Care





Time


Speech Therapy Time In:  08:14


Speech Therapy Time Out:  08:30


DATE:  Nov 3, 2022


Total Billed Time:  16


Billed Treatment Time


1, JESSE CAPUTO                Nov 3, 2022 08:38

## 2022-11-03 NOTE — PROGRESS NOTE - SURGERY
YOLANDA PETERSON 11/3/22 0606:


Subjective


Date Seen by a Provider:  Nov 3, 2022


Time Seen by a Provider:  11:00


Subjective/Events-last exam


Patient is improved today, more alert and able to shake head to yes or no 

questioning. 


Physical exam elicited no tenderness to palpation. 


Was lying comfortable in bed. Much less restlessness compared to yesterday


Is in less distress, dyspnea improved  


No signs of blood loss


Labs reviewed





Objective


Exam





Vital Signs








  Date Time  Temp Pulse Resp B/P (MAP) Pulse Ox O2 Delivery O2 Flow Rate FiO2


 


11/3/22 04:09 36.5 80 20 158/75 (102) 95 Nasal Cannula 2.00 


 


11/3/22 01:00  74      


 


11/3/22 00:41 36.7 71 20 162/77 (105) 98 Nasal Cannula 2.00 


 


11/2/22 20:58      Nasal Cannula 2.00 


 


11/2/22 20:10 36.1 84 16 163/82 (109) 100 Nasal Cannula 3.00 


 


11/2/22 19:00  82      


 


11/2/22 15:42 36.4 80 18 195/87 (123) 100 Nasal Cannula 3.00 


 


11/2/22 13:27  82      


 


11/2/22 12:11 36.8 80 21 172/78 (109) 96 Nasal Cannula 2.00 


 


11/2/22 08:00      Nasal Cannula 2.00 


 


11/2/22 07:12 36.4 91 20 181/77 (111) 94 Nasal Cannula 2.00 


 


11/2/22 06:31     99 Nasal Cannula 2.00 














I & O 


 


 11/3/22





 07:00


 


Intake Total 1000 ml


 


Output Total 1300 ml


 


Balance -300 ml





Capillary Refill : Less Than 3 Seconds


General Appearance:  Chronically ill; No Mild Distress; Thin


HEENT:  PERRL/EOMI; No Scleral Icterus (L), No Scleral Icterus (R)


Neck:  Non Tender, Supple


Respiratory:  Chest Non Tender, No Accessory Muscle Use, No Respiratory Distress


Cardiovascular:  Regular Rate, Rhythm, No JVD


Peripheral Pulses:  1+ Radial Pulses (R), 1+ Radial Pulses (L)


Gastrointestinal:  non tender, soft, other (gastrostomy tube LLQ)


Extremity:  Non Tender, No Pedal Edema


Neurologic/Psychiatric:  Alert; No Oriented x3


Skin:  Normal Color, Warm/Dry, Other (Distal nail bed ischemic )


Lymphatic:  No Adenopathy





Results


Lab


Laboratory Tests


11/2/22 20:09: Glucometer 65L


11/3/22 05:05: 


White Blood Count 10.7, Red Blood Count 2.77L, Hemoglobin 8.1L, Hematocrit 26L, 

Mean Corpuscular Volume 93, Mean Corpuscular Hemoglobin 29, Mean Corpuscular 

Hemoglobin Concent 31L, Red Cell Distribution Width 17.6H, Platelet Count 195, 

Mean Platelet Volume 9.8, Immature Granulocyte % (Auto) 0, Neutrophils (%) 

(Auto) 72, Lymphocytes (%) (Auto) 17, Monocytes (%) (Auto) 9, Eosinophils (%) 

(Auto) 2, Basophils (%) (Auto) 0, Neutrophils # (Auto) 7.7, Lymphocytes # (Auto)

1.9, Monocytes # (Auto) 0.9, Eosinophils # (Auto) 0.2, Basophils # (Auto) 0.0, 

Immature Granulocyte # (Auto) 0.0, Vancomycin Level Trough 12.1





Microbiology


11/1/22 Urine Culture - Preliminary, Resulted


          Enterococcus faecalis





Assessment/Plan


GI bleed-likely upper GI-


   Monitor and trend hemoglobin- Down to 8.1 from 8.9 yesterday, most likely 

dilution


   Transfuse if needed


   Continue protonix


   NPO at this time


   No blood seen in gastrostomy yesterday


   No surgical intervention at this time





Hypoxia


   Currently on 2 Liters NC





Atypical lung infection/Leukocytosis-Improving WBC at 10.7 from 11.5 yesterday


   Continue on Vancomycin and Zosyn


   CT showed [place] in [place] nodularity at lung bases





NICKY


   Continue hydration





UTI


   Urine culture grew Enterococcus Faecalis 


   Continue antibiotics 





Elevated liver enzymes


   Monitor labs





Elevated Procalcitonin-Secondary to atypical lung infection


   Monitor labs 





PEG tube aspiration


Elevated Liver enzymes


Vascular Dementia-non verbal





Clinical Quality Measures


DVT/VTE Risk/Contraindication:


Contraindications-Pharm:  Other *list below*


Other:  


gi bleed





KAR LANIER DO 11/4/22 0025:


Subjective


Subjective/Events-last exam


Patient answering questions.  Not having any pain.  Answers questions but 

confused. Hgb minimal drop.


No family at bedside.





Objective


Exam


General Appearance:  No Apparent Distress, Chronically ill, Thin


HEENT:  PERRL/EOMI, Normal ENT Inspection


Neck:  Non Tender, Supple


Respiratory:  Chest Non Tender, No Accessory Muscle Use, No Respiratory Distress


Cardiovascular:  Regular Rate, Rhythm, No JVD


Gastrointestinal:  non tender, soft, other (gastrostomy tube LUQ)


Extremity:  Non Tender, No Pedal Edema


Neurologic/Psychiatric:  Alert; No Oriented x3


Skin:  Normal Color, Warm/Dry


Lymphatic:  No Adenopathy





Assessment/Plan


upper Gi bleed, hgb still in 8 range


vascular dementia


atyplical lung infection


UTI





Continue medical management


no surgical intervention at this time


Continue abx





Supervisory-Addendum Brief


Verification & Attestation


Participated in pt care:  history, MDM, physical


Personally performed:  exam, history, MDM, supervision of care


Care discussed with:  Medical Student


Procedures:  n/a


Results interpretation:  Verified all documentation


Verification and Attestation of Medical Student E/M Service





A medical student performed and documented this service in my presence. I 

reviewed and verified all information documented by the medical student and made

modifications to such information, when appropriate. I personally performed the 

physical exam and medical decision making. 





 Kar Lanier, Nov 3, 2022,23:24











YOLANDA PETERSON                 Nov 3, 2022 06:06


KAR LANIER DO               Nov 4, 2022 00:25

## 2022-11-04 NOTE — PROGRESS NOTE - SURGERY
YOLANDA PETERSON 11/4/22 1037:


Subjective


Date Seen by a Provider:  Nov 4, 2022


Time Seen by a Provider:  10:28


Subjective/Events-last exam


Patient is improved today, more alert and able to shake head to yes or no 

questioning. Was able to point to pain.


Physical exam elicited pain on left shoulder, otherwise no tenderness noted 


Was lying comfortable in bed. Much less restlessness compared to yesterday


Is in less distress, dyspnea improved  


Did have bowel movement yesterday- was reported to be dark.


Labs reviewed





Objective


Exam





Vital Signs








  Date Time  Temp Pulse Resp B/P (MAP) Pulse Ox O2 Delivery O2 Flow Rate FiO2


 


11/4/22 07:21 36.0 64 18 150/  Nasal Cannula 69.00 





       3.00 


 


11/4/22 03:11 36.7 59 16 162/76 (104) 98 Nasal Cannula 3.00 


 


11/4/22 00:30 36.4 56 17 140/61 (87) 98 Nasal Cannula 2.50 


 


11/3/22 21:50  66  133/64 (87)    


 


11/3/22 21:09     98 Nasal Cannula 2.50 


 


11/3/22 20:33      Nasal Cannula 2.00 


 


11/3/22 19:37 36.5 75 18 179/81 (113) 98 Nasal Cannula 4.00 


 


11/3/22 15:28 36.5 67 22 163/68 (99) 97 Nasal Cannula 2.50 


 


11/3/22 12:09 36.3 70 21 137/65 (89) 97 Nasal Cannula 2.50 


 


11/3/22 10:54     97 Nasal Cannula 2.50 














I & O 


 


 11/4/22





 07:00


 


Intake Total 0 ml


 


Output Total 1425 ml


 


Balance -1425 ml





Capillary Refill : Less Than 3 Seconds


General Appearance:  No Apparent Distress, Chronically ill, Thin


HEENT:  PERRL/EOMI, Normal ENT Inspection


Neck:  Non Tender, Supple


Respiratory:  Chest Non Tender, No Accessory Muscle Use, No Respiratory Distress


Cardiovascular:  Regular Rate, Rhythm, No JVD


Peripheral Pulses:  1+ Radial Pulses (R), 1+ Radial Pulses (L)


Gastrointestinal:  non tender, soft, other (gastrostomy tube LUQ)


Extremity:  Non Tender, No Pedal Edema, Other (Left shoulder tender to 

palpation)


Neurologic/Psychiatric:  Alert; No Oriented x3


Skin:  Normal Color, Warm/Dry


Lymphatic:  No Adenopathy





Results


Lab


Laboratory Tests


11/4/22 05:20: 


White Blood Count 9.4, Red Blood Count 2.63L, Hemoglobin 7.7L, Hematocrit 25L, 

Mean Corpuscular Volume 93, Mean Corpuscular Hemoglobin 29, Mean Corpuscular 

Hemoglobin Concent 31L, Red Cell Distribution Width 17.5H, Platelet Count 188, 

Mean Platelet Volume 10.3, Immature Granulocyte % (Auto) 0, Neutrophils (%) 

(Auto) 63, Lymphocytes (%) (Auto) 25, Monocytes (%) (Auto) 9, Eosinophils (%) 

(Auto) 3, Basophils (%) (Auto) 0, Neutrophils # (Auto) 5.9, Lymphocytes # (Auto)

2.3, Monocytes # (Auto) 0.9, Eosinophils # (Auto) 0.3, Basophils # (Auto) 0.0, 

Immature Granulocyte # (Auto) 0.0, Sodium Level 143, Potassium Level 3.2L, 

Chloride Level 114H, Carbon Dioxide Level 20L, Anion Gap 9, Blood Urea Nitrogen 

15, Creatinine 0.78, Estimat Glomerular Filtration Rate 73, BUN/Creatinine Ratio

19, Glucose Level 116H, Calcium Level 9.4, Corrected Calcium 10.6H, Total 

Bilirubin 0.6, Aspartate Amino Transf (AST/SGOT) 16, Alanine Aminotransferase 

(ALT/SGPT) 52, Alkaline Phosphatase 171H, Total Protein 5.2L, Albumin 2.5L





Microbiology


11/1/22 Urine Culture - Preliminary, Resulted


          Enterococcus faecalis





Assessment/Plan


GI bleed-likely upper GI-


   Monitor and trend hemoglobin- Down to 7.7 from 8.1 yesterday


   Transfuse if needed


   Continue protonix


   Clear liquid at this time


   Stool still noted to be dark


   No blood seen in gastrostomy yesterday


   No surgical intervention at this time





Hypoxia


   Currently on 2 Liters NC





Atypical lung infection/Leukocytosis-Improving WBC at 9.4 from 10.5 yesterday


   Continue on Vancomycin and Zosyn


   CT showed [place] nodularity at lung bases





NICKY-resolved


   Continue hydration





UTI


   Urine culture grew Enterococcus Faecalis 


   Continue antibiotics 





Elevated liver enzymes-Improving


   Monitor labs





Elevated Procalcitonin-Secondary to atypical lung infection


   Monitor labs





PEG tube aspiration


Vascular Dementia-non verbal





Clinical Quality Measures


DVT/VTE Risk/Contraindication:


Contraindications-Pharm:  Other *list below*


Other:  


gi bleed





KAR LANIER DO 11/4/22 1155:


Subjective


Subjective/Events-last exam


Patient alert and follows directions.  Patient not having any significant pain. 

N.p.o. using tube feeds.  Breathing okay.  Dark bm yesterday. No family present 

at this time.  Denies nausea vomiting fever sweats chills.





Objective


Exam


General Appearance:  No Apparent Distress, Chronically ill, Thin


HEENT:  PERRL/EOMI, Normal ENT Inspection


Neck:  Non Tender, Supple


Respiratory:  Chest Non Tender, No Accessory Muscle Use, No Respiratory Distress


Cardiovascular:  Regular Rate, Rhythm, No JVD


Gastrointestinal:  non tender, soft, other (gastrostomy tube LUQ)


Extremity:  Normal Inspection, Non Tender


Neurologic/Psychiatric:  Alert; No Oriented x3


Skin:  Normal Color, Warm/Dry


Lymphatic:  No Adenopathy





Assessment/Plan


GI bleed-likely upper GI-Hypoxia


Atypical lung infection/Leukocytosis-


NICKY-resolved


UTI


PEG tube aspiration


Vascular Dementia-non verbal





continue tube feeds


Protonix


Follow hgb and transfuse as needed.








Supervisory-Addendum Brief


Verification & Attestation


Participated in pt care:  history, MDM, physical


Personally performed:  exam, history, MDM, supervision of care


Care discussed with:  Medical Student


Procedures:  n/a


Results interpretation:  Verified all documentation


Verification and Attestation of Medical Student E/M Service





A medical student performed and documented this service in my presence. I 

reviewed and verified all information documented by the medical student and made

modifications to such information, when appropriate. I personally performed the 

physical exam and medical decision making. 





 Kar Lanier, Nov 4, 2022,11:55











YOLANDA PETERSON                 Nov 4, 2022 10:37


KAR LANIER DO               Nov 4, 2022 11:55

## 2022-11-04 NOTE — PHYSICIAN QUERY CLARIFICATION
Physician Query-General


Query to Physician:


The medical record reflects the following clinical evidence:


Clinical Indicators: [facility] diet with thin liquids PTA, Admission 

Chest Xray: "Very mild tree-in-bud nodularity lung bases suspect for atypical 

type infection" "Rigorous Coughing" with liquid boluses per Speech therapy, 

Dietary recommendation of NPO per Speech therapy due to "high risk" for 

aspiration


Risk Factor(s): Dementia, Hx CVA, Peg tube, and Dysphagia, 


Treatment: IV ABX, Speech therapy eval/Dysphagia, Made NPO, recommended feeding 

per Peg tube, Aspiration precautions, 





1. Pneumonitis due to inhalation of food and vomit, present on admission


2. Other explanation of clinical findings


3. Unable to determine (no explanation for clinical findings)





Please clarify and document your clinical opinion in the progress notes and 

discharge summary including the definitive and/or presumptive diagnosis, 

(suspected or probable), related to the above clinical findings. Please include 

clinical findings supporting your diagnosis.


Sugar Cardoso RN, MSN


[phone]


[email or web address]





PHYSICIAN RESPONSE:


Based on the clinical findings in the record, please respond to the query above 

on this document as an addendum. 








Physician Response:


Physician Response


1














If you have questions please contact:


                   


:


Ext:





Thank you for your time and cooperation.


Clinical /








*********************This is a permanent part of the medical 

record*******************











SUGAR CARDOSO                    Nov 4, 2022 17:35


JASON RODRIGUEZ DO                 Nov 4, 2022 20:23

## 2022-11-04 NOTE — SPEECH THERAPY DAILY NOTE
Speech Daily Progress Note


Subjective


Date Seen by Provider:  Nov 4, 2022


Time Seen by Provider:  08:10


The patient was lying in bed, eyes opened, upon entrance to her room by the 

clinician. The patient made immediate eye contact when her name was spoken in a 

verbal greeting from the clinician. The patient does attempt verbalization, 

however, her speech is not intelligible to the clinician.





The patient was positioned upright for safe swallowing. The patient is currently

receiving 3L supplemental oxygen via nasal cannula with SpO2% at 99% (prior to, 

during, and following P.O. trials).





Objective


The patient's voice quality is harsh and breathy. The oral cavity is clean, yet 

dry. The patient displays increased ability to follow verbal instructions on 

this date. Additionally, the patient displays slightly improved alertness and 

participation throughout the treatment session.





The patient is provided four ice chips, eight half teaspoon of water, and five 

half teaspoons of puree. The patient orally accepts each bolus on this date, by 

rounding her lips to remove the bolus from the teaspoon. The patient displays 

increased oral and lingual manipulation of the ice chip, actively demonstrating 

mastication. Improved timing of the pharyngeal swallow trigger is achieved. The 

patient displays an immediate wet vocal quality and subtle throat clearing 

behaviors following the swallows of thin liquid and ice chips. Following the 

final half teaspoon, an immediate rigorous cough was displayed with continuous 

throat clearing. The clinician attempted a thickened consistency of applesauce. 

Initially, the patient refused, however, accepted with gentle encouragement from

the clinician. The patient continues with subtle throat clearing and 

increasingly wet vocal quality. Following the final teaspoon, a second rigorous 

cough was displayed. The coughing response continued with the subsequent bolus 

provided (thin liquid half teaspoon). Once the patient returned to baseline 

(coughing ceased), the clinician ended the treatment session. 





The patient's most recent video swallow evaluation report was reviewed by the 

clinician. The report does note moderate pharyngeal residue following the 

swallow. The clinician highly suspects the patient has continued to display 

pharyngeal residue and the pharyngeal residue is causing aspiration following 

the swallow (due to the continuous throat clearing and delayed coughing post 

swallow response). 





Recommendations:


- The patient should remain N.P.O. with total nutrition, hydration, and 

medication via PEG.


- Frequent and excellent oral care to reduce the transfer of oral bacteria to 

the lungs should aspiration of secretions occur.


- Speech pathology to re-assess the oropharyngeal swallowing function, as 

appropriate.





At this time, the speech pathologist suspects a poor prognosis for the 

oropharyngeal swallowing function. 





Due to the patient's history of fluctuating aspiration, the patient's son's 

reports of suspected s/s of suspected aspiration with thin liquids, the 

patient's current level of lethargy, and her inability to consistency follow 

verbal instructions, the clinician suspects a high risk of aspiration with P.O. 

intake. Prior to recommendations of an oral diet, speech pathology would 

recommend a modified barium swallow evaluation when/if the patient is 

appropriate and able to participate.





Assessment


Assessment Current Status:  Poor Progress





Treatment Plan


Continue Plan of Care





Speech Short Term Goals


Short Term Goals


Short Term Goals


1. The patient will tolerate trials of the least restrictive consistency without

s/s of suspected aspiration.


Time Frame-STG:  Two Days.





Speech Long Term Goals


Long Term Goals


1. The patient will demonstrate tolerance of the least restrictive diet 

consistency without s/s of suspected aspiration.


Time Frame:  Six Weeks.





Speech-Plan


Treatment Plan


Speech Therapy Treatment Plan:  Continue Plan of Care


Treatment Duration:  Nov 4, 2022


Frequency:  2 times per week


Estimated Hrs Per Day:  .25 hour per day


Rehab Potential:  Poor





Safety Risks/Education


Teaching Recipient:  Patient


Teaching Methods:  Discussion


Response to Teaching:  Unable to Comprehend


Education Topics Provided:  


Results, Recommendations, Plan of Care





Time


Speech Therapy Time In:  08:10


Speech Therapy Time Out:  08:30


DATE:  Nov 4, 2022


Total Billed Time:  20


Billed Treatment Time


1MARIA A ELIZABETH                 Nov 4, 2022 09:37

## 2022-11-04 NOTE — PHYSICIAN QUERY CLARIFICATION
Physician Query-General


Query to Physician:


The medical record reflects the following clinical evidence:





Clinical Indicators: Shortness of air at rest on admission, was started on 

oxygen at 5 L has been as high as 6 L, respiratory rate 20 on admission has been

mostly 18-20, initial O2 sats 77% on room air O2 sat 94 to 97% on oxygen, was 

96% on 5 L in ER (P/F=215)


Risk Factor(s): Advanced age, Pneumonia


Treatment:  Continuous Supplemental 02 up to 6L, Infection treatment: Vancomycin

IV, normal saline 500 mL, added Zosyn, Speech Therapy eval/Dysphagia screening





1. Acute respiratory failure with hypoxia, present on admission


2. Other explanation of clinical findings


3. Unable to determine (no explanation for clinical findings)





Please clarify and document your clinical opinion in the progress notes and 

discharge summary including the definitive and/or presumptive diagnosis, 

(suspected or probable), related to the above clinical findings. Please include 

clinical findings supporting your diagnosis.





Sugar Cardoso RN, MSN


Clinical Documentation 


[phone]


[email or web address]





PHYSICIAN RESPONSE:


Based on the clinical findings in the record, please respond to the query above 

on this document as an addendum. 








Physician Response:


Physician Response


1














If you have questions please contact:


                   


:


Ext:





Thank you for your time and cooperation.


Clinical /








*********************This is a permanent part of the medical 

record*******************











SUGAR CARDOSO                    Nov 4, 2022 17:36


JASON RODRIGUEZ DO                 Nov 4, 2022 20:24

## 2022-11-04 NOTE — PROGRESS NOTE
Subjective


Date Seen by a Provider:  Nov 4, 2022


Time Seen by a Provider:  10:00


Subjective/Events-last exam


Pt is about the same


Hemoglobin is 7.7


Potassium low


Family thinks she has thrush so we will initiate Diflucan


PEG tube will need tube feedings per dietary


Overall very poor prognosis


Review of Systems


Neurological:  Confusion





Objective


Exam


Last Set of Vital Signs





Vital Signs








  Date Time  Temp Pulse Resp B/P (MAP) Pulse Ox O2 Delivery O2 Flow Rate FiO2


 


11/4/22 08:00     98 Nasal Cannula 3.00 


 


11/4/22 07:21 36.0 64 18 150/    





Capillary Refill : Less Than 3 Seconds


I&O











Intake and Output 


 


 11/4/22





 00:00


 


Intake Total 0 ml


 


Output Total 1525 ml


 


Balance -1525 ml


 


 


 


Intake Oral 0 ml


 


Output Urine Total 1525 ml


 


# Bowel Movements 2








General:  Other (semi-comatose)


Lungs:  Clear to Auscultation


Heart:  Regular Rate





Results


Lab


Laboratory Tests


11/4/22 05:20: 


White Blood Count 9.4, Red Blood Count 2.63L, Hemoglobin 7.7L, Hematocrit 25L, 

Mean Corpuscular Volume 93, Mean Corpuscular Hemoglobin 29, Mean Corpuscular 

Hemoglobin Concent 31L, Red Cell Distribution Width 17.5H, Platelet Count 188, 

Mean Platelet Volume 10.3, Immature Granulocyte % (Auto) 0, Neutrophils (%) 

(Auto) 63, Lymphocytes (%) (Auto) 25, Monocytes (%) (Auto) 9, Eosinophils (%) 

(Auto) 3, Basophils (%) (Auto) 0, Neutrophils # (Auto) 5.9, Lymphocytes # (Auto)

2.3, Monocytes # (Auto) 0.9, Eosinophils # (Auto) 0.3, Basophils # (Auto) 0.0, 

Immature Granulocyte # (Auto) 0.0, Sodium Level 143, Potassium Level 3.2L, 

Chloride Level 114H, Carbon Dioxide Level 20L, Anion Gap 9, Blood Urea Nitrogen 

15, Creatinine 0.78, Estimat Glomerular Filtration Rate 73, BUN/Creatinine Ratio

19, Glucose Level 116H, Calcium Level 9.4, Corrected Calcium 10.6H, Total 

Bilirubin 0.6, Aspartate Amino Transf (AST/SGOT) 16, Alanine Aminotransferase 

(ALT/SGPT) 52, Alkaline Phosphatase 171H, Total Protein 5.2L, Albumin 2.5L





Microbiology


11/1/22 Urine Culture - Preliminary, Resulted


          Enterococcus faecalis





Assessment/Plan


Assessment/Plan


Assess & Plan/Chief Complaint


Start TF per PEG tube





Clinical Quality Measures


Admission Status


Admission Dx


Assessment:


Hypoxia


Pneumonia


UTI facility acquired


GI bleed


Vascular dementia


PEG tube





Plan:


IV antibiotics


Needs DNR


Surgery consultation patient is





DVT/VTE Risk/Contraindication:


Contraindications-Pharm:  Other *list below*


Other:  


gi bleed











JASON RODRIGUEZ DO                 Nov 4, 2022 11:40

## 2022-11-05 NOTE — PROGRESS NOTE - HOSPITALIST
Subjective


HPI/CC On Admission


Date Seen by Provider:  Nov 5, 2022


Time Seen by Provider:  11:21


Subjective/Events-last exam


Patient nonverbal does not open eyes but just the active listening  with 

stethoscope leads to nonpurposeful movement of the arms.  Nursing staff report 

that she has been trying to pull lines out requiring mittens which do not appear

to be bothering her.  No other care problems reported per staff.





Objective


Exam


Vital Signs





Vital Signs








  Date Time  Temp Pulse Resp B/P (MAP) Pulse Ox O2 Delivery O2 Flow Rate FiO2


 


11/5/22 11:11 36.2 67 18 113/58 (76) 98 Nasal Cannula 3.00 





Capillary Refill : Less Than 3 Seconds


General Appearance:  No Apparent Distress, Chronically ill


Respiratory:  No Accessory Muscle Use, No Respiratory Distress, Other (Scattered

rhonchi without wheezing)


Cardiovascular:  No JVD, No Murmur, Irregularly Irregular





Results/Procedures


Lab


Laboratory Tests


11/5/22 05:00








Patient resulted labs reviewed.





Assessment/Plan


Assessment and Plan


Assess & Plan/Chief Complaint


1.  Likely aspiration pneumonitis with hypoxemia exacerbating underlying 

end-stage vascular dementia that had required PEG tube placement.  Prognosis 

extremely poor but family not agreeable to DNR status or consideration for 

hospice at this time.  Continue current medical management.





Critical Care


Critically Ill Patient





Clinical Quality Measures


DVT/VTE Risk/Contraindication:


Contraindications-Pharm:  Other *list below*


Other:  


CAROL Patiño MD               Nov 5, 2022 11:25

## 2022-11-05 NOTE — PROGRESS NOTE - SURGERY
YOLANDA PETERSON 11/5/22 0619:


Subjective


Date Seen by a Provider:  Nov 5, 2022


Time Seen by a Provider:  06:14


Subjective/Events-last exam


Patient is laying in bed relaxing comfortably


Physical exam elicited no pain except in her left shoulder


Last reported bowel movement yesterday was loose and less dark compared to 

yesterday


Tube feeding have begun, no blood seen in tube


Labs reviewed





Objective


Exam





Vital Signs








  Date Time  Temp Pulse Resp B/P (MAP) Pulse Ox O2 Delivery O2 Flow Rate FiO2


 


11/5/22 04:16 36.5 88 16 172/94 (120) 95 Nasal Cannula 3.00 


 


11/5/22 00:00 36.3 81 18 124/71 (88) 97 Nasal Cannula 3.00 


 


11/4/22 21:28      Nasal Cannula 2.50 


 


11/4/22 20:08      Nasal Cannula 3.00 


 


11/4/22 19:08 37.1 82 19 165/66 (99) 98 Nasal Cannula 3.00 


 


11/4/22 15:36 36.3 76 19 175/76 (109) 91 Nasal Cannula 2.00 


 


11/4/22 12:11 35.8 54 18 143/63 (89) 98 Nasal Cannula 3.00 


 


11/4/22 08:00     98 Nasal Cannula 3.00 


 


11/4/22 07:21 36.0 64 18 150/  Nasal Cannula 69.00 





       3.00 














I & O 


 


 11/5/22





 07:00


 


Intake Total 2640 ml


 


Output Total 1800 ml


 


Balance 840 ml





Capillary Refill : Less Than 3 Seconds


General Appearance:  No Apparent Distress, Chronically ill, Thin


HEENT:  PERRL/EOMI, Normal ENT Inspection, Pale Conjunctivae (L), Pale 

Conjunctivae (R)


Neck:  Non Tender, Supple


Respiratory:  Chest Non Tender, No Accessory Muscle Use, No Respiratory Distress


Cardiovascular:  Regular Rate, Rhythm, No JVD


Peripheral Pulses:  1+ Radial Pulses (R), 1+ Radial Pulses (L)


Gastrointestinal:  non tender, soft, other (gastrostomy tube LUQ, no blood at 

this time)


Extremity:  Normal Inspection, Non Tender, Other (Left shoulder tender to 

palpation)


Neurologic/Psychiatric:  Alert; No Oriented x3


Skin:  Normal Color, Warm/Dry


Lymphatic:  No Adenopathy





Results


Lab


Laboratory Tests


11/5/22 05:00: 


Sodium Level 141, Potassium Level 2.9L, Carbon Dioxide Level 19L, Anion Gap 11, 

Blood Urea Nitrogen 12, Creatinine 0.74, Estimat Glomerular Filtration Rate 78, 

BUN/Creatinine Ratio 16, Glucose Level 172H, Calcium Level 8.6, Corrected 

Calcium 10.0, Total Bilirubin 0.4, Aspartate Amino Transf (AST/SGOT) 18, Alanine

Aminotransferase (ALT/SGPT) 42, Alkaline Phosphatase 149H, Total Protein 4.8L, 

Albumin 2.3L





Microbiology


11/1/22 Urine Culture - Final, Complete


          Enterococcus faecalis


          Enterococcus faecalis#2





Assessment/Plan








GI bleed-likely upper GI


Hypoxia


Atypical lung infection/


Leukocytosis-currently in range


NICKY-resolved


UTI


PEG tube aspiration


Vascular Dementia-non verbal





Continue tube feeds


Continue Protonix


Follow hemoglobin and transfuse as needed- Currently 7.6 from 7.7 yesterday


Continue to check for signs of blood loss


Needs DNR





Clinical Quality Measures


DVT/VTE Risk/Contraindication:


Contraindications-Pharm:  Other *list below*


Other:  


gi bleed





KAR LANIER DO 11/5/22 1606:


Subjective


Subjective/Events-last exam


Patient laying in bed. Hgb stable Tolerating tube feeds. No family at bedside. 

Patient fatigued. No new complaints.





Objective


Exam


General Appearance:  No Apparent Distress, Chronically ill, Thin


HEENT:  PERRL/EOMI, Normal ENT Inspection


Neck:  Non Tender, Supple


Respiratory:  Chest Non Tender, No Accessory Muscle Use, No Respiratory Distress


Cardiovascular:  Regular Rate, Rhythm, No JVD


Gastrointestinal:  non tender, soft, other (gastrostomy tube LUQ, no blood at 

this time)


Extremity:  Normal Inspection, Non Tender


Neurologic/Psychiatric:  Alert; No Oriented x3


Skin:  Normal Color, Warm/Dry


Lymphatic:  No Adenopathy





Assessment/Plan





GI bleed-likely upper GI


Hypoxia


Atypical lung infection/


Leukocytosis


NICKY-resolved


UTI


PEG tube aspiration


Vascular Dementia-non verbal





Continue tube feeds


Continue Protonix


Follow hemoglobin and transfuse as needed- Currently stable 7.6 from 7.7 

yesterday


Continue to check for signs of blood loss











Supervisory-Addendum Brief


Verification & Attestation


Participated in pt care:  history, MDM, physical


Personally performed:  exam, history, MDM, supervision of care


Care discussed with:  Medical Student


Procedures:  n/a


Results interpretation:  Verified all documentation


Verification and Attestation of Medical Student E/M Service





A medical student performed and documented this service in my presence. I 

reviewed and verified all information documented by the medical student and made

modifications to such information, when appropriate. I personally performed the 

physical exam and medical decision making. 





 Kar Lanier, Nov 5, 2022,16:06











YOLANDA PETERSON                 Nov 5, 2022 06:19


KAR LANIER DO               Nov 5, 2022 16:06

## 2022-11-06 NOTE — PROGRESS NOTE - SURGERY
YOLANDA PETERSON 11/6/22 0653:


Subjective


Date Seen by a Provider:  Nov 6, 2022


Time Seen by a Provider:  11:00


Subjective/Events-last exam


Patient is laying in bed relaxing comfortably


Physical exam elicited no pain except in her left shoulder


Last reported bowel movement yesterday was loose and not dark in color


Tube feeding have begun, no blood seen in tube during any feedings


Labs reviewed





Objective


Exam





Vital Signs








  Date Time  Temp Pulse Resp B/P (MAP) Pulse Ox O2 Delivery O2 Flow Rate FiO2


 


11/6/22 03:40 36.3 58 20 146/66 (92) 99 Nasal Cannula 3.00 


 


11/5/22 23:26 36.2 63 20 130/60 (83) 99 Nasal Cannula 3.00 


 


11/5/22 22:40 36.7 72   98   


 


11/5/22 20:30      Nasal Cannula 3.00 


 


11/5/22 20:20 36.7 72 16 121/58 (79) 99 Room Air  


 


11/5/22 16:03 36.6 59 16 149/68 (95) 98 Room Air  


 


11/5/22 11:11 36.2 67 18 113/58 (76) 98 Nasal Cannula 3.00 


 


11/5/22 08:58      Nasal Cannula 3.00 


 


11/5/22 08:27      Nasal Cannula 3.00 


 


11/5/22 07:28 36.3 65 18 135/60 (85) 98 Nasal Cannula 3.00 














I & O 


 


 11/6/22





 07:00


 


Intake Total 2880 ml


 


Output Total 550 ml


 


Balance 2330 ml





Capillary Refill : Less Than 3 Seconds


General Appearance:  No Apparent Distress, Chronically ill, Thin


HEENT:  PERRL/EOMI, Normal ENT Inspection


Neck:  Non Tender, Supple


Respiratory:  Chest Non Tender, No Accessory Muscle Use, No Respiratory Distress


Cardiovascular:  Regular Rate, Rhythm, No JVD


Peripheral Pulses:  1+ Radial Pulses (R), 1+ Radial Pulses (L)


Gastrointestinal:  non tender, soft, other (gastrostomy tube LUQ, no blood at 

this time)


Extremity:  Normal Inspection, Non Tender


Neurologic/Psychiatric:  Alert; No Oriented x3


Skin:  Normal Color, Warm/Dry


Lymphatic:  No Adenopathy





Results


Lab


Laboratory Tests


11/6/22 06:31: 


White Blood Count 8.6, Red Blood Count 2.56L, Hemoglobin 7.5L, Hematocrit 24L, 

Mean Corpuscular Volume 95, Mean Corpuscular Hemoglobin 29, Mean Corpuscular 

Hemoglobin Concent 31L, Red Cell Distribution Width 17.4H, Platelet Count 207, 

Mean Platelet Volume 9.5, Immature Granulocyte % (Auto) 0, Neutrophils (%) 

(Auto) 64, Lymphocytes (%) (Auto) 23, Monocytes (%) (Auto) 10, Eosinophils (%) 

(Auto) 3, Basophils (%) (Auto) 0, Neutrophils # (Auto) 5.5, Lymphocytes # (Auto)

2.0, Monocytes # (Auto) 0.9, Eosinophils # (Auto) 0.3, Basophils # (Auto) 0.0, 

Immature Granulocyte # (Auto) 0.0





Microbiology


11/1/22 Urine Culture - Final, Complete


          Enterococcus faecalis


          Enterococcus faecalis#2





Assessment/Plan


GI bleed-likely upper GI


Hypoxia-improving


Atypical lung infection-improving


Leukocytosis-currently within normal range


NICKY-resolved


UTI


PEG tube aspiration-non currently 


Vascular Dementia-non verbal





Continue tube feeds


Continue Protonix


Follow hemoglobin and transfuse as needed- Currently stable 7.5 from 7.6 

yesterday


Continue to check for signs of blood loss


Family does not want DNR at this time














Clinical Quality Measures


DVT/VTE Risk/Contraindication:


Contraindications-Pharm:  Other *list below*


Other:  


gi bleed





KAR LANIER DO 11/6/22 1413:


Subjective


Subjective/Events-last exam


Laying in bed. Tolerating tube feeds. No family at bedside. Having bowel 

function.


Hgb stable.





Objective


Exam


General Appearance:  No Apparent Distress, Chronically ill, Thin


HEENT:  PERRL/EOMI, Normal ENT Inspection


Neck:  Non Tender, Supple


Respiratory:  Chest Non Tender, No Accessory Muscle Use, No Respiratory Distress


Cardiovascular:  Regular Rate, Rhythm, No JVD


Gastrointestinal:  non tender, soft, other (gastrostomy tube LUQ, no blood at 

this time)


Extremity:  Normal Inspection, Non Tender


Neurologic/Psychiatric:  Alert; No Oriented x3


Skin:  Normal Color, Warm/Dry


Lymphatic:  No Adenopathy





Assessment/Plan


GI bleed-likely upper GI


Hypoxia-improving


Atypical lung infection-improving


Leukocytosis-currently within normal range


NICKY-resolved


UTI


PEG tube aspiration-non currently 


Vascular Dementia-non verbal





Continue tube feeds


Continue Protonix


Follow hemoglobin and transfuse as needed- Currently stable 7.5 from 7.6 

yesterday


Continue to check for signs of blood loss





Will sign off, call if needed.





Supervisory-Addendum Brief


Verification & Attestation


Participated in pt care:  history, MDM, physical


Personally performed:  exam, history, MDM, supervision of care


Care discussed with:  Medical Student


Procedures:  n/a


Results interpretation:  Verified all documentation


Verification and Attestation of Medical Student E/M Service





A medical student performed and documented this service in my presence. I 

reviewed and verified all information documented by the medical student and made

modifications to such information, when appropriate. I personally performed the 

physical exam and medical decision making. 





 Kar Lanier, Nov 6, 2022,14:13











YOLANDA PETERSON                 Nov 6, 2022 06:53


KAR LANIER DO               Nov 6, 2022 14:13

## 2022-11-06 NOTE — PROGRESS NOTE - HOSPITALIST
Subjective


HPI/CC On Admission


Date Seen by Provider:  Nov 6, 2022


Time Seen by Provider:  11:43


Subjective/Events-last exam


No significant behavioral problems does get agitated when stimulated nonverbal 

no meaningful following of commands reported.  No care issues reported per 

nursing staff.  Patient sleeping I did not arouse her she only gets agitated and

at baseline is noncommunicative.





Objective


Exam


Vital Signs





Vital Signs








  Date Time  Temp Pulse Resp B/P (MAP) Pulse Ox O2 Delivery O2 Flow Rate FiO2


 


11/6/22 11:25 36.7 88 16 137/74 (95) 97 Nasal Cannula 3.00 





Capillary Refill : Less Than 3 Seconds


General Appearance:  No Apparent Distress, Chronically ill


Respiratory:  Chest Non Tender, Lungs Clear, Normal Breath Sounds, No Accessory 

Muscle Use, No Respiratory Distress


Cardiovascular:  Regular Rate, Rhythm, No Edema, No Gallop, No JVD, No Murmur, 

Normal Peripheral Pulses


Gastrointestinal:  Normal Bowel Sounds





Results/Procedures


Lab


Laboratory Tests


11/6/22 06:31








Patient resulted labs reviewed.





Assessment/Plan


Assessment and Plan


Assess & Plan/Chief Complaint


1.  Likely aspiration pneumonitis with hypoxemia exacerbating underlying end-

stage vascular dementia that had required PEG tube placement.Respiratory status 

has been stable transfer back to the nursing home in the morning.  Prognosis 

extremely poor but family not agreeable to DNR status or consideration for 

hospice at this time.  Continue current medical management.





Critical Care


Critically Ill Patient





Clinical Quality Measures


DVT/VTE Risk/Contraindication:


Contraindications-Pharm:  Other *list below*


Other:  


gi bleCAROL Espitia MD               Nov 6, 2022 11:45

## 2022-11-07 NOTE — PROGRESS NOTE
MARKEL GOODWIN 11/7/22 1010:


Progress Note


Patient is an 87-year-old female with a history of vascular dementia, HTN, and 

Afib who presented to the ED on 10/31 for hematemesis and hypoxia. Patient was 

found to have aspiration pneumonia and a UTI for which she was placed on 

vancomycin and zosyn. Patient was started on feeds of 120mls of Jevity 1.5 4x a 

day through her PEG tube on 11/4 and has been tolerated well. Surgery was 

consulted for a possible GI bleed. The patient was started on potassium 

replacement on 11/3 after being found to be hypokalemic. The patient remains 

nonverbal which is her baseline. The patient is scheduled to be discharged back 

to [facility].





RACHELLE HEWITT DO 11/7/22 2026:


Supervisory-Addendum Brief


Verification & Attestation


Participated in pt care:  history, MDM, physical


Personally performed:  exam, history, MDM, supervision of care


Care discussed with:  Medical Student


Procedures:  n/a


Results interpretation:  Verified all documentation


Verification and Attestation of Medical Student E/M Service





A medical student performed and documented this service in my presence. I 

reviewed and verified all information documented by the medical student and made

modifications to such information, when appropriate. I personally performed the 

physical exam and medical decision making. 





 Rachelle Hewitt, Nov 7, 2022,20:24











MARKEL GOODWIN                      Nov 7, 2022 10:10


RACHELLE HEWITT DO                 Nov 7, 2022 20:26

## 2022-11-07 NOTE — DISCHARGE INST-SKILLED NURSING
Discharge Inst-Skilled NF


Reconcile Patient Problems


Problems Reviewed?:  Yes





Patient Instructions


Patient Problems:  


debility





Consult/Follow Up/Orders


Follow Up Appt.:  


[facility] rounds dr hewitt and jagdish baltazar


Skilled NF Admit to:  [facility]


Certification ([facility])


I certify that [facility] services are required to be given on an inpatient basis 

because of the above named patient's need for skilled nursing care on a 

continuing basis for the conditions(s) for which he/she was receiving inpatient 

hospital services prior to his/her transfer to the [facility].


Skilled Nursing Facility Order:  Nursing Services, Occupational Ther-Evaluate & 

Treat, Physical Therapy-Evaluate & Treat, Speech Language-Evaluate & Treat


Oxygen Delivery Method:  Nasal Cannula


Discharge Diet:  Tube Feeding


Daily Activity as Tolerated:  Yes


Resuscitation Status:  Full Code





New & Resume Previous Orders


New Medications:  


Famotidine (Pepcid) 20 Mg Tablet


20 MG PEG BID, #60 TAB





Potassium Bicarbonate/Cit AC (Effer-K 20 Meq Tablet Eff) 20 Meq Tablet.eff


20 MEQ PEG DAILY@0700, #30 TAB





 


Continued Medications:  


Acetaminophen (Acetaminophen) 500 Mg Tablet


1000 MG PO TID, TAB





Acetaminophen (Acetaminophen) 325 Mg/10.15 Ml Soln


20.3 ML PO Q6H PRN for PAIN-MILD (1-4), EA





Alendronate Sodium (Alendronate Sodium) 70 Mg Tablet


70 MG PO FRI, TAB





Allopurinol (Allopurinol) 100 Mg Tablet


100 MG PO DAILY, TAB





Amlodipine Besylate (Amlodipine Besylate) 5 Mg Tablet


5 MG PO DAILY, TAB


HOLD FOR SBP <100 OR PULSE LESS THAN 60


Ascorbic Acid (Vitamin C) 1,000 Mg Tablet


1000 MG PO DAILY, TAB





Aspirin (Aspirin) 81 Mg Tab.chew


81 MG PO HS, TAB





Carboxymethylcellulose Sodium (Refresh Tears) 0.5 % Drops


1 DROP OU QID, DROPS





Cholecalciferol (Vitamin D3) (Vitamin D3) 125 Mcg (5000 Unit) Capsule


125 MCG PO DAILY, CAP





Diclofenac Sodium (Diclofenac Sodium) 1 % Gel..gram.


2 GM TOP QID PRN for PAIN-BREAKTHROUGH, EA


APPLY TO AFFECTED AREA


Doxazosin Mesylate (Doxazosin Mesylate) 1 Mg Tablet


1 MG PO DAILY, TAB





Eyelid Cleanser Combination #5 (Ocusoft Lid Scrub) 1 Each Med..pad


1 EACH TP DAILY, EA





Lactulose (Lactulose) 10 Gram/15 Ml Solution


10 GM PO BID, EA





Multivitamin (Multivitamin) 1 Each Tablet


1 EACH PO DAILY, TAB





Polyethylene Glycol 3350 (Miralax) 17 Gram Powd.pack


17 GM PO HS, EACH





Polymyxin B Sulf/Trimethoprim (Polytrim Eye Drops) 10,000 Unit-1 Mg/Ml Drops


1 DROP OU TID, DROPS





Quetiapine Fumarate (Quetiapine Fumarate) 25 Mg Tablet


12.5 MG PO HS, TAB


TAKES  OF A 25MG TAB


Sennosides/Docusate Sodium (Senna Plus Tablet) 8.6 Mg-50 Mg Tablet


1 EACH PO Q12H, TAB





 


Discontinued Medications:  


Sulfamethoxazole/Trimethoprim (Bactrim Ds Tablet) 1 Each Tablet


1 EACH PO BID, TAB


START DATE 10-- STOP DATE 11-





Rachelle Hewitt 


Nov 7, 2022 


10:17











RACHELLE HEWITT DO                 Nov 7, 2022 10:18

## 2022-11-07 NOTE — DISCHARGE SUMMARY
Diagnosis/Chief Complaint


Date of Admission


Nov 1, 2022 at 04:41


Date of Discharge





Discharge Date:  Nov 7, 2022


Discharge Diagnosis


Assessment:


Encephalopathy


Dementia


Hypoxia


Pneumonia


UTI facility acquired


GI bleed


Vascular dementia


PEG tube





Reason Hospital Visit


CC: Pneumonia with UTI and hematemesis


HPI: This is an 87 yr old female nursing home pt that Gabriela Ignacio and I have been 

taking care of. She was set to go home today when she presented to the ER with 

fever and altered mental status. She was found to have pneumonia with a white 

count of 20,000, UTI, and hematemesis. Dr. Lanier will be consulted. Pt was 

placed on Zosyn for aspiration pneumonia and Vancomycin for presumed bacteremia 

facility acquired. Spoke with DPOA and we will talk more about DNR since she 

appears to be very debilitated.





Discharge Summary


Discharge Physical Examination


Allergies:  


Coded Allergies:  


     melatonin (Verified  Allergy, Unknown, 5/22/22)


     quetiapine (Verified  Adverse Reaction, Unknown, CONFUSION, 11/4/22)


   confusion


Vitals & I&Os





Vital Signs








  Date Time  Temp Pulse Resp B/P (MAP) Pulse Ox O2 Delivery O2 Flow Rate FiO2


 


11/7/22 11:49 35.9 77 18 170/70 (103) 93 Nasal Cannula 3.00 








General Appearance:  Other (sleeping)


Respiratory:  Clear to Auscultation


Cardiovascular:  Regular Rate





Hospital Course


Was the Problem List Reviewed?:  Yes


Patient is an 87-year-old female with a history of vascular dementia, HTN, and 

Afib who presented to the ED on 10/31 for hematemesis and hypoxia. Patient was 

found to have aspiration pneumonia and a UTI for which she was placed on 

vancomycin and zosyn. Patient was started on feeds of 120mls of Jevity 1.5 4x a 

day through her PEG tube on 11/4 and has been tolerated well. Surgery was 

consulted for a possible GI bleed. The patient was started on potassium 

replacement on 11/3 after being found to be hypokalemic. The patient remains 

nonverbal which is her baseline. The patient is scheduled to be discharged back 

to [facility].


Labs (last 24 hrs)


Laboratory Tests


11/1/22 02:54: 


Blood Gas Puncture Site LR, Blood Gas Patient Temperature 36.7, Arterial Blood 

pH 7.44H, Arterial Blood Partial Pressure CO2 44, Arterial Blood Partial 

Pressure O2 50L, Arterial Blood HCO3 30H, Arterial Blood Total CO2 31.0, 

Arterial Blood Oxygen Saturation 85L, Arterial Blood Base Excess 5.5H, Jeremi 

Test YES-POS, Blood Gas Ventilator Setting NO, Blood Gas Inspired Oxygen 6L


11/1/22 03:10: 


White Blood Count 20.9H, Red Blood Count 3.72L, Hemoglobin 10.9L, Hematocrit 34L

, Mean Corpuscular Volume 90, Mean Corpuscular Hemoglobin 29, Mean Corpuscular 

Hemoglobin Concent 32, Red Cell Distribution Width 18.5H, Platelet Count 242, 

Mean Platelet Volume 9.9, Immature Granulocyte % (Auto) 1, Neutrophils (%) 

(Auto) 86H, Lymphocytes (%) (Auto) 8L, Monocytes (%) (Auto) 6, Eosinophils (%) 

(Auto) 0, Basophils (%) (Auto) 0, Neutrophils # (Auto) 18.0H, Lymphocytes # 

(Auto) 1.6, Monocytes # (Auto) 1.2H, Eosinophils # (Auto) 0.0, Basophils # 

(Auto) 0.0, Immature Granulocyte # (Auto) 0.1, Neutrophils % (Manual) 84, 

Lymphocytes % (Manual) 9, Monocytes % (Manual) 7, Blood Morphology Comment 

NORMAL, Prothrombin Time 12.7, INR Comment 0.9, Sodium Level 139, Potassium 

Level 4.2, Chloride Level 100, Carbon Dioxide Level 24, Anion Gap 15H, Blood 

Urea Nitrogen 62H, Creatinine 1.78H, Estimat Glomerular Filtration Rate 27, 

BUN/Creatinine Ratio 35, Glucose Level 118H, Calcium Level 14.2*H, Corrected 

Calcium 14.6H, Total Bilirubin 0.6, Aspartate Amino Transf (AST/SGOT) 41H, 

Alanine Aminotransferase (ALT/SGPT) 174H, Alkaline Phosphatase 329H, Total 

Protein 7.1, Albumin 3.5


11/1/22 04:09: 


D-Dimer 2.41H, Procalcitonin 0.94H, Influenza Type A (RT-PCR) Not Detected, 

Influenza Type B (RT-PCR) Not Detected, SARS-CoV-2 RNA (RT-PCR) Not Detected


11/1/22 04:33: 


Urine Color YELLOW, Urine Clarity TURBID, Urine pH 5.0, Urine Specific Gravity 

>=1.030, Urine Protein TRACEH, Urine Glucose (UA) NEGATIVE, Urine Ketones 

NEGATIVE, Urine Nitrite NEGATIVE, Urine Bilirubin NEGATIVE, Urine Urobilinogen 

0.2, Urine Leukocyte Esterase 2+H, Urine RBC (Auto) TRACE-IH, Urine RBC 10-25H, 

Urine WBC >100H, Urine Squamous Epithelial Cells 2-5, Urine Crystals PRESENTH, 

Urine Calcium Oxalate Crystals RAREH, Urine Bacteria FEWH, Urine Casts NONE, 

Urine Mucus SMALLH, Urine Other , Urine Culture Indicated YES


11/2/22 05:20: 


White Blood Count 11.5H, Red Blood Count 3.07L, Hemoglobin 8.9L, Hematocrit 29L,

Mean Corpuscular Volume 95, Mean Corpuscular Hemoglobin 29, Mean Corpuscular 

Hemoglobin Concent 31L, Red Cell Distribution Width 18.3H, Platelet Count 188, 

Mean Platelet Volume 9.9, Immature Granulocyte % (Auto) 0, Neutrophils (%) 

(Auto) 74, Lymphocytes (%) (Auto) 16, Monocytes (%) (Auto) 8, Eosinophils (%) 

(Auto) 1, Basophils (%) (Auto) 0, Neutrophils # (Auto) 8.6H, Lymphocytes # 

(Auto) 1.8, Monocytes # (Auto) 0.9, Eosinophils # (Auto) 0.1, Basophils # (Auto)

0.0, Immature Granulocyte # (Auto) 0.0, Sodium Level 142, Potassium Level 3.7, 

Chloride Level 111H, Carbon Dioxide Level 20L, Anion Gap 11, Blood Urea Nitrogen

42H, Creatinine 1.12, Estimat Glomerular Filtration Rate 48, BUN/Creatinine 

Ratio 38, Glucose Level 83, Calcium Level 11.2H, Corrected Calcium 12.2H, Total 

Bilirubin 0.5, Aspartate Amino Transf (AST/SGOT) 27, Alanine Aminotransferase 

(ALT/SGPT) 96H, Alkaline Phosphatase 214H, Total Protein 5.8L, Albumin 2.8L, 

Vancomycin Level Trough 8.6L


11/2/22 20:09: Glucometer 65L


11/3/22 05:05: 


White Blood Count 10.7, Red Blood Count 2.77L, Hemoglobin 8.1L, Hematocrit 26L, 

Mean Corpuscular Volume 93, Mean Corpuscular Hemoglobin 29, Mean Corpuscular 

Hemoglobin Concent 31L, Red Cell Distribution Width 17.6H, Platelet Count 195, 

Mean Platelet Volume 9.8, Immature Granulocyte % (Auto) 0, Neutrophils (%) 

(Auto) 72, Lymphocytes (%) (Auto) 17, Monocytes (%) (Auto) 9, Eosinophils (%) 

(Auto) 2, Basophils (%) (Auto) 0, Neutrophils # (Auto) 7.7, Lymphocytes # (Auto)

1.9, Monocytes # (Auto) 0.9, Eosinophils # (Auto) 0.2, Basophils # (Auto) 0.0, 

Immature Granulocyte # (Auto) 0.0, Sodium Level 142, Potassium Level 3.0L, 

Chloride Level 109H, Carbon Dioxide Level 22, Anion Gap 11, Blood Urea Nitrogen 

25H, Creatinine 0.84, Estimat Glomerular Filtration Rate 67, BUN/Creatinine 

Ratio 30, Glucose Level 113H, Calcium Level 9.9, Corrected Calcium 11.0H, Total 

Bilirubin 0.6, Aspartate Amino Transf (AST/SGOT) 19, Alanine Aminotransferase 

(ALT/SGPT) 65H, Alkaline Phosphatase 170H, Total Protein 5.3L, Albumin 2.6L, 

Vancomycin Level Trough 12.1


11/4/22 05:20: 


White Blood Count 9.4, Red Blood Count 2.63L, Hemoglobin 7.7L, Hematocrit 25L, 

Mean Corpuscular Volume 93, Mean Corpuscular Hemoglobin 29, Mean Corpuscular He

moglobin Concent 31L, Red Cell Distribution Width 17.5H, Platelet Count 188, 

Mean Platelet Volume 10.3, Immature Granulocyte % (Auto) 0, Neutrophils (%) 

(Auto) 63, Lymphocytes (%) (Auto) 25, Monocytes (%) (Auto) 9, Eosinophils (%) 

(Auto) 3, Basophils (%) (Auto) 0, Neutrophils # (Auto) 5.9, Lymphocytes # (Auto)

2.3, Monocytes # (Auto) 0.9, Eosinophils # (Auto) 0.3, Basophils # (Auto) 0.0, 

Immature Granulocyte # (Auto) 0.0, Sodium Level 143, Potassium Level 3.2L, 

Chloride Level 114H, Carbon Dioxide Level 20L, Anion Gap 9, Blood Urea Nitrogen 

15, Creatinine 0.78, Estimat Glomerular Filtration Rate 73, BUN/Creatinine Ratio

19, Glucose Level 116H, Calcium Level 9.4, Corrected Calcium 10.6H, Total 

Bilirubin 0.6, Aspartate Amino Transf (AST/SGOT) 16, Alanine Aminotransferase 

(ALT/SGPT) 52, Alkaline Phosphatase 171H, Total Protein 5.2L, Albumin 2.5L


11/5/22 05:00: 


White Blood Count 8.0, Red Blood Count 2.60L, Hemoglobin 7.6L, Hematocrit 24L, 

Mean Corpuscular Volume 94, Mean Corpuscular Hemoglobin 29, Mean Corpuscular 

Hemoglobin Concent 31L, Red Cell Distribution Width 17.1H, Platelet Count 224, 

Mean Platelet Volume 10.1, Immature Granulocyte % (Auto) 0, Neutrophils (%) 

(Auto) 69, Lymphocytes (%) (Auto) 18, Monocytes (%) (Auto) 9, Eosinophils (%) 

(Auto) 3, Basophils (%) (Auto) 0, Neutrophils # (Auto) 5.6, Lymphocytes # (Auto)

1.5, Monocytes # (Auto) 0.8, Eosinophils # (Auto) 0.2, Basophils # (Auto) 0.0, 

Immature Granulocyte # (Auto) 0.0, Percent Immature Platelet Fraction 2.0, 

Sodium Level 141, Potassium Level 2.9L, Chloride Level 111H, Carbon Dioxide 

Level 19L, Anion Gap 11, Blood Urea Nitrogen 12, Creatinine 0.74, Estimat 

Glomerular Filtration Rate 78, BUN/Creatinine Ratio 16, Glucose Level 172H, 

Calcium Level 8.6, Corrected Calcium 10.0, Total Bilirubin 0.4, Aspartate Amino 

Transf (AST/SGOT) 18, Alanine Aminotransferase (ALT/SGPT) 42, Alkaline 

Phosphatase 149H, Total Protein 4.8L, Albumin 2.3L


11/6/22 06:31: 


White Blood Count 8.6, Red Blood Count 2.56L, Hemoglobin 7.5L, Hematocrit 24L, 

Mean Corpuscular Volume 95, Mean Corpuscular Hemoglobin 29, Mean Corpuscular 

Hemoglobin Concent 31L, Red Cell Distribution Width 17.4H, Platelet Count 207, 

Mean Platelet Volume 9.5, Immature Granulocyte % (Auto) 0, Neutrophils (%) 

(Auto) 64, Lymphocytes (%) (Auto) 23, Monocytes (%) (Auto) 10, Eosinophils (%) 

(Auto) 3, Basophils (%) (Auto) 0, Neutrophils # (Auto) 5.5, Lymphocytes # (Auto)

2.0, Monocytes # (Auto) 0.9, Eosinophils # (Auto) 0.3, Basophils # (Auto) 0.0, 

Immature Granulocyte # (Auto) 0.0, Sodium Level 143, Potassium Level 3.1L, 

Chloride Level 114H, Carbon Dioxide Level 20L, Anion Gap 9, Blood Urea Nitrogen 

16, Creatinine 0.79, Estimat Glomerular Filtration Rate 72, BUN/Creatinine Ratio

20, Glucose Level 163H, Calcium Level 8.8, Corrected Calcium 10.2H, Total 

Bilirubin 0.3, Aspartate Amino Transf (AST/SGOT) 13, Alanine Aminotransferase 

(ALT/SGPT) 31, Alkaline Phosphatase 129, Total Protein 4.8L, Albumin 2.3L


11/7/22 05:09: 


White Blood Count 9.5, Red Blood Count 2.55L, Hemoglobin 7.4L, Hematocrit 24L, 

Mean Corpuscular Volume 95, Mean Corpuscular Hemoglobin 29, Mean Corpuscular 

Hemoglobin Concent 31L, Red Cell Distribution Width 17.3H, Platelet Count 213, 

Mean Platelet Volume 9.6, Immature Granulocyte % (Auto) 0, Neutrophils (%) 

(Auto) 60, Lymphocytes (%) (Auto) 25, Monocytes (%) (Auto) 11, Eosinophils (%) 

(Auto) 3, Basophils (%) (Auto) 0, Neutrophils # (Auto) 5.7, Lymphocytes # (Auto)

2.4, Monocytes # (Auto) 1.1H, Eosinophils # (Auto) 0.2, Basophils # (Auto) 0.0, 

Immature Granulocyte # (Auto) 0.0, Sodium Level 145, Potassium Level 3.1L, 

Chloride Level 115H, Carbon Dioxide Level 21, Anion Gap 9, Blood Urea Nitrogen 

13, Creatinine 0.76, Estimat Glomerular Filtration Rate 76, BUN/Creatinine Ratio

17, Glucose Level 114H, Calcium Level 8.6, Corrected Calcium 10.0, Total 

Bilirubin 0.3, Aspartate Amino Transf (AST/SGOT) 12, Alanine Aminotransferase 

(ALT/SGPT) 28, Alkaline Phosphatase 122, Total Protein 5.0L, Albumin 2.3L





Microbiology


11/1/22 Urine Culture - Final, Complete


          Enterococcus faecalis


          Enterococcus faecalis#2





Pending Labs





Microbiology








 Date/Time


Source Procedure


Growth Status





 


 11/1/22 04:33


Urine Straight Cath, In/Out Urine Culture - Final


Enterococcus faecalis


Enterococcus faecalis#2 Complete





Laboratory Tests


11/1/22 02:54: 


Blood Gas Puncture Site LR, Blood Gas Patient Temperature 36.7, Arterial Blood 

pH 7.44, Arterial Blood Partial Pressure CO2 44, Arterial Blood Partial Pressure

O2 50, Arterial Blood HCO3 30, Arterial Blood Total CO2 31.0, Arterial Blood 

Oxygen Saturation 85, Arterial Blood Base Excess 5.5, Jeremi Test YES-POS, Blood 

Gas Ventilator Setting NO, Blood Gas Inspired Oxygen 6L


11/1/22 03:10: 


White Blood Count 20.9, Red Blood Count 3.72, Hemoglobin 10.9, Hematocrit 34, 

Mean Corpuscular Volume 90, Mean Corpuscular Hemoglobin 29, Mean Corpuscular 

Hemoglobin Concent 32, Red Cell Distribution Width 18.5, Platelet Count 242, 

Mean Platelet Volume 9.9, Immature Granulocyte % (Auto) 1, Neutrophils (%) 

(Auto) 86, Lymphocytes (%) (Auto) 8, Monocytes (%) (Auto) 6, Eosinophils (%) 

(Auto) 0, Basophils (%) (Auto) 0, Neutrophils # (Auto) 18.0, Lymphocytes # 

(Auto) 1.6, Monocytes # (Auto) 1.2, Eosinophils # (Auto) 0.0, Basophils # (Auto)

0.0, Immature Granulocyte # (Auto) 0.1, Neutrophils % (Manual) 84, Lymphocytes %

(Manual) 9, Monocytes % (Manual) 7, Blood Morphology Comment NORMAL, Prothrombin

Time 12.7, INR Comment 0.9, Sodium Level 139, Potassium Level 4.2, Chloride 

Level 100, Carbon Dioxide Level 24, Anion Gap 15, Blood Urea Nitrogen 62, 

Creatinine 1.78, Estimat Glomerular Filtration Rate 27, BUN/Creatinine Ratio 35,

Glucose Level 118, Calcium Level 14.2, Corrected Calcium 14.6, Total Bilirubin 

0.6, Aspartate Amino Transf (AST/SGOT) 41, Alanine Aminotransferase (ALT/SGPT) 

174, Alkaline Phosphatase 329, Total Protein 7.1, Albumin 3.5


11/1/22 04:09: 


D-Dimer 2.41, Procalcitonin 0.94, Influenza Type A (RT-PCR) Not Detected, 

Influenza Type B (RT-PCR) Not Detected, SARS-CoV-2 RNA (RT-PCR) Not Detected


11/1/22 04:33: 


Urine Color YELLOW, Urine Clarity TURBID, Urine pH 5.0, Urine Specific Gravity 

>=1.030, Urine Protein TRACE, Urine Glucose (UA) NEGATIVE, Urine Ketones 

NEGATIVE, Urine Nitrite NEGATIVE, Urine Bilirubin NEGATIVE, Urine Urobilinogen 

0.2, Urine Leukocyte Esterase 2+, Urine RBC (Auto) TRACE-I, Urine RBC 10-25, 

Urine WBC >100, Urine Squamous Epithelial Cells 2-5, Urine Crystals PRESENT, 

Urine Calcium Oxalate Crystals RARE, Urine Bacteria FEW, Urine Casts NONE, Urine

Mucus SMALL, Urine Other , Urine Culture Indicated YES


11/2/22 05:20: 


White Blood Count 11.5, Red Blood Count 3.07, Hemoglobin 8.9, Hematocrit 29, 

Mean Corpuscular Volume 95, Mean Corpuscular Hemoglobin 29, Mean Corpuscular 

Hemoglobin Concent 31, Red Cell Distribution Width 18.3, Platelet Count 188, 

Mean Platelet Volume 9.9, Immature Granulocyte % (Auto) 0, Neutrophils (%) 

(Auto) 74, Lymphocytes (%) (Auto) 16, Monocytes (%) (Auto) 8, Eosinophils (%) 

(Auto) 1, Basophils (%) (Auto) 0, Neutrophils # (Auto) 8.6, Lymphocytes # (Auto)

1.8, Monocytes # (Auto) 0.9, Eosinophils # (Auto) 0.1, Basophils # (Auto) 0.0, 

Immature Granulocyte # (Auto) 0.0, Sodium Level 142, Potassium Level 3.7, 

Chloride Level 111, Carbon Dioxide Level 20, Anion Gap 11, Blood Urea Nitrogen 

42, Creatinine 1.12, Estimat Glomerular Filtration Rate 48, BUN/Creatinine Ratio

38, Glucose Level 83, Calcium Level 11.2, Corrected Calcium 12.2, Total 

Bilirubin 0.5, Aspartate Amino Transf (AST/SGOT) 27, Alanine Aminotransferase 

(ALT/SGPT) 96, Alkaline Phosphatase 214, Total Protein 5.8, Albumin 2.8, 

Vancomycin Level Trough 8.6


11/2/22 20:09: Glucometer 65


11/3/22 05:05: 


White Blood Count 10.7, Red Blood Count 2.77, Hemoglobin 8.1, Hematocrit 26, 

Mean Corpuscular Volume 93, Mean Corpuscular Hemoglobin 29, Mean Corpuscular He

moglobin Concent 31, Red Cell Distribution Width 17.6, Platelet Count 195, Mean 

Platelet Volume 9.8, Immature Granulocyte % (Auto) 0, Neutrophils (%) (Auto) 72,

Lymphocytes (%) (Auto) 17, Monocytes (%) (Auto) 9, Eosinophils (%) (Auto) 2, 

Basophils (%) (Auto) 0, Neutrophils # (Auto) 7.7, Lymphocytes # (Auto) 1.9, 

Monocytes # (Auto) 0.9, Eosinophils # (Auto) 0.2, Basophils # (Auto) 0.0, 

Immature Granulocyte # (Auto) 0.0, Sodium Level 142, Potassium Level 3.0, 

Chloride Level 109, Carbon Dioxide Level 22, Anion Gap 11, Blood Urea Nitrogen 

25, Creatinine 0.84, Estimat Glomerular Filtration Rate 67, BUN/Creatinine Ratio

30, Glucose Level 113, Calcium Level 9.9, Corrected Calcium 11.0, Total 

Bilirubin 0.6, Aspartate Amino Transf (AST/SGOT) 19, Alanine Aminotransferase 

(ALT/SGPT) 65, Alkaline Phosphatase 170, Total Protein 5.3, Albumin 2.6, 

Vancomycin Level Trough 12.1


11/4/22 05:20: 


White Blood Count 9.4, Red Blood Count 2.63, Hemoglobin 7.7, Hematocrit 25, Mean

Corpuscular Volume 93, Mean Corpuscular Hemoglobin 29, Mean Corpuscular 

Hemoglobin Concent 31, Red Cell Distribution Width 17.5, Platelet Count 188, 

Mean Platelet Volume 10.3, Immature Granulocyte % (Auto) 0, Neutrophils (%) 

(Auto) 63, Lymphocytes (%) (Auto) 25, Monocytes (%) (Auto) 9, Eosinophils (%) 

(Auto) 3, Basophils (%) (Auto) 0, Neutrophils # (Auto) 5.9, Lymphocytes # (Auto)

2.3, Monocytes # (Auto) 0.9, Eosinophils # (Auto) 0.3, Basophils # (Auto) 0.0, 

Immature Granulocyte # (Auto) 0.0, Sodium Level 143, Potassium Level 3.2, 

Chloride Level 114, Carbon Dioxide Level 20, Anion Gap 9, Blood Urea Nitrogen 

15, Creatinine 0.78, Estimat Glomerular Filtration Rate 73, BUN/Creatinine Ratio

19, Glucose Level 116, Calcium Level 9.4, Corrected Calcium 10.6, Total 

Bilirubin 0.6, Aspartate Amino Transf (AST/SGOT) 16, Alanine Aminotransferase 

(ALT/SGPT) 52, Alkaline Phosphatase 171, Total Protein 5.2, Albumin 2.5


11/5/22 05:00: 


White Blood Count 8.0, Red Blood Count 2.60, Hemoglobin 7.6, Hematocrit 24, Mean

Corpuscular Volume 94, Mean Corpuscular Hemoglobin 29, Mean Corpuscular 

Hemoglobin Concent 31, Red Cell Distribution Width 17.1, Platelet Count 224, 

Mean Platelet Volume 10.1, Immature Granulocyte % (Auto) 0, Neutrophils (%) 

(Auto) 69, Lymphocytes (%) (Auto) 18, Monocytes (%) (Auto) 9, Eosinophils (%) 

(Auto) 3, Basophils (%) (Auto) 0, Neutrophils # (Auto) 5.6, Lymphocytes # (Auto)

1.5, Monocytes # (Auto) 0.8, Eosinophils # (Auto) 0.2, Basophils # (Auto) 0.0, 

Immature Granulocyte # (Auto) 0.0, Percent Immature Platelet Fraction 2.0, 

Sodium Level 141, Potassium Level 2.9, Chloride Level 111, Carbon Dioxide Level 

19, Anion Gap 11, Blood Urea Nitrogen 12, Creatinine 0.74, Estimat Glomerular 

Filtration Rate 78, BUN/Creatinine Ratio 16, Glucose Level 172, Calcium Level 

8.6, Corrected Calcium 10.0, Total Bilirubin 0.4, Aspartate Amino Transf 

(AST/SGOT) 18, Alanine Aminotransferase (ALT/SGPT) 42, Alkaline Phosphatase 149,

Total Protein 4.8, Albumin 2.3


11/6/22 06:31: 


White Blood Count 8.6, Red Blood Count 2.56, Hemoglobin 7.5, Hematocrit 24, Mean

Corpuscular Volume 95, Mean Corpuscular Hemoglobin 29, Mean Corpuscular 

Hemoglobin Concent 31, Red Cell Distribution Width 17.4, Platelet Count 207, 

Mean Platelet Volume 9.5, Immature Granulocyte % (Auto) 0, Neutrophils (%) 

(Auto) 64, Lymphocytes (%) (Auto) 23, Monocytes (%) (Auto) 10, Eosinophils (%) 

(Auto) 3, Basophils (%) (Auto) 0, Neutrophils # (Auto) 5.5, Lymphocytes # (Auto)

2.0, Monocytes # (Auto) 0.9, Eosinophils # (Auto) 0.3, Basophils # (Auto) 0.0, 

Immature Granulocyte # (Auto) 0.0, Sodium Level 143, Potassium Level 3.1, 

Chloride Level 114, Carbon Dioxide Level 20, Anion Gap 9, Blood Urea Nitrogen 

16, Creatinine 0.79, Estimat Glomerular Filtration Rate 72, BUN/Creatinine Ratio

20, Glucose Level 163, Calcium Level 8.8, Corrected Calcium 10.2, Total 

Bilirubin 0.3, Aspartate Amino Transf (AST/SGOT) 13, Alanine Aminotransferase 

(ALT/SGPT) 31, Alkaline Phosphatase 129, Total Protein 4.8, Albumin 2.3


11/7/22 05:09: 


White Blood Count 9.5, Red Blood Count 2.55, Hemoglobin 7.4, Hematocrit 24, Mean

Corpuscular Volume 95, Mean Corpuscular Hemoglobin 29, Mean Corpuscular 

Hemoglobin Concent 31, Red Cell Distribution Width 17.3, Platelet Count 213, 

Mean Platelet Volume 9.6, Immature Granulocyte % (Auto) 0, Neutrophils (%) 

(Auto) 60, Lymphocytes (%) (Auto) 25, Monocytes (%) (Auto) 11, Eosinophils (%) 

(Auto) 3, Basophils (%) (Auto) 0, Neutrophils # (Auto) 5.7, Lymphocytes # (Auto)

2.4, Monocytes # (Auto) 1.1, Eosinophils # (Auto) 0.2, Basophils # (Auto) 0.0, 

Immature Granulocyte # (Auto) 0.0, Sodium Level 145, Potassium Level 3.1, 

Chloride Level 115, Carbon Dioxide Level 21, Anion Gap 9, Blood Urea Nitrogen 

13, Creatinine 0.76, Estimat Glomerular Filtration Rate 76, BUN/Creatinine Ratio

17, Glucose Level 114, Calcium Level 8.6, Corrected Calcium 10.0, Total 

Bilirubin 0.3, Aspartate Amino Transf (AST/SGOT) 12, Alanine Aminotransferase 

(ALT/SGPT) 28, Alkaline Phosphatase 122, Total Protein 5.0, Albumin 2.3








Discharge


Home Medications:





Active Scripts


Active


Pepcid (Famotidine) 20 Mg Tablet 20 Mg PEG BID


Effer-K 20 Meq Tablet Eff (Potassium Bicarbonate/Cit AC) 20 Meq Tablet.eff 20 

Meq PEG DAILY@0700


Reported


Ocusoft Lid Scrub (Eyelid Cleanser Combination #5) 1 Each Med..pad 1 Each TP 

DAILY


Vitamin C (Ascorbic Acid) 1,000 Mg Tablet 1,000 Mg PO DAILY


Allopurinol 100 Mg Tablet 100 Mg PO DAILY


Diclofenac Sodium 1 % Gel..gram. 2 Gm TOP QID PRN


     APPLY TO AFFECTED AREA


Lactulose 10 Gram/15 Ml Solution 10 Gm PO BID


Doxazosin Mesylate 1 Mg Tablet 1 Mg PO DAILY


Refresh Tears (Carboxymethylcellulose Sodium) 0.5 % Drops 1 Drop OU QID


Quetiapine Fumarate 25 Mg Tablet 12.5 Mg PO HS


     TAKES  OF A 25MG TAB


Aspirin 81 Mg Tab.chew 81 Mg PO HS


Amlodipine Besylate 5 Mg Tablet 5 Mg PO DAILY


     HOLD FOR SBP <100 OR PULSE LESS THAN 60


Acetaminophen 325 Mg/10.15 Ml Soln 20.3 Ml PO Q6H PRN


Acetaminophen 500 Mg Tablet 1,000 Mg PO TID


Vitamin D3 (Cholecalciferol (Vitamin D3)) 125 Mcg (5000 Unit) Capsule 125 Mcg PO

DAILY


Multivitamin 1 Each Tablet 1 Each PO DAILY


Polytrim Eye Drops (Polymyxin B Sulf/Trimethoprim) 10,000 Unit-1 Mg/Ml Drops 1 

Drop OU TID


Senna Plus Tablet (Sennosides/Docusate Sodium) 8.6 Mg-50 Mg Tablet 1 Each PO 

Q12H


Miralax (Polyethylene Glycol 3350) 17 Gram Powd.pack 17 Gm PO HS


Alendronate Sodium 70 Mg Tablet 70 Mg PO FRI





Instructions to patient/family


Please see electronic discharge instructions given to patient.





Clinical Quality Measures


DVT/VTE Risk/Contraindication:


Contraindications-Pharm:  Other *list below*


Other:  


gi bleed











JASON RODRIGUEZ DO                 Nov 7, 2022 09:32

## 2022-11-07 NOTE — SPEECH THERAPY DAILY NOTE
Speech Daily Progress Note


Subjective


Date Seen by Provider:  Nov 7, 2022


Time Seen by Provider:  08:30


The patient was lying in bed, eyes closed, upon entrance to her room by the 

clinician. The patient woke with moderate clinician verbal prompting. The 

patient moans and intermittently mumbles, however, most verbalizations are not 

intelligible. The patient was positioned upright in bed for safe swallowing.





Objective


Moisture was applied to the patient's lips, lingual surface, and oral cavity for

increased comfort. The patient does attempt manipulation of the swab on this 

date by displaying mastication to draw additional liquid into the oral cavity. 

Following, the patient was provided one ice chip and one half teaspoon of water.

The patient minimally manipulates the ice chip with phasic mastication. Improved

timeliness of the pharyngeal swallow was achieved with the ice chip and the thin

liquid half teaspoon bolus. The patient displays an immediate wet vocal quality 

and wheezing, increased respirations. A delayed, rigorous cough was displayed. 

The patient continues to return to sleep, turning her head away from the 

clinician. 





The clinician ensured the patient's respiratory status returned to baseline 

prior to close of the treatment session. The patient does appear to be 

displaying regression and increased fatigue, however, does remain participatory 

with the clinician. At this time, the patient is not deemed safe or appropriate 

for continued treatment. The patient does politely defer additional boluses 

offered by the clinician. The patient points to the chair and requests the 

clinician remain with her. The treatment session is ended, however, the 

clinician remains at bedside to provide additional comfort to the patient as 

requested.





Continue plan of care. 





Recommendations:


- The patient should remain N.P.O. with total nutrition, hydration, and 

medication via PEG.


- Frequent and excellent oral care to reduce the transfer of oral bacteria to 

the lungs should aspiration of secretions occur.


- Speech pathology to re-assess the oropharyngeal swallowing function, as 

appropriate.





At this time, the speech pathologist suspects a poor prognosis for the 

oropharyngeal swallowing function. If the patient transfers to her facility on 

this date, the clinician recommends continued treatment and evaluation of the 

oropharyngeal swallowing function. If increased alertness, participation, and 

appropriateness are displayed, a modified barium swallow is recommended prior to

a return to a P.O. diet consistency due to the patient's multiple risk factors 

and the patient's son's report of coughing with thin liquids throughout his time

with her at the facility.





Assessment


Assessment Current Status:  Regressing





Treatment Plan


Continue Plan of Care





Speech Short Term Goals


Short Term Goals


Short Term Goals


1. The patient will tolerate trials of the least restrictive consistency without

s/s of suspected aspiration.


Time Frame-STG:  Two Days.





Speech Long Term Goals


Long Term Goals


1. The patient will demonstrate tolerance of the least restrictive diet consist

ency without s/s of suspected aspiration.


Time Frame:  Six Weeks.





Speech-Plan


Treatment Plan


Speech Therapy Treatment Plan:  Continue Plan of Care


Treatment Duration:  Nov 4, 2022


Frequency:  2 times per week


Estimated Hrs Per Day:  .25 hour per day


Rehab Potential:  Poor





Safety Risks/Education


Teaching Recipient:  Patient


Teaching Methods:  Discussion


Response to Teaching:  Unable to Comprehend


Education Topics Provided:  


Results, Recommendations





Time


Speech Therapy Time In:  08:30


Speech Therapy Time Out:  08:45


DATE:  Nov 7, 2022


Total Billed Time:  15


Billed Treatment Time


1MARIA A [address]                Nov 7, 2022 09:08

## 2022-11-10 NOTE — DIAGNOSTIC IMAGING REPORT
INDICATION: Left shoulder fracture follow-up.



EXAMINATION: 4 views of the left shoulder were obtained.



COMPARISON: 07/21/2022.



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

proximal shaft are noted, with similar appearance to the previous

study. The glenohumeral joint and AC joint appear in good

alignment.



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

as described above. 



Dictated by: 



  Dictated on workstation # [id number]

## 2022-11-18 NOTE — DIAGNOSTIC IMAGING REPORT
Indication: Dysphagia.



The procedure was performed in conjunction with speech pathology.

Video fluoroscopy was performed during the swallowing of barium

in multiple consistencies. A total of 2.0 minutes of fluoroscopic

time was utilized.



The patient ingested thin barium as well as applesauce and banana

consistencies. There was some flash penetration and silent

aspiration during the swallowing of thin barium. All other

consistencies consistencies were unremarkable. Overall study is

somewhat compromised due to marked patient kyphotic curvature.



IMPRESSION: Penetration and silent aspiration during the

swallowing of thin barium. No other significant abnormality was

detected.



Dictated by: 



  Dictated on workstation # [id number]

## 2022-12-04 NOTE — ED GI
General


Chief Complaint:  Catheter/Drain/Tube Problems


Stated Complaint:  FEEDING TUBE CHANGE


Nursing Triage Note:  


PT TO ED PER W/C W/ C/O "BLOCKED" FEEDING TUBE.  STAFF W/ PT REPORT PT HAD 0400 


FEEDING, 0830 MEDS BUT SINCE TUBE HAS NOT WORKED.  STAFF ALSO REPORTS THEY 


ATTEMPTED TO PUSH "COKE" THROUGH IT W/O SUCCESS.  NO DISTRESS OR DISCOMFORT 


NOTED AT THIS TIME.


Source of Information:  Caregiver


Exam Limitations:  No Limitations





History of Present Illness


Date Seen by Provider:  Dec 4, 2022


Time Seen by Provider:  10:30


Initial Comments


87-year-old female G-tube dependent feedings presents for malfunction of the G-

tube.  He was placed in May and has been functioning well since that time, ch

anged multiple times by surgery about once a month.  Was able to be used for 

4a.m. medication administration but would not function to give her 10:00 

feeding.  She is in her usual state of health according to her caregivers.  

Patient is nonverbal and history is obtained from 2 caregivers at bedside





Allergies and Home Medications


Allergies


Coded Allergies:  


     melatonin (Verified  Allergy, Unknown, 5/22/22)


     quetiapine (Verified  Adverse Reaction, Unknown, CONFUSION, 11/4/22)


   confusion





Patient Home Medication List


Home Medication List Reviewed:  Yes


Acetaminophen (Acetaminophen) 500 Mg Tablet, 1,000 MG PO TID, (Reported)


   Entered as Reported by: MEE ROMAN on 11/1/22 0939


Acetaminophen (Acetaminophen) 325 Mg/10.15 Ml Soln, 20.3 ML PO Q6H PRN for PAIN-

MILD (1-4), (Reported)


   Entered as Reported by: MEE ROMAN on 11/1/22 0939


Alendronate Sodium (Alendronate Sodium) 70 Mg Tablet, 70 MG PO FRI, (Reported)


   Entered as Reported by: MEE ROMAN on 5/24/22 0851


Allopurinol (Allopurinol) 100 Mg Tablet, 100 MG PO DAILY, (Reported)


   Entered as Reported by: MEE ROMAN on 11/1/22 0939


Amlodipine Besylate (Amlodipine Besylate) 5 Mg Tablet, 5 MG PO DAILY, (Reported)


   Entered as Reported by: MEE ROMAN on 11/1/22 0939


Ascorbic Acid (Vitamin C) 1,000 Mg Tablet, 1,000 MG PO DAILY, (Reported)


   Entered as Reported by: MEE ROMAN on 11/1/22 0939


Aspirin (Aspirin) 81 Mg Tab.chew, 81 MG PO HS, (Reported)


   Entered as Reported by: MEE ROMAN on 11/1/22 0939


Carboxymethylcellulose Sodium (Refresh Tears) 0.5 % Drops, 1 DROP OU QID, 

(Reported)


   Entered as Reported by: MEE ROMAN on 11/1/22 0939


Cholecalciferol (Vitamin D3) (Vitamin D3) 125 Mcg (5000 Unit) Capsule, 125 MCG 

PO DAILY, (Reported)


   Entered as Reported by: MEE ROMAN on 5/24/22 0851


Diclofenac Sodium (Diclofenac Sodium) 1 % Gel..gram., 2 GM TOP QID PRN for PAIN-

BREAKTHROUGH, (Reported)


   Entered as Reported by: MEE ROMAN on 11/1/22 0939


Doxazosin Mesylate (Doxazosin Mesylate) 1 Mg Tablet, 1 MG PO DAILY, (Reported)


   Entered as Reported by: MEE ROMAN on 11/1/22 0939


Eyelid Cleanser Combination #5 (Ocusoft Lid Scrub) 1 Each Med..pad, 1 EACH TP 

DAILY, (Reported)


   Entered as Reported by: MEE ROMAN on 11/1/22 0939


Famotidine (Pepcid) 20 Mg Tablet, 20 MG PEG BID


   Prescribed by: JASON RODRIGUEZ on 11/7/22 0932


Lactulose (Lactulose) 10 Gram/15 Ml Solution, 10 GM PO BID, (Reported)


   Entered as Reported by: MEE ROMAN on 11/1/22 0939


Multivitamin (Multivitamin) 1 Each Tablet, 1 EACH PO DAILY, (Reported)


   Entered as Reported by: MEE ROMAN on 5/24/22 0851


Polyethylene Glycol 3350 (Miralax) 17 Gram Powd.pack, 17 GM PO HS, (Reported)


   Entered as Reported by: MEE ROMAN on 5/24/22 0851


Polymyxin B Sulf/Trimethoprim (Polytrim Eye Drops) 10,000 Unit-1 Mg/Ml Drops, 1 

DROP OU TID, (Reported)


   Entered as Reported by: MEE ROMAN on 5/24/22 0851


Potassium Bicarbonate/Cit AC (Effer-K 20 Meq Tablet Eff) 20 Meq Tablet.eff, 20 

MEQ PEG DAILY@0700


   Prescribed by: JASON RODRIGUEZ on 11/7/22 0932


Quetiapine Fumarate (Quetiapine Fumarate) 25 Mg Tablet, 12.5 MG PO HS, 

(Reported)


   Entered as Reported by: MEE ROMAN on 11/1/22 0939


Sennosides/Docusate Sodium (Senna Plus Tablet) 8.6 Mg-50 Mg Tablet, 1 EACH PO 

Q12H, (Reported)


   Entered as Reported by: MEE ROMAN on 5/24/22 0851





Review of Systems


Review of Systems


Constitutional:  no symptoms reported


EENTM:  No Symptoms Reported


Respiratory:  No Symptoms Reported


Cardiovascular:  No Symptoms Reported


Gastrointestinal:  Other (G-tube not functioning properly)


Genitourinary:  No Symptoms Reported


Musculoskeletal:  no symptoms reported


Skin:  no symptoms reported


Psychiatric/Neurological:  No Symptoms Reported


Endocrine:  No Symptoms Reported


Hematologic/Lymphatic:  No Symptoms Reported





Past Medical-Social-Family Hx


Patient Social History


Tobacco Use?:  No


Use of E-Cig and/or Vaping dev:  No


Substance use?:  No


Alcohol Use?:  No


Pt feels they are or have been:  No





Immunizations Up To Date


Tetanus Booster (TDap):  Unknown


PED Vaccines UTD:  No


First/Initial COVID19 Vaccinat:  YES


Second COVID19 Vaccination Brayden:  YES


Third COVID19 Vaccination Date:  YES





Seasonal Allergies


Seasonal Allergies:  No





Past Medical History


Surgery/Hospitalization HX:  


DEMENTIA, A-FIB,CAD, HTN, HYPOTHYROIDISM, HYPERLIPIDEMIA





G TUBE


Surgeries:  Yes


Abdominal


Respiratory:  No


Currently Using CPAP:  No


Currently Using BIPAP:  No


Cardiac:  Yes (SEES DR. BUSBY UNSURE WHY)


Atrial Fibrillation, Hypertension


Neurological:  Yes


Dementia


Reproductive Disorders:  No


Female Reproductive Disorders:  Denies


Sexually Transmitted Disease:  No


HIV/AIDS:  No


Genitourinary:  Yes


UTI-Chronic


Gastrointestinal:  No


Gastroesophageal Reflux


Musculoskeletal:  Yes


Fractures, Gout


Endocrine:  Yes


Hypothyroidsim


HEENT:  Yes


Cataract


Loss of Vision:  Denies


Hearing Impairment:  Hard of Hearing


Cancer:  No


Psychosocial:  No


Depression


Integumentary:  No


Recent Skin Changes


Blood Disorders:  No


Adverse Reaction/Blood Tranf:  No





Family Medical History


Reviewed Nursing Family Hx





Dementia


  G8 SISTER


Diabetes mellitus


  19 FATHER


No Pertinent Family Hx





Physical Exam


Vital Signs





Vital Signs - First Documented








 12/4/22





 10:18


 


Temp 35.7


 


Pulse 72


 


Resp 18


 


B/P (MAP) 135/71 (92)


 


Pulse Ox 97


 


O2 Delivery Room Air





Capillary Refill : Less Than 3 Seconds


Height/Weight/BMI


Height: 5'4.00"


Weight: 117lbs. 8.0oz. 53.230991pb; 16.00 BMI


Method:Stated


General Appearance:  WD/WN, no apparent distress


HEENT:  normal ENT inspection, pharynx normal


Neck:  non-tender, normal inspection


Respiratory:  chest non-tender, normal breath sounds, no respiratory distress


Cardiovascular:  regular rate, rhythm, no murmur


Gastrointestinal:  normal bowel sounds, non tender, soft, other (G-tube left mid

abdomen)


Extremities:  normal inspection, normal capillary refill


Skin:  normal color, warm/dry





Procedures/Interventions





   Suture Size:  5-0





Progress/Results/Core Measures


Results/Orders


My Orders





Orders - JULIETA WALTER DO


Amlodipine Tablet (Norvasc Tablet) (12/4/22 10:45)





Vital Signs/I&O











 12/4/22





 10:18


 


Temp 35.7


 


Pulse 72


 


Resp 18


 


B/P (MAP) 135/71 (92)


 


Pulse Ox 97


 


O2 Delivery Room Air














Blood Pressure Mean:                    92











Departure


Communication (Admissions)


I believe that the G-tube is suctioning to the side of the stomach creating a 

vacuum in the tube as when I tried to draw back it would collapse the tubing.  I

flushed 5 cc of normal saline and was ultimately able to aspirate this and 

stomach acid as well.  Patient has no abdominal pain.  This may have been 

clogged however may just be suctioning up against the stomach intermittently.  I

gave the caregivers at bedside some tips on flushing and use.  Notably the 

balloon for the G-tube only had 5 cc of fluid, I added 3 more cc with the 

manufacture recommendation to 7 to 10 cc in hopes of helping with the problem.  

We were able to use her to administer amlodipine dose.  Advised to give her 

feeding when they returned to the nursing facility.  They state understanding.  

They are comfortable and agreeable to the current plan of care.  They will 

follow-up with her general surgeon for the regularly scheduled changes.





Impression





   Primary Impression:  


   Malfunction of gastrostomy tube


Disposition:  01 HOME, SELF-CARE


Condition:  Stable





Departure-Patient Inst.


Referrals:  


NICOLASA MACHADO DO (PCP/Family)


Primary Care Physician


Patient Instructions:  How to Care for Your Gastrostomy Tube











JULIETA WALTER DO             Dec 4, 2022 10:48

## 2023-01-10 NOTE — DIAGNOSTIC IMAGING REPORT
INDICATION: Left shoulder fracture follow-up.



AP and transscapular views left shoulder are obtained and

compared to 05/03/202.



There is a chronic nonunited fracture of the proximal humerus

with unchanged displacement. There is some ill-defined callus

formation. The humeral joint appears in good alignment. AC joint

appears in good alignment.



IMPRESSION:



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

displacement.



Dictated by: 



  Dictated on workstation # [id number]

## 2023-01-13 NOTE — DIAGNOSTIC IMAGING REPORT
INDICATION: Dysphasia.



Procedure was performed in conjunction with speech pathology.

Video fluoroscopy was performed during swallowing of barium at

multiple consistencies. 125 seconds of fluoroscopic time was

utilized.



Patient ingested thin barium as well as nectar, honey, applesauce

and banana consistency. There was penetration and aspiration

observed during swallowing of thin barium. There was also an

episode of deep laryngeal penetration during swallowing of nectar

consistency. Applesauce and banana consistency was unremarkable.

There is significant vallecular residue noted with all

consistencies. There is a significant delay in the oral phase

with moderate amount of spill.



IMPRESSION: Abnormal modified barium swallow demonstrating

aspiration during the swallowing of thin barium as well as deep

laryngeal penetration with nectar consistency. There is

significant vallecular residue noted with all consistencies.



Dictated by: 



  Dictated on workstation # [id number]

## 2023-02-27 NOTE — DIAGNOSTIC IMAGING REPORT
INDICATION: Dysphagia.



Procedure was performed in conjunction with speech pathology.

Video fluoroscopy was performed during swallowing of barium in

multiple consistencies. A total of 145 seconds of fluoroscopic

time was utilized.



Patient ingested thin barium as well as nectar, applesauce and

banana consistency. There is early spillover with all

consistencies. There was penetration with aspiration during

swallowing of thin barium. No penetration or aspiration was

observed with any other consistency. There is significant

vallecular residue with the banana consistency however this did

clear with multiple swallows.



IMPRESSION: Modified barium swallow, as described with aspiration

during swallowing of thin barium. There is also significant

vallecular residue with mechanical soft consistency which did

clear with repeat swallows.



Dictated by: 



  Dictated on workstation # [id number]

## 2023-03-25 NOTE — ED FALL/INJURY
General


Chief Complaint:  Trauma-Non Activation


Stated Complaint:  FALL |


Nursing Triage Note:  


PT TO ED BY EMS WITH AIDE WITH C/O FALL APPROX 30 MIN PTA. AIDE REPORTS SHE WAS 


HELPING PATIENT UP FROM CHAIR TO GO TO RESTROOM, PATIENT TRIPPED ON FOOT REST, 


FELL FORWARD AND HIT HEAD ON FLOOR. NO LOC. PT REPORTS PAIN IN FACE, HEAD, AND R




HIP. LAC NOTED TO FOREHEAD. AIDE REPORTS PT ALREADY HAD A BROKEN L ARM AND WEARS




A LIDOCAINE PATCH.


Source:  patient, EMS, caregiver


Exam Limitations:  no limitations





History of Present Illness


Date Seen by Provider:  Mar 25, 2023


Time Seen by Provider:  18:08


Initial Comments


88yoF with PMH most notable for dementia coming in via EMS from home after a 

witnessed fall tripping and landing on her face. Did not pass out and remembers 

all events. Believes her last tetanus shot was within the past 10 years. Denies 

any neck or back pain. Had standed since the incident. Denies any chest pain, 

SOB, abd pain, n/v/d, weakness, numbness, or any other concerns. Does not take 

any blood thinners.


EMS endorses normal vitals and a 2cm laceration to her forehead.





Allergies and Home Medications


Allergies


Coded Allergies:  


     melatonin (Verified  Allergy, Unknown, 22)


     quetiapine (Verified  Adverse Reaction, Unknown, CONFUSION, 22)


   confusion





Patient Home Medication List


Home Medication List Reviewed:  Yes


Acetaminophen (Acetaminophen) 500 Mg Tablet, 1,000 MG PO TID, (Reported)


   Entered as Reported by: MEE ROMAN on 22 09


Acetaminophen (Acetaminophen) 325 Mg/10.15 Ml Soln, 20.3 ML PO Q6H PRN for PAIN-

MILD (1-4), (Reported)


   Entered as Reported by: MEE ROMAN on 22 09


Alendronate Sodium (Alendronate Sodium) 70 Mg Tablet, 70 MG PO FRI, (Reported)


   Entered as Reported by: MEE ROMAN on 22 0851


Allopurinol (Allopurinol) 100 Mg Tablet, 100 MG PO DAILY, (Reported)


   Entered as Reported by: MEE ROMAN on 22 09


Amlodipine Besylate (Amlodipine Besylate) 5 Mg Tablet, 5 MG PO DAILY, (Reported)


   Entered as Reported by: MEE ROMAN on 22


Ascorbic Acid (Vitamin C) 1,000 Mg Tablet, 1,000 MG PO DAILY, (Reported)


   Entered as Reported by: MEE ROMAN on 22


Aspirin (Aspirin) 81 Mg Tab.chew, 81 MG PO HS, (Reported)


   Entered as Reported by: MEE ROMAN on 22


Carboxymethylcellulose Sodium (Refresh Tears) 0.5 % Drops, 1 DROP OU QID, 

(Reported)


   Entered as Reported by: MEE ROMAN on 22


Cholecalciferol (Vitamin D3) (Vitamin D3) 125 Mcg (5000 Unit) Capsule, 125 MCG 

PO DAILY, (Reported)


   Entered as Reported by: MEE ROMAN on 22


Diclofenac Sodium (Diclofenac Sodium) 1 % Gel..gram., 2 GM TOP QID PRN for PAIN-

BREAKTHROUGH, (Reported)


   Entered as Reported by: MEE ROMAN on 22


Doxazosin Mesylate (Doxazosin Mesylate) 1 Mg Tablet, 1 MG PO DAILY, (Reported)


   Entered as Reported by: MEE ROMAN on 22


Eyelid Cleanser Combination #5 (Ocusoft Lid Scrub) 1 Each Med..pad, 1 EACH TP 

DAILY, (Reported)


   Entered as Reported by: MEE ROMAN on 22


Famotidine (Pepcid) 20 Mg Tablet, 20 MG PEG BID


   Prescribed by: JASON RODRIGUEZ on 22


Lactulose (Lactulose) 10 Gram/15 Ml Solution, 10 GM PO BID, (Reported)


   Entered as Reported by: MEE ROMAN on 22


Multivitamin (Multivitamin) 1 Each Tablet, 1 EACH PO DAILY, (Reported)


   Entered as Reported by: MEE ROMAN on 22


Polyethylene Glycol 3350 (Miralax) 17 Gram Powd.pack, 17 GM PO HS, (Reported)


   Entered as Reported by: MEE ROMAN on 22


Polymyxin B Sulf/Trimethoprim (Polytrim Eye Drops) 10,000 Unit-1 Mg/Ml Drops, 1 

DROP OU TID, (Reported)


   Entered as Reported by: MEE ROMAN on 22 0851


Potassium Bicarbonate/Cit AC (Effer-K 20 Meq Tablet Eff) 20 Meq Tablet.eff, 20 

MEQ PEG DAILY@0700


   Prescribed by: JASON ORDRIGUEZ on 22 0932


Quetiapine Fumarate (Quetiapine Fumarate) 25 Mg Tablet, 12.5 MG PO HS, 

(Reported)


   Entered as Reported by: MEE ROMAN on 22 0939


Sennosides/Docusate Sodium (Senna Plus Tablet) 8.6 Mg-50 Mg Tablet, 1 EACH PO 

Q12H, (Reported)


   Entered as Reported by: MEE ROMAN on 22 0851





Review of Systems


Review of Systems


Constitutional:  No fever


Eyes:  No Symptoms Reported


Ears, Nose, Mouth, Throat:  no symptoms reported


Respiratory:  no symptoms reported


Cardiovascular:  no symptoms reported


Gastrointestinal:  no symptoms reported


Genitourinary:  no symptoms reported


Musculoskeletal:  see HPI


Skin:  see HPI


Psychiatric/Neurological:  No Symptoms Reported





All Other Systems Reviewed


Negative Unless Noted:  Yes





Past Medical-Social-Family Hx


Patient Social History


Tobacco Use?:  No





Immunizations Up To Date


Tetanus Booster (TDap):  Unknown


PED Vaccines UTD:  No


First/Initial COVID19 Vaccinat:  YES


Second COVID19 Vaccination Brayden:  YES


Third COVID19 Vaccination Date:  YES





Seasonal Allergies


Seasonal Allergies:  No





Past Medical History


Surgery/Hospitalization HX:  


DEMENTIA, A-FIB,CAD, HTN, HYPOTHYROIDISM, HYPERLIPIDEMIA





G TUBE


Surgeries:  Yes


Abdominal


Respiratory:  No


Currently Using CPAP:  No


Currently Using BIPAP:  No


Cardiac:  Yes (SEES DR. BUSBY UNSURE WHY)


Atrial Fibrillation, Hypertension


Neurological:  Yes


Dementia


Reproductive Disorders:  No


Female Reproductive Disorders:  Denies


Sexually Transmitted Disease:  No


HIV/AIDS:  No


Genitourinary:  Yes


UTI-Chronic


Gastrointestinal:  No


Gastroesophageal Reflux


Musculoskeletal:  Yes


Fractures, Gout


Endocrine:  Yes


Hypothyroidsim


HEENT:  Yes


Cataract


Loss of Vision:  Denies


Hearing Impairment:  Hard of Hearing


Cancer:  No


Psychosocial:  No


Depression


Integumentary:  No


Recent Skin Changes


Blood Disorders:  No


Adverse Reaction/Blood Tranf:  No





Family Medical History





Dementia


  G8 SISTER


Diabetes mellitus


  19 FATHER


No Pertinent Family Hx





Physical Exam


Vital Signs





Vital Signs - First Documented








 3/25/23





 18:09


 


Temp 36.2


 


Pulse 94


 


Resp 14


 


B/P (MAP) 179/78 (111)


 


Pulse Ox 97


 


O2 Delivery Room Air





Capillary Refill : Less Than 3 Seconds


Height, Weight, BMI


Height: 5'4.00"


Weight: 117lbs. 8.0oz. 53.061202hs; 16.00 BMI


Method:Stated


General Appearance:  WD/WN, no apparent distress


HEENT:  PERRL/EOMI, normal ENT inspection, pharynx normal, other (2cm laceration

superfical above right eyebrow)


Neck:  non-tender, full range of motion, supple, normal inspection


Cardiovascular:  regular rate, rhythm, no edema, no murmur


Respiratory:  chest non-tender, lungs clear, normal breath sounds, no 

respiratory distress, no accessory muscle use


Gastrointestinal:  normal bowel sounds, non tender, soft; No distended, No 

guarding, No rebound


Back:  normal inspection, no CVA tenderness, no vertebral tenderness


Extremities:  normal range of motion, non-tender, normal inspection, no pedal 

edema, no calf tenderness, normal capillary refill


Neurologic/Psychiatric:  no motor/sensory deficits, alert, normal mood/affect, 

other (oriented to self)


Skin:  normal color, warm/dry





[place] Coma Score


Best Eye Response:  (4) Open Spontaneously


Best Verbal Response:  (5) Oriented


Best Motor Response:  (6) Obeys Commands





Procedures/Interventions





   Wound Location:  Face


Other Wound Location


forehead


   Wound Length (cm):  2


   Wound's Depth, Shape:  superficial


   Wound Explored:  clean


   Irrigated w/ Saline (ccs):  300


   Suture Size:  5-0


   Other Closure Supply:  Steri Strip 1/4", Mastisol, Wound Adhesive


Progress


Wound cleaned with sterile saline and skin around it with chlorhexidine. Wound 

was already well opposed so tissue adhesive, Mastisol, and Steri-Strips used 

with good result. Patient tolerated this well.





Progress/Results/Core Measures


Results/Orders


My Orders





Orders - URIEL NAVARRO MD


Ct Head/Face/Cervical Wo (3/25/23 18:44)


Pelvis With Right Hip 2-3views (3/25/23 18:56)


Hydrocodone/Apap 5/325 Tablet (Lortab 5 (3/25/23 19:30)





Medications Given in ED





Current Medications








 Medications  Dose


 Ordered  Sig/Familia


 Route  Start Time


 Stop Time Status Last Admin


Dose Admin


 


 Acetaminophen/


 Hydrocodone Bitart  1 ea  ONCE  ONCE


 PO  3/25/23 19:30


 3/25/23 19:31 DC 3/25/23 19:34


1 EA








Vital Signs/I&O











 3/25/23





 18:09


 


Temp 36.2


 


Pulse 94


 


Resp 14


 


B/P (MAP) 179/78 (111)


 


Pulse Ox 97


 


O2 Delivery Room Air














Blood Pressure Mean:                    111











Progress


Progress Note :  


Progress Note


88-year-old female presenting after mechanical fall.  ABCs were intact and 

vitals were stable on presentation.  Physical exam with 2 cm superficial 

laceration to her forehead.  This was cleaned and closed with glue and Steri-

Strips.  Tetanus is already up-to-date.  CT head, face, cervical spine ordered 

and interpreted by me showing no obvious intracranial hemorrhage or 

fracture/dislocation.  This is pending radiology review.  X-ray of the pelvis 

and right hip also ordered later when she later on was having pain that 

initially was not there.  This was also negative for any acute fracture.  Given 

hydrocodone through her PEG tube for pain control.  I believe she is otherwise 

stable for discharge with outpatient follow-up.  She has  care at home.  She

was sent home with strict return precautions





Diagnostic Imaging





   Diagonstic Imaging:  Xray (pelvis and right hip), CT (head, face, c spine)


Comments


NAME:   DARRION GARCIA


[facility] REC#:   [id number]


ACCOUNT#:   [id number]


PT STATUS:   REG ER


:   1935


PHYSICIAN:   URIEL NAVARRO MD


ADMIT DATE:   23/ER


                                   ***Draft***


Date of Exam:23





PELVIS WITH RIGHT HIP 2-3VIEWS








EXAMINATION: Pelvis and right hip radiographs.





EXAM DATE: 3/25/2023 7:10 PM.





COMPARISON: None available.





HISTORY: Pelvic, hip pain.





TECHNIQUE: 3 views.





FINDINGS: There is no acute fracture, dislocation or destructive


osseous process. The joint spaces are normal. The soft tissues


are normal. A stable irregularity of the bilateral inferior pubic


rami and superior left pubic ramus, compared to 2021.





IMPRESSION: No acute osseous abnormality. 





  Dictated on workstation # [id number]








Dict:   23


Trans:   23


E [id number]





Interpreted by:     DALE,SANCHEZ C DO


Electronically signed by:  





NAME:   DARRION GARCIA


[facility] REC#:   [id number]


ACCOUNT#:   [id number]


PT STATUS:   REG ER


:   1935


PHYSICIAN:   URIEL NAVARRO MD


ADMIT DATE:   23/ER


                                   ***Draft***


Date of Exam:23





CT HEAD/FACE/CERVICAL WO








EXAMINATION: CT head, face and CT cervical spine without


contrast.





TECHNIQUE: Multiple contiguous axial images were obtained through


the face, brain and cervical spine without the use of intravenous


contrast. Sagittal and coronal reformations through the cervical


spine were then performed. All CT scans use one or more of the


following dose optimizing techniques: automated exposure control,


MA and/or KvP adjustment based on patient size and exam type or


iterative reconstruction. 





HISTORY: Head, face and neck pain after injury.





COMPARISON: 2022.





FINDINGS: 





CT HEAD: Mild diffuse cerebral volume loss with proportional


enlargement of the ventricles and sulci. Moderate hypodensities


throughout the supratentorial white matter posterior hemispheres.


Chronic lacunar infarcts of the left basal ganglia. No acute


intracranial hemorrhage or abnormal extra-axial fluid collection


is present. 





Calcification of the intracranial ICAs. No hyperdense vessel.





The calvarium is intact. Right frontal scalp swelling and


laceration. The mastoid air cells are clear. The visualized


paranasal sinuses are clear. The orbits are normal.





CT C-SPINE: Vertebral body height and alignment are preserved. No


acute fracture, dislocation or destructive osseous process. There


is multilevel facet hypertrophy without perched facets. 





There is multilevel cervical spondylosis.





The paraspinous soft tissues are normal. The visualized thyroid


gland is normal. The visualized lung apices are normal.





CT FACE: No fracture is seen in the face. The nasal bones are


normal. Mandible and maxillae are normal. Zygomatic arches are


normal. Pterygoid plates are normal. No soft tissue abnormality


is seen.





IMPRESSION:


1. No acute intracranial abnormality. Chronic microangiopathy and


volume loss.


2. Degenerative changes of the cervical spine without acute


osseous abnormality.


3. No acute fracture is seen within the face.


4. Right frontal scalp swelling and laceration without underlying


calvarial fracture. 





  Dictated on workstation # [id number]








Dict:   23


Trans:   23


[facility] [id number]





Interpreted by:     SANCHEZ HUNTER DO


Electronically signed by:





Departure


Impression





   Primary Impression:  


   Fall


   Qualified Codes:  W19.XXXA - Unspecified fall, initial encounter


   Additional Impression:  


   Forehead laceration


   Qualified Codes:  S01.81XA - Laceration without foreign body of other part of

   head, initial encounter


Disposition:   HOME, SELF-CARE


Condition:  Stable





Departure-Patient Inst.


Decision time for Depature:  20:00


Referrals:  


MEE HERNANDEZ DO (PCP)


Primary Care Physician








[facility] (Family)


Primary Care Physician


Patient Instructions:  Laceration Repair With Glue ED





Add. Discharge Instructions:  


Try not to get the wound on her forehead wet for at least 24 hours, water can 

briefly run over it after that for the next week, but do not submerge in water. 

Give it roughly 7 to 10 days before your allowing it to submerge in water at 

all, depending on how well the wound is scabbed over and healed.  Once the 

Steri-Strips start peeling off, you can slowly take them off. 


Take Tylenol as needed for pain.











URIEL NAVARRO MD          Mar 25, 2023 18:51

## 2023-03-25 NOTE — DIAGNOSTIC IMAGING REPORT
EXAMINATION: Pelvis and right hip radiographs.



EXAM DATE: 3/25/2023 7:10 PM.



COMPARISON: None available.



HISTORY: Pelvic, hip pain.



TECHNIQUE: 3 views.



FINDINGS: There is no acute fracture, dislocation or destructive

osseous process. The joint spaces are normal. The soft tissues

are normal. A stable irregularity of the bilateral inferior pubic

rami and superior left pubic ramus, compared to 04/11/2021.



IMPRESSION: No acute osseous abnormality. 



Dictated by: 



  Dictated on workstation # [id number]

## 2023-03-25 NOTE — DIAGNOSTIC IMAGING REPORT
EXAMINATION: CT head, face and CT cervical spine without

contrast.



TECHNIQUE: Multiple contiguous axial images were obtained through

the face, brain and cervical spine without the use of intravenous

contrast. Sagittal and coronal reformations through the cervical

spine were then performed. All CT scans use one or more of the

following dose optimizing techniques: automated exposure control,

MA and/or KvP adjustment based on patient size and exam type or

iterative reconstruction. 



HISTORY: Head, face and neck pain after injury.



COMPARISON: 07/21/2022.



FINDINGS: 



CT HEAD: Mild diffuse cerebral volume loss with proportional

enlargement of the ventricles and sulci. Moderate hypodensities

throughout the supratentorial white matter posterior hemispheres.

Chronic lacunar infarcts of the left basal ganglia. No acute

intracranial hemorrhage or abnormal extra-axial fluid collection

is present. 



Calcification of the intracranial ICAs. No hyperdense vessel.



The calvarium is intact. Right frontal scalp swelling and

laceration. The mastoid air cells are clear. The visualized

paranasal sinuses are clear. The orbits are normal.



CT C-SPINE: Vertebral body height and alignment are preserved. No

acute fracture, dislocation or destructive osseous process. There

is multilevel facet hypertrophy without perched facets. 



There is multilevel cervical spondylosis.



The paraspinous soft tissues are normal. The visualized thyroid

gland is normal. The visualized lung apices are normal.



CT FACE: No fracture is seen in the face. The nasal bones are

normal. Mandible and maxillae are normal. Zygomatic arches are

normal. Pterygoid plates are normal. No soft tissue abnormality

is seen.



IMPRESSION:

1. No acute intracranial abnormality. Chronic microangiopathy and

volume loss.

2. Degenerative changes of the cervical spine without acute

osseous abnormality.

3. No acute fracture is seen within the face.

4. Right frontal scalp swelling and laceration without underlying

calvarial fracture. 



Dictated by: 



  Dictated on workstation # [id number]

## 2023-03-25 NOTE — ED TRAUMA-MULTISYSTEM
General


Chief Complaint:  Trauma-Non Activation


Stated Complaint:  FALL |


Nursing Triage Note:  


PT TO ED BY EMS WITH AIDE WITH C/O FALL APPROX 30 MIN PTA. AIDE REPORTS SHE WAS 


HELPING PATIENT UP FROM CHAIR TO GO TO RESTROOM, PATIENT TRIPPED ON FOOT REST, 


FELL FORWARD AND HIT HEAD ON FLOOR. NO LOC. PT REPORTS PAIN IN FACE, HEAD, AND R




HIP. LAC NOTED TO FOREHEAD. AIDE REPORTS PT ALREADY HAD A BROKEN L ARM AND WEARS




A LIDOCAINE PATCH.


Source of Information:  Patient, Caregiver, Old Records


Exam Limitations:  Physical Impairments (baseline dementia)





History of Present Illness


Date Seen by Provider:  Mar 25, 2023


Time Seen by Provider:  18:31





Allergies and Home Medications


Allergies


Coded Allergies:  


     melatonin (Verified  Allergy, Unknown, 5/22/22)


     quetiapine (Verified  Adverse Reaction, Unknown, CONFUSION, 11/4/22)


   confusion





Patient Home Medication List


Acetaminophen (Acetaminophen) 500 Mg Tablet, 1,000 MG PO TID, (Reported)


   Entered as Reported by: MEE ROMAN on 11/1/22 0939


Acetaminophen (Acetaminophen) 325 Mg/10.15 Ml Soln, 20.3 ML PO Q6H PRN for PAIN-

MILD (1-4), (Reported)


   Entered as Reported by: MEE ROMAN on 11/1/22 0939


Alendronate Sodium (Alendronate Sodium) 70 Mg Tablet, 70 MG PO FRI, (Reported)


   Entered as Reported by: MEE ROMAN on 5/24/22 0851


Allopurinol (Allopurinol) 100 Mg Tablet, 100 MG PO DAILY, (Reported)


   Entered as Reported by: MEE ROMAN on 11/1/22 0939


Amlodipine Besylate (Amlodipine Besylate) 5 Mg Tablet, 5 MG PO DAILY, (Reported)


   Entered as Reported by: MEE ROMAN on 11/1/22 0939


Ascorbic Acid (Vitamin C) 1,000 Mg Tablet, 1,000 MG PO DAILY, (Reported)


   Entered as Reported by: MEE ROMAN on 11/1/22 0939


Aspirin (Aspirin) 81 Mg Tab.chew, 81 MG PO HS, (Reported)


   Entered as Reported by: MEE ROMAN on 11/1/22 0939


Carboxymethylcellulose Sodium (Refresh Tears) 0.5 % Drops, 1 DROP OU QID, 

(Reported)


   Entered as Reported by: MEE ROMAN on 11/1/22 0939


Cholecalciferol (Vitamin D3) (Vitamin D3) 125 Mcg (5000 Unit) Capsule, 125 MCG 

PO DAILY, (Reported)


   Entered as Reported by: MEE ROMAN on 5/24/22 0851


Diclofenac Sodium (Diclofenac Sodium) 1 % Gel..gram., 2 GM TOP QID PRN for PAIN-

BREAKTHROUGH, (Reported)


   Entered as Reported by: MEE ROMAN on 11/1/22 0939


Doxazosin Mesylate (Doxazosin Mesylate) 1 Mg Tablet, 1 MG PO DAILY, (Reported)


   Entered as Reported by: MEE ROMAN on 11/1/22 0939


Eyelid Cleanser Combination #5 (Ocusoft Lid Scrub) 1 Each Med..pad, 1 EACH TP 

DAILY, (Reported)


   Entered as Reported by: MEE ROMAN on 11/1/22 0939


Famotidine (Pepcid) 20 Mg Tablet, 20 MG PEG BID


   Prescribed by: JASON RODRIGUEZ on 11/7/22 0932


Lactulose (Lactulose) 10 Gram/15 Ml Solution, 10 GM PO BID, (Reported)


   Entered as Reported by: MEE ROMAN on 11/1/22 0939


Multivitamin (Multivitamin) 1 Each Tablet, 1 EACH PO DAILY, (Reported)


   Entered as Reported by: MEE ROMAN on 5/24/22 0851


Polyethylene Glycol 3350 (Miralax) 17 Gram Powd.pack, 17 GM PO HS, (Reported)


   Entered as Reported by: MEE ROMAN on 5/24/22 0851


Polymyxin B Sulf/Trimethoprim (Polytrim Eye Drops) 10,000 Unit-1 Mg/Ml Drops, 1 

DROP OU TID, (Reported)


   Entered as Reported by: MEE ROMAN on 5/24/22 0851


Potassium Bicarbonate/Cit AC (Effer-K 20 Meq Tablet Eff) 20 Meq Tablet.eff, 20 

MEQ PEG DAILY@0700


   Prescribed by: JASON RODRIGUEZ on 11/7/22 0932


Quetiapine Fumarate (Quetiapine Fumarate) 25 Mg Tablet, 12.5 MG PO HS, 

(Reported)


   Entered as Reported by: MEE ROMAN on 11/1/22 0939


Sennosides/Docusate Sodium (Senna Plus Tablet) 8.6 Mg-50 Mg Tablet, 1 EACH PO 

Q12H, (Reported)


   Entered as Reported by: MEE ROMAN on 5/24/22 0851





Past Medical-Social-Family Hx


Immunizations Up To Date


Tetanus Booster (TDap):  Unknown


PED Vaccines UTD:  No


First/Initial COVID19 Vaccinat:  YES


Second COVID19 Vaccination Brayden:  YES


Third COVID19 Vaccination Date:  YES





Seasonal Allergies


Seasonal Allergies:  No





Past Medical History


Surgery/Hospitalization HX:  


DEMENTIA, A-FIB,CAD, HTN, HYPOTHYROIDISM, HYPERLIPIDEMIA





G TUBE


Surgeries:  Yes


Abdominal


Respiratory:  No


Currently Using CPAP:  No


Currently Using BIPAP:  No


Cardiac:  Yes (SEES DR. BUSBY UNSURE WHY)


Atrial Fibrillation, Hypertension


Neurological:  Yes


Dementia


Reproductive Disorders:  No


Female Reproductive Disorders:  Denies


Sexually Transmitted Disease:  No


HIV/AIDS:  No


Genitourinary:  Yes


UTI-Chronic


Gastrointestinal:  No


Gastroesophageal Reflux


Musculoskeletal:  Yes


Fractures, Gout


Endocrine:  Yes


Hypothyroidsim


HEENT:  Yes


Cataract


Loss of Vision:  Denies


Hearing Impairment:  Hard of Hearing


Cancer:  No


Psychosocial:  No


Depression


Integumentary:  No


Recent Skin Changes


Blood Disorders:  No


Adverse Reaction/Blood Tranf:  No





Family Medical History





Dementia


  G8 SISTER


Diabetes mellitus


  19 FATHER


No Pertinent Family Hx





Physical Exam


Vital Signs





Vital Signs - First Documented








 3/25/23





 18:09


 


Temp 36.2


 


Pulse 94


 


Resp 14


 


B/P (MAP) 179/78 (111)


 


Pulse Ox 97


 


O2 Delivery Room Air








Height, Weight, BMI


Height: 5'4.00"


Weight: 117lbs. 8.0oz. 53.381762yw; 16.00 BMI


Method:Stated





Procedures/Interventions





   Suture Size:  5-0





Progress/Results/Core Measures


Results/Orders


Vital Signs/I&O











 3/25/23





 18:09


 


Temp 36.2


 


Pulse 94


 


Resp 14


 


B/P (MAP) 179/78 (111)


 


Pulse Ox 97


 


O2 Delivery Room Air














Blood Pressure Mean:                    111











Departure


Departure-Patient Inst.


Referrals:  


MEE HERNANDEZ DO (PCP)


Primary Care Physician








[facility] (Family)


Primary Care Physician











NI KIRKPATRICK               Mar 25, 2023 18:48

## 2023-05-07 NOTE — DIAGNOSTIC IMAGING REPORT
PROCEDURE: CT chest, abdomen, and pelvis without contrast.



TECHNIQUE: Multiple contiguous axial images were obtained through

the chest, abdomen, and pelvis without the use of intravenous

contrast. Auto Exposure Controls were utilized during the CT exam

to meet ALARA standards for radiation dose reduction. 



INDICATION: Shortness of breath. Choked on food. Evaluate for

aspiration.



COMPARISON: Chest radiograph from the same day.



FINDINGS: There is no finding of airspace consolidation or

evidence to suggest an acute pneumonia. There is no suspicious

nodule or mass. There is no pneumothorax. There is no pleural

effusion.



The visualized portion of the trachea demonstrates no evidence of

a filling defect. There is no filling defect within the major

pulmonary bronchi. The esophagus is unremarkable.



There is cardiomegaly. There are coronary calcifications. There

is no trace pericardial fluid.



The liver demonstrates no focal and abnormality. There are

gallstones within the gallbladder without biliary dilatation. The

pancreas is atrophic without focal abnormality. The spleen is

normal in size. There are splenic granulomas. There is no adrenal

mass. The kidneys demonstrate no hydronephrosis or urolithiasis.



There is a small hiatal hernia. A gastrostomy tube is in place.

There is no finding of small or large bowel dilation or bowel

obstruction. The appendix is normal. There are a few diverticula

but no findings of diverticulitis.



Urinary bladder demonstrates mild distention without bladder wall

thickening. The uterus is age-appropriate. There is no pelvic

free fluid. There is no finding of free air, abscess or

adenopathy. The aorta is normal in caliber.



Remote fracture deformities within the pubic rami are noted.

There is no hip dislocation. There is a chronic-appearing

superior endplate wedge compression of T12 with minimal height

loss. There is reidentification of the patient's nonunited left

humerus fracture and remote right rib fractures. No acute or

suspicious osseous abnormality demonstrated.



IMPRESSION:

1. No CT finding to suggest pneumonia. No pulmonary infiltrate or

effusion evident. 

2. No filling defect evident within the trachea or major bronchi.



3. Atherosclerosis and coronary artery disease.

4. Cholelithiasis. There is no biliary dilatation. 

5. No bowel obstruction or diverticulitis.

6. No free fluid, adenopathy or abscess. 

7. Remote fractures of the pelvic rami and reidentification of a

nonunited fracture of the left humerus. Multiple remote

right-sided rib fractures are also noted. 



Dictated by: 



  Dictated on workstation # [id number]

## 2023-05-07 NOTE — ED GENERAL
General


Stated Complaint:  EXCESS MUCUS


Source of Information:  Caregiver, Other ([facility], AND CNA)


Exam Limitations:  Other (PT WITH DEMENTIA AND IS NOT TALKING)





History of Present Illness


Date Seen by Provider:  May 7, 2023


Time Seen by Provider:  19:25


Initial Comments


PT ARRIVES VIA POV FROM HOME, WITH CNA AND [facility]IN[facility]


PT NEEDS WHEELCHAIR ON ARRIVAL, IS WEARING A GAIT BELT, AND NEEDS 2 PERSON FULL 

ASSIST FOR TRANSFER FROM WHEELCHAIR TO BED. 


CAREGIVERS REPORT THAT 30 MINUTES AGO, PT BEGAN "FOAMING AT THE MOUTH" --"THICK 

FOAMY MUCOUS" 


SHE WAS ALSO PATTING HER CHEST


NO DIFFICULTY BREATHING





SYMPTOMS SEEM TO BE BETTER AT THIS TIME. 





PT HAS HISTORY OF ASPIRATION, AND HAS A PERMANENT FEEDING TUBE DUE TO THIS 

PROBLEM


SHE WAS EATING CHICKEN NUGGETS AND FRENCH FRIES--CAREGIVERS DO NOT KNOW IF SHE 

GOT CHOKED OR NOT, BUT THE "FOAMING AT THE MOUTH" BEGAN IMMEDIATELY AFTER SHE 

WAS EATING. 


PT GETS ALL MEDICATIONS THROUGH FEEDING TUBE, AND JEVITY THROUGH FEEDING TUBE. 

BUT CAREGIVERS STATE SHE IS ALLOWED TO EAT REGULAR FOOD, DESPITE HISTORY OF 

ASPIRATION





NO FEVER OR RECENT ILLNESS. 








PT HAS HAD COVID VACCINE X 2, AND FLU VACCINE





IN ADDITION TO ALZHEIMER'S AND ASPIRATION, SHE ALSO HAS ATRIAL FIBRILLATION, 

CAD, HTN, HYPERLIPIDEMIA, HYPOTHYROIDISM AND GOUT. 


SHE IS AT NORMAL BASELINE





PCP: DR. HERNANDEZ AT [facility]





Allergies and Home Medications


Allergies


Coded Allergies:  


     melatonin (Verified  Allergy, Unknown, 5/22/22)


     quetiapine (Verified  Adverse Reaction, Unknown, CONFUSION, 11/4/22)


   confusion





Patient Home Medication List


Home Medication List Reviewed:  Yes


Acetaminophen (Acetaminophen) 500 Mg Tablet, 1,000 MG PO TID, (Reported)


   Entered as Reported by: MEE ROMAN on 11/1/22 0939


Acetaminophen (Acetaminophen) 325 Mg/10.15 Ml Soln, 20.3 ML PO Q6H PRN for PAIN-

MILD (1-4), (Reported)


   Entered as Reported by: MEE ROMAN on 11/1/22 0939


Alendronate Sodium (Alendronate Sodium) 70 Mg Tablet, 70 MG PO FRI, (Reported)


   Entered as Reported by: MEE ROMAN on 5/24/22 0851


Allopurinol (Allopurinol) 100 Mg Tablet, 100 MG PO DAILY, (Reported)


   Entered as Reported by: MEE ROMAN on 11/1/22 0939


Amlodipine Besylate (Amlodipine Besylate) 5 Mg Tablet, 5 MG PO DAILY, (Reported)


   Entered as Reported by: MEE ROMAN on 11/1/22 0939


Ascorbic Acid (Vitamin C) 1,000 Mg Tablet, 1,000 MG PO DAILY, (Reported)


   Entered as Reported by: MEE ROMAN on 11/1/22 0939


Aspirin (Aspirin) 81 Mg Tab.chew, 81 MG PO HS, (Reported)


   Entered as Reported by: MEE ROMAN on 11/1/22 0939


Carboxymethylcellulose Sodium (Refresh Tears) 0.5 % Drops, 1 DROP OU QID, 

(Reported)


   Entered as Reported by: MEE ROMAN on 11/1/22 0939


Cholecalciferol (Vitamin D3) (Vitamin D3) 125 Mcg (5000 Unit) Capsule, 125 MCG 

PO DAILY, (Reported)


   Entered as Reported by: MEE ROMAN on 5/24/22 0851


Diclofenac Sodium (Diclofenac Sodium) 1 % Gel..gram., 2 GM TOP QID PRN for 

PAIN-BREAKTHROUGH, (Reported)


   Entered as Reported by: MEE ROMAN on 11/1/22 0939


Doxazosin Mesylate (Doxazosin Mesylate) 1 Mg Tablet, 1 MG PO DAILY, (Reported)


   Entered as Reported by: MEE ROMAN on 11/1/22 0939


Eyelid Cleanser Combination #5 (Ocusoft Lid Scrub) 1 Each Med..pad, 1 EACH TP 

DAILY, (Reported)


   Entered as Reported by: MEE ROMAN on 11/1/22 0939


Famotidine (Pepcid) 20 Mg Tablet, 20 MG PEG BID


   Prescribed by: JASON RODRIGUEZ on 11/7/22 0932


Lactulose (Lactulose) 10 Gram/15 Ml Solution, 10 GM PO BID, (Reported)


   Entered as Reported by: MEE ROMAN on 11/1/22 0939


Multivitamin (Multivitamin) 1 Each Tablet, 1 EACH PO DAILY, (Reported)


   Entered as Reported by: MEE ROMAN on 5/24/22 0851


Polyethylene Glycol 3350 (Miralax) 17 Gram Powd.pack, 17 GM PO HS, (Reported)


   Entered as Reported by: MEE ROMAN on 5/24/22 0851


Polymyxin B Sulf/Trimethoprim (Polytrim Eye Drops) 10,000 Unit-1 Mg/Ml Drops, 1 

DROP OU TID, (Reported)


   Entered as Reported by: MEE ROMAN on 5/24/22 0851


Potassium Bicarbonate/Cit AC (Effer-K 20 Meq Tablet Eff) 20 Meq Tablet.eff, 20 

MEQ PEG DAILY@0700


   Prescribed by: JASON RODRIGUEZ on 11/7/22 0932


Quetiapine Fumarate (Quetiapine Fumarate) 25 Mg Tablet, 12.5 MG PO HS, 

(Reported)


   Entered as Reported by: MEE ROMAN on 11/1/22 0939


Sennosides/Docusate Sodium (Senna Plus Tablet) 8.6 Mg-50 Mg Tablet, 1 EACH PO 

Q12H, (Reported)


   Entered as Reported by: MEE ROMAN on 5/24/22 0851





Review of Systems


Review of Systems


Constitutional:  no symptoms reported


EENTM:  see HPI


Respiratory:  see HPI


Cardiovascular:  see HPI


Gastrointestinal:  see HPI


Psychiatric/Neurological:  See HPI





Past Medical-Social-Family Hx


Immunizations Up To Date


Tetanus Booster (TDap):  Unknown


PED Vaccines UTD:  No


First/Initial COVID19 Vaccinat:  YES


Second COVID19 Vaccination Brayden:  YES


Third COVID19 Vaccination Date:  YES





Seasonal Allergies


Seasonal Allergies:  No





Past Medical History


Surgery/Hospitalization HX:  


DEMENTIA, A-FIB, CAD, HTN, HYPOTHYROIDISM, HYPERLIPIDEMIA


FREQUENT FALLS


SUBDURAL HEMATOMA 05/2022 DUE TO FALL





G TUBE


Surgeries:  Yes (FEEDING TUBE)


Abdominal


Respiratory:  Yes (ASPIRATION PNEUMONIA)


Pneumonia


Currently Using CPAP:  No


Currently Using BIPAP:  No


Cardiac:  Yes (SEES DR. BUSBY UNSURE WHY)


Atrial Fibrillation, Coronary Artery Disease, High Cholesterol, Hypertension


Neurological:  Yes (SUBDURAL HEMATOMA 05/2022)


Dementia, Traumatic Brain Injury


Reproductive Disorders:  No


Female Reproductive Disorders:  Denies


GYN History:  Menopausal


Sexually Transmitted Disease:  No


HIV/AIDS:  No


Genitourinary:  Yes


UTI-Chronic


Gastrointestinal:  Yes (ASPIRATION)


Gastroesophageal Reflux


Musculoskeletal:  Yes (POOR AMBULATION, FREQUENT FALLS; MULTIPLE FRACTURES)


Fractures, Gout


Endocrine:  Yes


Hypothyroidsim


HEENT:  Yes


Cataract


Loss of Vision:  Denies


Hearing Impairment:  Hard of Hearing


Cancer:  No


Psychosocial:  Yes


Depression


Integumentary:  No


Blood Disorders:  No


Adverse Reaction/Blood Tranf:  No





Family Medical History





Dementia


  G8 SISTER


Diabetes mellitus


  19 FATHER


No Pertinent Family Hx





Physical Exam


Vital Signs





Vital Signs - First Documented








 5/7/23





 19:22


 


Temp 36.4


 


Pulse 73


 


Resp 16


 


B/P (MAP) 150/77 (101)


 


Pulse Ox 96


 


O2 Delivery Room Air





Capillary Refill :


Height, Weight, BMI


Height: 5'4.00"


Weight: 117lbs. 8.0oz. [id number]; 16.00 BMI


Method:Stated


General Appearance:  No Apparent Distress, Thin, Other (PT IS NON-VERBAL BUT IS 

ALERT, AND DOES NOT APPEAR TO BE IN ANY DISCOMFORT OR DISTRESS, THERE IS NO "FO

AMING AT THE MOUTH" OR COUGHING OR GAGGING; )


HEENT:  PERRL/EOMI, Other (EDENTULOUS. THERE IS NO DROOLING OR EXCESSIVE 

SECRETIONS/MUCOUS. PT IS NO GAGGING OR COUGHING OR CHOKING. SHE IS ABLE TO 

HANDLE SECRETIONS AT THIS TIME. )


Neck:  Normal Inspection


Respiratory:  Normal Breath Sounds, No Accessory Muscle Use, No Respiratory 

Distress


Cardiovascular:  Regular Rate, Rhythm, No Edema, No JVD, No Murmur, Normal 

Peripheral Pulses


Gastrointestinal:  Non Tender, Soft, Other (FEEDING TUBE IN PLACE, NO SIGNS OF 

INFECTION OR MALFUNCTION)


Extremity:  No Pedal Edema


Neurologic/Psychiatric:  Alert, Other (MOVES ALL EXTREMITIES, BUT IS NOT TALKING

OR FOLLOWING COMMANDS--PT WITH DEMENITA, AND IS AT NORMAL BASELINE. )


Skin:  Normal Color, Warm/Dry





Procedures/Interventions





   Suture Size:  5-0





Progress/Results/Core Measures


Suspected Sepsis


SIRS


Temperature: 


Pulse:  


Respiratory Rate: 


 


Laboratory Tests


5/7/23 19:40: White Blood Count 8.8


Blood Pressure  / 


Mean: 


 





Laboratory Tests


5/7/23 19:40: 


Creatinine 0.89, Platelet Count 265, Total Bilirubin 0.5








Results/Orders


Lab Results





Laboratory Tests








Test


 5/7/23


19:40 5/7/23


20:28 Range/Units


 


 


White Blood Count


 8.8 


 


 4.3-11.0


10^3/uL


 


Red Blood Count


 4.21 


 


 3.80-5.11


10^6/uL


 


Hemoglobin 13.3   11.5-16.0  g/dL


 


Hematocrit 39   35-52  %


 


Mean Corpuscular Volume 93   80-99  fL


 


Mean Corpuscular Hemoglobin 32   25-34  pg


 


Mean Corpuscular Hemoglobin


Concent 34 


 


 32-36  g/dL





 


Red Cell Distribution Width 14.7 H  10.0-14.5  %


 


Platelet Count


 265 


 


 130-400


10^3/uL


 


Mean Platelet Volume 8.9 L  9.0-12.2  fL


 


Immature Granulocyte % (Auto) 0    %


 


Neutrophils (%) (Auto) 56   42-75  %


 


Lymphocytes (%) (Auto) 34   12-44  %


 


Monocytes (%) (Auto) 9   0-12  %


 


Eosinophils (%) (Auto) 1   0-10  %


 


Basophils (%) (Auto) 0   0-10  %


 


Neutrophils # (Auto)


 4.9 


 


 1.8-7.8


10^3/uL


 


Lymphocytes # (Auto)


 3.0 


 


 1.0-4.0


10^3/uL


 


Monocytes # (Auto)


 0.8 


 


 0.0-1.0


10^3/uL


 


Eosinophils # (Auto)


 0.1 


 


 0.0-0.3


10^3/uL


 


Basophils # (Auto)


 0.0 


 


 0.0-0.1


10^3/uL


 


Immature Granulocyte # (Auto)


 0.0 


 


 0.0-0.1


10^3/uL


 


Sodium Level 133 L  135-145  MMOL/L


 


Potassium Level 3.9   3.6-5.0  MMOL/L


 


Chloride Level 97 L    MMOL/L


 


Carbon Dioxide Level 23   21-32  MMOL/L


 


Anion Gap 13   5-14  MMOL/L


 


Blood Urea Nitrogen 24 H  7-18  MG/DL


 


Creatinine


 0.89 


 


 0.60-1.30


MG/DL


 


Estimat Glomerular Filtration


Rate 62 


 


  





 


BUN/Creatinine Ratio 27    


 


Glucose Level 122 H    MG/DL


 


Calcium Level 10.2 H  8.5-10.1  MG/DL


 


Corrected Calcium 10.0   8.5-10.1  MG/DL


 


Total Bilirubin 0.5   0.1-1.0  MG/DL


 


Aspartate Amino Transf


(AST/SGOT) 27 


 


 5-34  U/L





 


Alanine Aminotransferase


(ALT/SGPT) 25 


 


 0-55  U/L





 


Alkaline Phosphatase 74     U/L


 


Total Protein 7.9   6.4-8.2  GM/DL


 


Albumin 4.2   3.2-4.5  GM/DL


 


Influenza Type A (RT-PCR)  Not Detected  Not Detecte  


 


Influenza Type B (RT-PCR)  Not Detected  Not Detecte  


 


SARS-CoV-2 RNA (RT-PCR)  Not Detected  Not Detecte  








My Orders





Orders - WALE,CESARIO K DO


Ed Iv/Invasive Line Start (5/7/23 19:25)


Monitor-Rhythm Ecg Trace Only (5/7/23 19:25)


Covid 19 Inhouse Test (5/7/23 19:25)


Influenza A And B By Pcr (5/7/23 19:25)


Isolation Central Supply Req (5/7/23 19:25)


Chest 1 View, Ap/Pa Only (5/7/23 19:25)


Cbc With Automated Diff (5/7/23 19:25)


Comprehensive Metabolic Panel (5/7/23 19:25)


Ct Chest/Abdomen/Pelvis Wo (5/7/23 20:02)





Vital Signs/I&O











 5/7/23





 19:22


 


Temp 36.4


 


Pulse 73


 


Resp 16


 


B/P (MAP) 150/77 (101)


 


Pulse Ox 96


 


O2 Delivery Room Air





Capillary Refill :


Progress Note :  


Progress Note


PLACED IN ISOLATION ROOM


PPE WORN





PT HAS NO SYMPTOMS OF ANY KIND AT THIS TIME. 


SHE IS NOT DROOLING OR CHOKING OR GAGGING, AND SHE IS ABLE TO HANDLE HER 

SECRETIONS


SHE IS RESTING QUIETLY AND DOES NOT APPEAR TO BE IN ANY DISCOMFORT OR DISTRESS





VITALS STABLE





UNEVENTFUL ER STAY


NO COUGH


NO DYSPNEA


NO HYPOXIA


NO FEVER


NO DIFFICULTY HANDLING HER SALIVA


NO VOMITING





DISCUSSED TEST RESULTS WITH CAREGIVERS, DISCUSSED IMPORTANCE OF PUREED DIET AT 

ALL TIMES, AND TO LIMIT ORAL INGESTIONS IF POSSIBLE, AS SHE HAS A FEEDING TUBE 

FOR THIS SPECIFIC PURPOSE DUE TO  HISTORY OF ASPIRATION. 


DISCUSSED ANTICIPATED COURSE, NEED FOR FOLLOW UP AND RETURN PRECAUTIONS.





Diagnostic Imaging





Comments


CXR--PER RADIOLOGIST REPORT AT 2002





FINDINGS: The lungs are hyperinflated. There is no alveolar


pneumonia. There is no effusion. There is no pneumothorax. Heart


size is unchanged. There is no current finding of edema or


failure.





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


humerus and there are also remote right-sided rib fractures.





IMPRESSION:


1. Hyperinflated but clear lungs. No finding of pneumonia or


edema.


2. 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


rib fractures. 





CT CHEST/ABDOMEN/PELVIS--PER RADIOLOGIST REPORT AT [id number]





FINDINGS: There is no finding of airspace consolidation or


evidence to suggest an acute pneumonia. There is no suspicious


nodule or mass. There is no pneumothorax. There is no pleural


effusion.





The visualized portion of the trachea demonstrates no evidence of


a filling defect. There is no filling defect within the major


pulmonary bronchi. The esophagus is unremarkable.





There is cardiomegaly. There are coronary calcifications. There


is no trace pericardial fluid.





The liver demonstrates no focal and abnormality. There are


gallstones within the gallbladder without biliary dilatation. The


pancreas is atrophic without focal abnormality. The spleen is


normal in size. There are splenic granulomas. There is no adrenal


mass. The kidneys demonstrate no hydronephrosis or urolithiasis.





There is a small hiatal hernia. A gastrostomy tube is in place.


There is no finding of small or large bowel dilation or bowel


obstruction. The appendix is normal. There are a few diverticula


but no findings of diverticulitis.





Urinary bladder demonstrates mild distention without bladder wall


thickening. The uterus is age-appropriate. There is no pelvic


free fluid. There is no finding of free air, abscess or


adenopathy. The aorta is normal in caliber.





Remote fracture deformities within the pubic rami are noted.


There is no hip dislocation. There is a chronic-appearing


superior endplate wedge compression of T12 with minimal height


loss. There is reidentification of the patient's nonunited left


humerus fracture and remote right rib fractures. No acute or


suspicious osseous abnormality demonstrated.





IMPRESSION:


1. No CT finding to suggest pneumonia. No pulmonary infiltrate or


effusion evident. 


2. No filling defect evident within the trachea or major bronchi.





3. Atherosclerosis and coronary artery disease.


4. Cholelithiasis. There is no biliary dilatation. 


5. No bowel obstruction or diverticulitis.


6. No free fluid, adenopathy or abscess. 


7. Remote fractures of the pelvic rami and reidentification of a


nonunited fracture of the left humerus. Multiple remote


right-sided rib fractures are also noted.


   Reviewed:  Reviewed by Me





Departure


Impression





   Primary Impression:  


   Dysphagia


   Additional Impressions:  


   Dementia


   Feeding by G-tube


   History of aspiration pneumonia


Disposition:  01 HOME, SELF-CARE


Condition:  Stable





Departure-Patient Inst.


Decision time for Depature:  20:40


Referrals:  


MEE HERNANDEZ DO (PCP)


Primary Care Physician








[facility] (Family)


Primary Care Physician


Patient Instructions:  Dementia ED, Dysphagia (DC), Thickening Liquids for 

Dysphagia Diet





Add. Discharge Instructions:  


GIVE MEDICATIONS AS PRESCRIBED





PUREED DIET IF PT TAKES ANYTHING BY MOUTH. 





FOLLOW UP WITH YOUR DR AS NEEDED











CESARIO ECHEVARRIA DO                  May 7, 2023 19:39

## 2023-05-07 NOTE — DIAGNOSTIC IMAGING REPORT
INDICATION: Productive cough.



COMPARISON: 11/01/2022.



FINDINGS: The lungs are hyperinflated. There is no alveolar

pneumonia. There is no effusion. There is no pneumothorax. Heart

size is unchanged. There is no current finding of edema or

failure.



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

humerus and there are also remote right-sided rib fractures.



IMPRESSION:

1. Hyperinflated but clear lungs. No finding of pneumonia or

edema.

2. 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

rib fractures. 



Dictated by: 



  Dictated on workstation # [id number]

## 2023-06-12 NOTE — DIAGNOSTIC IMAGING REPORT
INDICATION: PEG tube was pulled out.



 radiograph shows catheter projecting over the left

hemiabdomen. Contrast was injected and frontal and crosstable

lateral views performed showed the retention balloon inflated

within the gastric lumen. Contrast injection opacifies the

nondistended stomach with gastric emptying and opacification

throughout the length of the duodenum. No extravasation. 



IMPRESSION: 

The device is patent and in excellent position, the stomach is

unobstructed. 



Dictated by: 



  Dictated on workstation # [id number]

## 2023-06-12 NOTE — ED GI
General


Chief Complaint:  Abdominal/GI Problems


Stated Complaint:  PULLED OUT PEG TUBE


Nursing Triage Note:  


Pt presents via wheelchair with caregivers. She pulled out her peg tube sometime




between 0330 and 0430. Pt has peg for adding nutrition and medication 


administration.


Source of Information:  Caregiver


 (PABLO CEE MD)





History of Present Illness


Date Seen by Provider:  2023


Time Seen by Provider:  05:38


Initial Comments


Patient is an 88-year-old female who presents to the emergency department with 

caregivers chief complaint dislodged PEG tube.  Happened between 330 and 430 

this morning.  She had a 20 [place].


Timing/Duration:  1-3 Hours


 (PABLO CEE MD)





Allergies and Home Medications


Allergies


Coded Allergies:  


     melatonin (Verified  Allergy, Unknown, 22)


     quetiapine (Verified  Adverse Reaction, Unknown, CONFUSION, 22)


   confusion





Patient Home Medication List


Home Medication List Reviewed:  Yes


 (PABLO CEE MD)


Acetaminophen (Acetaminophen) 500 Mg Tablet, 1,000 MG PO TID, (Reported)


   Entered as Reported by: MEE ROMAN on 22


Acetaminophen (Acetaminophen) 325 Mg/10.15 Ml Soln, 20.3 ML PO Q6H PRN for PAIN-

MILD (1-4), (Reported)


   Entered as Reported by: MEE ROMAN on 22


Alendronate Sodium (Alendronate Sodium) 70 Mg Tablet, 70 MG PO FRI, (Reported)


   Entered as Reported by: MEE ROMAN on 22 0851


Allopurinol (Allopurinol) 100 Mg Tablet, 100 MG PO DAILY, (Reported)


   Entered as Reported by: MEE ROMAN on 22


Amlodipine Besylate (Amlodipine Besylate) 5 Mg Tablet, 5 MG PO DAILY, (Reported)


   Entered as Reported by: MEE ROMAN on 22


Ascorbic Acid (Vitamin C) 1,000 Mg Tablet, 1,000 MG PO DAILY, (Reported)


   Entered as Reported by: MEE ROMAN on 22


Aspirin (Aspirin) 81 Mg Tab.chew, 81 MG PO HS, (Reported)


   Entered as Reported by: MEE ROMAN on 22


Carboxymethylcellulose Sodium (Refresh Tears) 0.5 % Drops, 1 DROP OU QID, 

(Reported)


   Entered as Reported by: MEE ROMAN on 22


Cholecalciferol (Vitamin D3) (Vitamin D3) 125 Mcg (5000 Unit) Capsule, 125 MCG 

PO DAILY, (Reported)


   Entered as Reported by: MEE ROMAN on 22


Diclofenac Sodium (Diclofenac Sodium) 1 % Gel..gram., 2 GM TOP QID PRN for PAIN-

BREAKTHROUGH, (Reported)


   Entered as Reported by: MEE ROMAN on 22


Doxazosin Mesylate (Doxazosin Mesylate) 1 Mg Tablet, 1 MG PO DAILY, (Reported)


   Entered as Reported by: MEE ROMAN on 22


Eyelid Cleanser Combination #5 (Ocusoft Lid Scrub) 1 Each Med..pad, 1 EACH TP 

DAILY, (Reported)


   Entered as Reported by: MEE ROMAN on 22


Famotidine (Pepcid) 20 Mg Tablet, 20 MG PEG BID


   Prescribed by: JASON RODRIGUEZ on 22


Lactulose (Lactulose) 10 Gram/15 Ml Solution, 10 GM PO BID, (Reported)


   Entered as Reported by: MEE ROMAN on 22


Multivitamin (Multivitamin) 1 Each Tablet, 1 EACH PO DAILY, (Reported)


   Entered as Reported by: MEE ROMAN on 22


Polyethylene Glycol 3350 (Miralax) 17 Gram Powd.pack, 17 GM PO HS, (Reported)


   Entered as Reported by: MEE ROMAN on 22


Polymyxin B Sulf/Trimethoprim (Polytrim Eye Drops) 10,000 Unit-1 Mg/Ml Drops, 1 

DROP OU TID, (Reported)


   Entered as Reported by: MEE ROMAN on 22


Potassium Bicarbonate/Cit AC (Effer-K 20 Meq Tablet Eff) 20 Meq Tablet.eff, 20 

MEQ PEG DAILY@0700


   Prescribed by: JASON RODRIGUEZ on 22


Quetiapine Fumarate (Quetiapine Fumarate) 25 Mg Tablet, 12.5 MG PO HS, 

(Reported)


   Entered as Reported by: MEE ROMAN on 11/1/22 0939


Sennosides/Docusate Sodium (Senna Plus Tablet) 8.6 Mg-50 Mg Tablet, 1 EACH PO 

Q12H, (Reported)


   Entered as Reported by: MEE ROMAN on 22 0851





Review of Systems


Review of Systems


Constitutional:  see HPI


Gastrointestinal:  Other (peg tube dislodged) (PABLO CEE MD)





Past Medical-Social-Family Hx


Immunizations Up To Date


Tetanus Booster (TDap):  Unknown


PED Vaccines UTD:  No


First/Initial COVID19 Vaccinat:  YES


Second COVID19 Vaccination Brayden:  YES


Third COVID19 Vaccination Date:  YES


 (PABLO CEE MD)





Seasonal Allergies


Seasonal Allergies:  No


 (PABLO CEE MD)





Past Medical History


Surgery/Hospitalization HX:  


DEMENTIA, A-FIB, CAD, HTN, HYPOTHYROIDISM, HYPERLIPIDEMIA


FREQUENT FALLS


SUBDURAL HEMATOMA 2022 DUE TO FALL


G TUBE


Surgeries:  Yes (FEEDING TUBE)


Abdominal


Respiratory:  Yes (ASPIRATION PNEUMONIA)


Pneumonia


Currently Using CPAP:  No


Currently Using BIPAP:  No


Cardiac:  Yes (SEES DR. BUSBY UNSURE WHY)


Atrial Fibrillation, Coronary Artery Disease, High Cholesterol, Hypertension


Neurological:  Yes (SUBDURAL HEMATOMA 2022)


Dementia, Traumatic Brain Injury


Reproductive Disorders:  No


Female Reproductive Disorders:  Denies


GYN History:  Menopausal


Sexually Transmitted Disease:  No


HIV/AIDS:  No


Genitourinary:  Yes


UTI-Chronic


Gastrointestinal:  Yes (ASPIRATION)


Gastroesophageal Reflux


Musculoskeletal:  Yes (POOR AMBULATION, FREQUENT FALLS; MULTIPLE FRACTURES)


Fractures, Gout


Endocrine:  Yes


Hypothyroidsim


HEENT:  Yes


Cataract


Loss of Vision:  Denies


Hearing Impairment:  Hard of Hearing


Cancer:  No


Psychosocial:  Yes


Depression


Integumentary:  No


Blood Disorders:  No


Adverse Reaction/Blood Tranf:  No


 (PABLO CEE MD)





Family Medical History





Dementia


  G8 SISTER


Diabetes mellitus


  19 FATHER


No Pertinent Family Hx


 (PABLO CEE MD)





Physical Exam


Vital Signs





Vital Signs - First Documented








 23





 05:38


 


Temp 35.3


 


Pulse 80


 


Resp 16


 


B/P (MAP) 142/73 (96)





 (NARENDRA ROSAS MD)


Vital Signs


Capillary Refill : Less Than 3 Seconds 


 (PABLO CEE MD)


Height/Weight/BMI


Height: 5'4.00"


Weight: 117lbs. 8.0oz. [id number]; 20.00 BMI


Method:Stated


General Appearance:  WD/WN, no apparent distress


HEENT:  PERRL/EOMI


Respiratory:  no respiratory distress, no accessory muscle use


Gastrointestinal:  soft, other (stoma left upper quadrant with a little 

surrounding dark blood)


Neurologic/Psychiatric:  alert


Skin:  normal color, warm/dry (PABLO CEE MD)





Procedures/Interventions





   Suture Size:  5-0


 (PABLO CEE MD)





Progress/Results/Core Measures


Results/Orders


Medications Given in ED





Current Medications








 Medications  Dose


 Ordered  Sig/Familia


 Route  Start Time


 Stop Time Status Last Admin


Dose Admin


 


 Diatrizoate


 Meglum/


 Diatrizoate Sod  120 ml  ONCE  ONCE


 NG  23 06:30


 23 06:31 DC 23 06:27


30 ML





 (NARENDRA ROSAS MD)


Vital Signs/I&O











 23





 05:38


 


Temp 35.3


 


Pulse 80


 


Resp 16


 


B/P (MAP) 142/73 (96)





 (NARENDRA ROSAS MD)








Blood Pressure Mean:                    96











Progress


Progress Note :  


   Time:  05:51


Progress Note


Skin around stoma cleansed with normal saline. 20Fr Peg lubricated with KY jelly

and gentle pressure used to replace the peg.  KUB ordered with gastroview to 

confirm placement.


 (PABLO CEE MD)


Progress Note :  


Progress Note


0645: I did review x-ray of the abdomen after PEG tube replacement.  This does 

show Gastrografin within stomach and proximal small intestines without 

extravasation on my interpretation.  Discharged home with return precautions.  

Verbalized understanding instructions and agreement with plan.


 (NARENDRA ROSAS MD)





Diagnostic Imaging





   Diagonstic Imaging:  Xray


   Plain Films/CT/US/NM/MRI:  abdomen


Comments


NAME:   KIYAANGELAEMILISusyJERARDODARRION


[facility] REC#:   [id number]


ACCOUNT#:   [id number]


PT STATUS:   REG ER


:   1935


PHYSICIAN:   PABLO CEE MD


ADMIT DATE:   23/ER


                                   ***Draft***


Date of Exam:23





PEG TUBE CHECK








INDICATION: PEG tube was pulled out.





 radiograph shows catheter projecting over the left


hemiabdomen. Contrast was injected and frontal and crosstable


lateral views performed showed the retention balloon inflated


within the gastric lumen. Contrast injection opacifies the


nondistended stomach with gastric emptying and opacification


throughout the length of the duodenum. No extravasation. 





IMPRESSION: 


The device is patent and in excellent position, the stomach is


unobstructed. 





  Dictated on workstation # [id number]








Dict:   [id number]


Trans:   23 0702


[facility] [id number]





Interpreted by:     YANIQUE BARRAGAN


Electronically signed by:  


 (NARENDRA ROSAS MD)





Departure


Impression





   Primary Impression:  


   Dislodged gastrostomy tube


Disposition:  01 HOME, SELF-CARE


Condition:  Improved





Departure-Patient Inst.


Decision time for Depature:  06:48


 (NARENDRA ROSAS MD)


Referrals:  


MEE HERNANDEZ DO (PCP)


Primary Care Physician








[facility] (Family)


Primary Care Physician


Patient Instructions:  Percutaneous Endoscopic Gastrostomy (DC)





Add. Discharge Instructions:  


You may resume peg tube feeds.





Return to the Emergency Department for any new or concerning symptoms.





Copy


Copies To 1:   MEE HERNANDEZ KATHRYN M MD         2023 05:53


NARNEDRA ROSAS MD          2023 06:48

## 2023-06-24 NOTE — ED GENERAL
General


Chief Complaint:  Neurological Problems


Stated Complaint:  POSSIBLE STROKE


Nursing Triage Note:  


PT TO ROOM 03 VIA [facility] WITH C/O POSSIBLE STROKE. EMS REPORTS LEFT SIDE WEAKNESS




THAT IS NEW. EMS REPORTS PT HAS HX OF TIA. LAST KNOW WELL TIME  TODAY.


Source of Information:  EMS, Family (son)





History of Present Illness


Date Seen by Provider:  2023


Time Seen by Provider:  20:10


Initial Comments


Patient is an 88-year-old female who presents from a local nursing home with 

chief complaint of concern for possible seizure.  Her son is with her and states

that she was in a wheelchair, outside and they decided to go back inside with 

staff.  Son states that he witnessed her shake all over briefly and then slumped

back in her chair.  He states he rubbed the back of her hair and her shoulders 

and she came back around.  He was concerned that she may have had a seizure or 

another stroke.  He states that she is back to baseline.  She has had previous 

subdural hematoma 2 years ago which left her nonverbal.  She is usually PEG tube

fed.  She can nod her head yes and no.  Otherwise she is not 

independent/ambulatory.  Son states that she has had an "infection" recently but

he is not sure what it was.





Son is a difficult historian secondary to deafness and mental challenges.








Further history from Kely's nighttime caregiver who was actually with her 

during this event -she states that Kely pushed back in her chair and became 

very "stiff" and her eyes "glazed over".  She then seemed to shake for about 30 

seconds.  She was very poorly responsive for about 5 minutes after this.  No t

rauma.  Her nighttime caregiver has never seen her do anything like this in the 

past.


Timing/Duration:  1 Hour


Severity:  Mild





Allergies and Home Medications


Allergies


Coded Allergies:  


     melatonin (Verified  Allergy, Unknown, 22)


     quetiapine (Verified  Adverse Reaction, Unknown, CONFUSION, 22)


   confusion





Patient Home Medication List


Home Medication List Reviewed:  Yes


Acetaminophen (Acetaminophen) 500 Mg Tablet, 1,000 MG PO TID, (Reported)


   Entered as Reported by: MEE ROMAN on 22 0923


Acetaminophen (Acetaminophen) 325 Mg/10.15 Ml Soln, 20.3 ML PO Q6H PRN for PAIN-

MILD (1-4), (Reported)


   Entered as Reported by: MEE ROMAN on 22


Alendronate Sodium (Alendronate Sodium) 70 Mg Tablet, 70 MG PO FRI, (Reported)


   Entered as Reported by: MEE ROMAN on 22


Allopurinol (Allopurinol) 100 Mg Tablet, 100 MG PO DAILY, (Reported)


   Entered as Reported by: MEE ROMAN on 22


Amlodipine Besylate (Amlodipine Besylate) 5 Mg Tablet, 5 MG PO DAILY, (Reported)


   Entered as Reported by: MEE ROMAN on 22


Ascorbic Acid (Vitamin C) 1,000 Mg Tablet, 1,000 MG PO DAILY, (Reported)


   Entered as Reported by: MEE ROMAN on 22


Aspirin (Aspirin) 81 Mg Tab.chew, 81 MG PO HS, (Reported)


   Entered as Reported by: MEE ROMAN on 22


Carboxymethylcellulose Sodium (Refresh Tears) 0.5 % Drops, 1 DROP OU QID, 

(Reported)


   Entered as Reported by: MEE ROMAN on 22


Cholecalciferol (Vitamin D3) (Vitamin D3) 125 Mcg (5000 Unit) Capsule, 125 MCG 

PO DAILY, (Reported)


   Entered as Reported by: MEE ROMAN on 22


Diclofenac Sodium (Diclofenac Sodium) 1 % Gel..gram., 2 GM TOP QID PRN for PAIN-

BREAKTHROUGH, (Reported)


   Entered as Reported by: MEE ROMAN on 22


Doxazosin Mesylate (Doxazosin Mesylate) 1 Mg Tablet, 1 MG PO DAILY, (Reported)


   Entered as Reported by: MEE ROMAN on 22


Eyelid Cleanser Combination #5 (Ocusoft Lid Scrub) 1 Each Med..pad, 1 EACH TP 

DAILY, (Reported)


   Entered as Reported by: MEE ROMAN on 22


Famotidine (Pepcid) 20 Mg Tablet, 20 MG PEG BID


   Prescribed by: JASON RODRIGUEZ on 22 09


Lactulose (Lactulose) 10 Gram/15 Ml Solution, 10 GM PO BID, (Reported)


   Entered as Reported by: MEE ROMAN on 11/1/22 0939


Multivitamin (Multivitamin) 1 Each Tablet, 1 EACH PO DAILY, (Reported)


   Entered as Reported by: MEE ROMAN on 22 08


Polyethylene Glycol 3350 (Miralax) 17 Gram Powd.pack, 17 GM PO HS, (Reported)


   Entered as Reported by: MEE ROMAN on 22 08


Polymyxin B Sulf/Trimethoprim (Polytrim Eye Drops) 10,000 Unit-1 Mg/Ml Drops, 1 

DROP OU TID, (Reported)


   Entered as Reported by: MEE ROMAN on 22 08


Potassium Bicarbonate/Cit AC (Effer-K 20 Meq Tablet Eff) 20 Meq Tablet.eff, 20 

MEQ PEG DAILY@0700


   Prescribed by: JASON RODRIGUEZ on 22 0932


Quetiapine Fumarate (Quetiapine Fumarate) 25 Mg Tablet, 12.5 MG PO HS, 

(Reported)


   Entered as Reported by: MEE ROMAN on 22 09


Sennosides/Docusate Sodium (Senna Plus Tablet) 8.6 Mg-50 Mg Tablet, 1 EACH PO 

Q12H, (Reported)


   Entered as Reported by: MEE ROMAN on 22 08





Review of Systems


Review of Systems


Constitutional:  see HPI


Unable to obtain from the patient due to prior stroke/nonverbal status





Past Medical-Social-Family Hx


Patient Social History


Tobacco Use?:  No


Smoking Status:  Never a Smoker


Smokeless Tobacco Frequency:  Never a User


Use of E-Cig and/or Vaping dev:  No


Use of E-Cig and/or Vaping Waqas:  Never a User


Substance use?:  No


Alcohol Use?:  No


Pt feels they are or have been:  No





Immunizations Up To Date


Tetanus Booster (TDap):  Unknown


PED Vaccines UTD:  No


First/Initial COVID19 Vaccinat:  YES


Second COVID19 Vaccination Brayden:  YES


Third COVID19 Vaccination Date:  YES





Seasonal Allergies


Seasonal Allergies:  No





Past Medical History


Surgery/Hospitalization HX:  


DEMENTIA, A-FIB, CAD, HTN, HYPOTHYROIDISM, HYPERLIPIDEMIA


FREQUENT FALLS


SUBDURAL HEMATOMA 2022 DUE TO FALL


G TUBE


Surgeries:  Yes (FEEDING TUBE)


Abdominal


Respiratory:  Yes (ASPIRATION PNEUMONIA)


Pneumonia


Currently Using CPAP:  No


Currently Using BIPAP:  No


Cardiac:  Yes (SEES DR. KEHINDE UNSURE WHY)


Atrial Fibrillation, Coronary Artery Disease, High Cholesterol, Hypertension


Neurological:  Yes (SUBDURAL HEMATOMA 2022)


Dementia, Traumatic Brain Injury


Reproductive Disorders:  No


Female Reproductive Disorders:  Denies


GYN History:  Menopausal


Sexually Transmitted Disease:  No


HIV/AIDS:  No


Genitourinary:  Yes


UTI-Chronic


Gastrointestinal:  Yes (ASPIRATION)


Gastroesophageal Reflux


Musculoskeletal:  Yes (POOR AMBULATION, FREQUENT FALLS; MULTIPLE FRACTURES)


Fractures, Gout


Endocrine:  Yes


Hypothyroidsim


HEENT:  Yes


Cataract


Loss of Vision:  Denies


Hearing Impairment:  Hard of Hearing


Cancer:  No


Psychosocial:  Yes


Depression


Integumentary:  No


Blood Disorders:  No


Adverse Reaction/Blood Tranf:  No





Family Medical History





Dementia


  G8 SISTER


Diabetes mellitus


  19 FATHER


No Pertinent Family Hx





Physical Exam


Vital Signs





Vital Signs - First Documented








 23





 20:06


 


Temp 36.1


 


Pulse 82


 


Resp 14


 


B/P (MAP) 152/80 (104)


 


O2 Delivery Room Air





Capillary Refill : Less Than 3 Seconds


Height, Weight, BMI


Height: 5'4.00"


Weight: 117lbs. 8.0oz. 53.793350xi; 20.00 BMI


Method:Stated


General Appearance:  No Apparent Distress, Thin


Eyes:  Bilateral Eye PERRL


HEENT:  Other (Dry oral mucosa)


Respiratory:  Lungs Clear, Normal Breath Sounds, No Accessory Muscle Use, No 

Respiratory Distress


Cardiovascular:  Regular Rate, Rhythm, Normal Peripheral Pulses


Gastrointestinal:  Soft, Tenderness (Lower abdomen seems to be tender to deep 

palpation positive bowel sounds)





Procedures/Interventions





   Suture Size:  5-0





Progress/Results/Core Measures


Suspected Sepsis


SIRS


Temperature: 


Pulse: 82 


Respiratory Rate: 14


 


Laboratory Tests


23 20:38: White Blood Count 8.3


Blood Pressure 152 /80 


Mean: 104


 


Laboratory Tests


23 20:38: 


Creatinine 0.98, Platelet Count 244, Total Bilirubin 0.4








Results/Orders


Lab Results





Laboratory Tests








Test


 23


20:38 23


20:55 Range/Units


 


 


White Blood Count


 8.3 


 


 4.3-11.0


10^3/uL


 


Red Blood Count


 3.83 


 


 3.80-5.11


10^6/uL


 


Hemoglobin 11.9   11.5-16.0  g/dL


 


Hematocrit 36   35-52  %


 


Mean Corpuscular Volume 94   80-99  fL


 


Mean Corpuscular Hemoglobin 31   25-34  pg


 


Mean Corpuscular Hemoglobin


Concent 33 


 


 32-36  g/dL





 


Red Cell Distribution Width 14.3   10.0-14.5  %


 


Platelet Count


 244 


 


 130-400


10^3/uL


 


Mean Platelet Volume 8.7 L  9.0-12.2  fL


 


Immature Granulocyte % (Auto) 0    %


 


Neutrophils (%) (Auto) 68   42-75  %


 


Lymphocytes (%) (Auto) 20   12-44  %


 


Monocytes (%) (Auto) 10   0-12  %


 


Eosinophils (%) (Auto) 1   0-10  %


 


Basophils (%) (Auto) 0   0-10  %


 


Neutrophils # (Auto)


 5.7 


 


 1.8-7.8


10^3/uL


 


Lymphocytes # (Auto)


 1.7 


 


 1.0-4.0


10^3/uL


 


Monocytes # (Auto)


 0.9 


 


 0.0-1.0


10^3/uL


 


Eosinophils # (Auto)


 0.1 


 


 0.0-0.3


10^3/uL


 


Basophils # (Auto)


 0.0 


 


 0.0-0.1


10^3/uL


 


Immature Granulocyte # (Auto)


 0.0 


 


 0.0-0.1


10^3/uL


 


Sodium Level 135   135-145  MMOL/L


 


Potassium Level 4.4   3.6-5.0  MMOL/L


 


Chloride Level 100     MMOL/L


 


Carbon Dioxide Level 24   21-32  MMOL/L


 


Anion Gap 11   5-14  MMOL/L


 


Blood Urea Nitrogen 23 H  7-18  MG/DL


 


Creatinine


 0.98 


 


 0.60-1.30


MG/DL


 


Estimat Glomerular Filtration


Rate 56 


 


  





 


BUN/Creatinine Ratio 23    


 


Glucose Level 132 H    MG/DL


 


Calcium Level 10.0   8.5-10.1  MG/DL


 


Corrected Calcium 10.2 H  8.5-10.1  MG/DL


 


Total Bilirubin 0.4   0.1-1.0  MG/DL


 


Aspartate Amino Transf


(AST/SGOT) 23 


 


 5-34  U/L





 


Alanine Aminotransferase


(ALT/SGPT) 25 


 


 0-55  U/L





 


Alkaline Phosphatase 76     U/L


 


Total Protein 6.9   6.4-8.2  GM/DL


 


Albumin 3.7   3.2-4.5  GM/DL


 


Urine Color  YELLOW   


 


Urine Clarity  CLEAR   


 


Urine pH  7.0  5-9  


 


Urine Specific Gravity  1.010 L 1.016-1.022  


 


Urine Protein  TRACE H NEGATIVE  


 


Urine Glucose (UA)  NEGATIVE  NEGATIVE  


 


Urine Ketones  NEGATIVE  NEGATIVE  


 


Urine Nitrite  NEGATIVE  NEGATIVE  


 


Urine Bilirubin  NEGATIVE  NEGATIVE  


 


Urine Urobilinogen  0.2  < = 1.0  MG/DL


 


Urine Leukocyte Esterase  1+ H NEGATIVE  


 


Urine RBC (Auto)  NEGATIVE  NEGATIVE  


 


Urine RBC  NONE   /HPF


 


Urine WBC  10-25 H  /HPF


 


Urine Squamous Epithelial


Cells 


 RARE 


  /HPF





 


Urine Crystals  NONE   /LPF


 


Urine Bacteria  FEW H  /HPF


 


Urine Casts  PRESENT   /LPF


 


Urine Hyaline Casts  2-5 H  /LPF


 


Urine Mucus  NEGATIVE   /LPF


 


Urine Culture Indicated  YES   








My Orders





Orders - PABLO CEE MD


Ed Iv/Invasive Line Start (23 20:21)


Cbc With Automated Diff (23 20:21)


Comprehensive Metabolic Panel (23 20:21)


Ua Culture If Indicated (23 20:21)


Bladder Scan (23 20:21)


Ct Head Wo (23 20:21)


Urine Culture (23 20:55)


Levetiracetam Injection (Keppra Injectio (23 21:22)


Cephalexin Oral Suspension (Keflex Oral (23 21:22)





Vital Signs/I&O











 23





 20:06


 


Temp 36.1


 


Pulse 82


 


Resp 14


 


B/P (MAP) 152/80 (104)


 


O2 Delivery Room Air





Capillary Refill : Less Than 3 Seconds








Blood Pressure Mean:                    104








Progress Note :  


   Time:  21:15


Progress Note


Patient seen and evaluated by me.  Evaluation today includes physical exam, CBC,

Chem-12, urinalysis and CT head without contrast.  Pertinent physical exam 

findings elderly female in no acute distress, nonverbal due to prior "stroke".  

She appears to move all of her extremities equally.  No facial droop is 

appreciated.  She is able to answer "yes and no" it seems appropriately with 

head nods.  She seems to have a little tenderness in the lower abdomen on 

palpation.  No lower extremity edema.  Pupils are equal.





Differential diagnosis based on history and physical exam, TIA, stroke, seizure,

infection.





Labs independently reviewed and interpreted by me.  CBC is normal.  Chem-12 is 

pertinent for just slightly elevated glucose at 132.  Cath UA specimen is 

negative for nitrite, positive 1+ LE, 10-25 white blood cells and mild bacteria.

 CT head noncontrast shows findings consistent with advanced age, atrophy small 

vessel disease.  Her vital signs remained stable.  She is reexamined at this 

time, smiling more alert.  Continues to move all 4 extremities equally no 

evidence of unilateral weakness.  No facial droop.  Suspect she may have had a 

seizure potentially due to urinary tract infection or prior stroke.  Will load 

with 500 mg of Keppra and start her on Keppra twice daily to follow-up with her 

primary care physician.  I have discussed this with the caregiver at the bedside

as well as her daughter via phone.  All questions are sought and answered.





Diagnostic Imaging





   Diagonstic Imaging:  CT


Comments


                 [facility] [place], [place]





NAME:   KELY GARCIA


[facility] REC#:   [id number]


ACCOUNT#:   [id number]


PT STATUS:   REG ER


:   1935


PHYSICIAN:   PABLO CEE MD


ADMIT DATE:   23/ER


                                  ***Signed***


Date of Exam:23





CT HEAD WO








PROCEDURE: CT head without contrast.





TECHNIQUE: Multiple contiguous axial images were obtained through


the brain without the use of intravenous contrast. Auto Exposure


Controls were utilized during the CT exam to meet ALARA standards


for radiation dose reduction. 





INDICATION: Questionable seizure.





COMPARISON: 2023.





FINDINGS: There is prominence of the ventricles and sulci. There


is no hydrocephalus or cerebral edema. There is no midline shift


or mass-effect. There is no intracranial mass, hemorrhage or


extra-axial fluid collection. There is some diffuse decreased


attenuation of the periventricular white matter which is


nonspecific. The visualized paranasal sinuses and mastoid air


cells are clear. There are no regional areas of decreased


attenuation appreciated to suggest an acute CVA.





IMPRESSION: 


1. No acute intracranial process.


2. Age-appropriate atrophy.


3. Decreased attenuation of the periventricular white matter


which is nonspecific, however, likely reflects senescent change


and/or chronic small vessel ischemic disease.





Dictated by: 





  Dictated on workstation # [id number]








Dict:   23


Trans:   23


[facility] [id number]





Interpreted by:     GEORGE GALE MD


Electronically signed by: GEORGE GALE MD 23





Departure


Impression





   Primary Impression:  


   Urinary tract infection


   Qualified Codes:  N30.00 - Acute cystitis without hematuria


   Additional Impression:  


   Concern for seizure


Disposition:   HOME, SELF-CARE


Condition:  Stable





Departure-Patient Inst.


Decision time for Depature:  21:24


Referrals:  


MEE HERNANDEZ DO (PCP)


Primary Care Physician








[facility] (Family)


Primary Care Physician


Patient Instructions:  Urinary tract infections in adults





Add. Discharge Instructions:  


Give the Keflex 500 mg per PEG tube 3 times a day for the next 3 days.





I am starting her on Keppra which is a seizure medication.  5 ml Twice daily per

PEG tube.





Please call on Monday morning for a follow-up appointment with Dr. Hernandez next 

week.





Monitor for further shaking episodes or episodes of unresponsiveness.  Return to

the emergency department for any new, concerning or emergent complaints.


Scripts


Levetiracetam (Keppra) 100 Mg/Ml Solution


500 MG PEG BID, #300 ML 1 Refill


   Prov: PABLO CEE MD         23 


Cephalexin (Cephalexin) 250 Mg/5 Ml Susp.recon


500 MG PEG TID for 3 Days, #100 ML


   Prov: PABLO CEE MD         23





Copy


Copies To 1:   MEE HERNANDEZ KATHRYN M MD         2023 20:55

## 2023-06-24 NOTE — DIAGNOSTIC IMAGING REPORT
PROCEDURE: CT head without contrast.



TECHNIQUE: Multiple contiguous axial images were obtained through

the brain without the use of intravenous contrast. Auto Exposure

Controls were utilized during the CT exam to meet ALARA standards

for radiation dose reduction. 



INDICATION: Questionable seizure.



COMPARISON: 03/25/2023.



FINDINGS: There is prominence of the ventricles and sulci. There

is no hydrocephalus or cerebral edema. There is no midline shift

or mass-effect. There is no intracranial mass, hemorrhage or

extra-axial fluid collection. There is some diffuse decreased

attenuation of the periventricular white matter which is

nonspecific. The visualized paranasal sinuses and mastoid air

cells are clear. There are no regional areas of decreased

attenuation appreciated to suggest an acute CVA.



IMPRESSION: 

1. No acute intracranial process.

2. Age-appropriate atrophy.

3. Decreased attenuation of the periventricular white matter

which is nonspecific, however, likely reflects senescent change

and/or chronic small vessel ischemic disease.



Dictated by: 



  Dictated on workstation # [id number] FAMILY HISTORY:  Mother  Still living? Yes, Estimated age: 81-90  Family history of osteoporosis, Age at diagnosis: Age Unknown  Family history of spinal stenosis, Age at diagnosis: Age Unknown  FHx: coronary artery disease, Age at diagnosis: Age Unknown

## 2023-07-28 NOTE — DIAGNOSTIC IMAGING REPORT
HISTORY: Dyspnea.



COMPARISON: 07/20/2023.



TECHNIQUE: Frontal view of the chest.



FINDINGS: There is a small left pleural effusion with left

basilar airspace opacity, mildly improved since the prior exam.

There is no pneumothorax. The cardiac silhouette is stable in

size. There is aortic atherosclerosis. Bone density is diffusely

low.



IMPRESSION: Small left pleural effusion with left basilar

airspace opacity, mildly improved since 07/20/2023. 



Dictated by: 



  Dictated on workstation # [id number]

## 2023-07-28 NOTE — ED RESPIRATORY
General


Chief Complaint:  Respiratory Problems


Stated Complaint:  SOA





History of Present Illness


Date Seen by Provider:  Jul 28, 2023


Time Seen by Provider:  19:47


Initial Comments


PT ARRIVES VIA EMS FROM HOME


EMS WAS CALLED FOR PT WITH "BREATHING WEIRD AND WANT A CHEST XRAY DONE" 








PT WAS ADMITTED 07/14/23-07/25/23 FOR UPPER GI BLEED, UTI


HOSPICE WAS DISCUSSED, BUT FAMILY DECLINED 


FAMILY NOW WANT "SHORT TERM PLACEMENT" AT [facility]


PT HAD BEEN ADMITTED TO VIA [facility], THEN FAMILY MOVED HER 

BACK HOME DECEMBER 2022


SHE HAS HAD A MULTITUDE OF VISITS HERE. ESPECIALLY SINCE SHE HAS BEEN AT HOME. 








PT WITH DEMENTIA, AND IS NON-VERBAL


SHE HAS A SON WHO IS DEAF, AND HAS CARETAKER 24 HOURS, AND BOTH ARE  HERE WITH 

PT. 


SHE HAD BEEN IN NURSING HOME, AND THEN MOVED BACK HOME IN DECEMBER 2022


CARETAKER REPORTS THAT PT IS AT NORMAL BASELINE. 





PT HAS LONG HISTORY OF ASPIRATION WITH PNEUMONIA, AND HAS A PERMANENT FEEDING 

TUBE IN PLACE FOR THAT PROBLEM


HOWEVER, PT CONTINUES TO EAT FOOD, DESPITE EXTENSIVE HISTORY OF ASPIRATION 

--CARETAKER STATES SHE EATS 3 MEALS A DAY PLUS 2 SNACKS DURING THE DAY, AND GETS

TUBE FEEDING TWICE A DAY WITH JEVITY.  SHE EATS BOTH SOFT AND SOLID FOOD. 





SHE HAS CHRONIC ATRIAL FIBRILLATION, CAD, HTN, HYPERLIPIDEMIA, HYPOTHYROIDISM 

AND GOUT


SHE HAS FREQUENT FALLS WITH MULTIPLE FRACTURES  AND HAD SUBDURAL HEMATOMA  

05/2022 DUE TO FALL


SHE IS NOT ON  BLOOD THINNERS AT THIS TIME, BUT SHE DOES TAKE 81 MG ASPIRIN 

DAILY, SHE IS NOT TAKING  NSAIDS.





Allergies and Home Medications


Allergies


Coded Allergies:  


     melatonin (Verified  Allergy, Unknown, 5/22/22)


     quetiapine (Verified  Adverse Reaction, Unknown, CONFUSION, 11/4/22)


   confusion





Patient Home Medication List


Acetaminophen (Tylenol 8 Hour) 650 Mg Tablet.er, 650 MG Q8H, (Reported)


   Entered as Reported by: MEE ROMAN on 7/14/23 1350


Allopurinol (Allopurinol) 100 Mg Tablet, 100 MG DAILY, (Reported)


   Entered as Reported by: MEE ROMAN on 11/1/22 0939


Amlodipine Besylate (Amlodipine Besylate) 5 Mg Tablet, 5 MG DAILY, (Reported)


   Entered as Reported by: MEE ROMAN on 11/1/22 0939


Amoxicillin/Potassium Clav (Amox Tr-K Clv 600-42.9/5 Susp) 600 Mg-42.9 Mg/5 Ml 

Susp.recon, 0 MG GT BID


   Prescribed by: VINCE URBINA on 7/25/23 1152


Ascorbic Acid (Vitamin C) 1,000 Mg Tablet, 1,000 MG DAILY, (Reported)


   Entered as Reported by: MEE ROMAN on 11/1/22 0939


Aspirin (Aspirin) 81 Mg Tab.chew, 81 MG HS, (Reported)


   Entered as Reported by: MEE ROMAN on 11/1/22 0939


Carboxymethylcellulose Sodium (Refresh Tears) 0.5 % Drops, 1 DROP OU QID, 

(Reported)


   Entered as Reported by: MEE ROMAN on 11/1/22 0939


Cholecalciferol (Vitamin D3) (Vitamin D3) 125 Mcg/Ml (5000 Unit/Ml) Drops, 0.5 

ML PO HS, (Reported)


   Entered as Reported by: MEE ROMAN on 7/14/23 1356


Diclofenac Sodium (Diclofenac Sodium) 1 % Gel..gram., 2 GM TOP QID PRN for PAIN-

BREAKTHROUGH, (Reported)


   Entered as Reported by: MEE ROMAN on 11/1/22 0939


Eyelid Cleanser Combination #5 (Ocusoft Lid Scrub) 1 Each Med..pad, 1 EACH TP 

DAILY, (Reported)


   Entered as Reported by: MEE ROMAN on 11/1/22 0939


Lidocaine (Lidocaine 5% Patch) 5 % Adh..patch, 1 EACH TP DAILY, (Reported)


   Entered as Reported by: MEE ROMAN on 7/14/23 1354


Ondansetron HCl (Ondansetron HCl) 4 Mg Tablet, 4 MG Q8H PRN for NAUSEA/VOMITING-

1ST LINE, (Reported)


   Entered as Reported by: MEE ROMAN on 7/14/23 1350


Pantoprazole Sodium (Protonix) 40 Mg Granpkt.dr, 40 MG GT BID


   Prescribed by: VINCE URBINA on 7/25/23 1152


Polyethylene Glycol 3350 ([id number]) 17 Gram/Dose Powder, 17 GM DAILY, (Reported)


   Entered as Reported by: MEE ROMAN on 7/14/23 1350


Polymyxin B Sulf/Trimethoprim (Polymyxin B-Tmp Eye Drops) 10,000 Unit-1 Mg/Ml 

Drops, 1 DROP OU TID, (Reported)


   Entered as Reported by: MEE ROMAN on 7/14/23 1350


Psyllium Husk (with Sugar) (Metamucil Packet) 3.4 Gram Powd.pack, 3.4 GM BID, 

(Reported)


   Entered as Reported by: MEE ROMAN on 7/14/23 1350


Discontinued Medications


Famotidine (Famotidine) 20 Mg Tablet, 20 MG BID, (Reported)


   Entered as Reported by: MEE ROMAN on 7/14/23 1350


Sulfamethoxazole/Trimethoprim (Sulfamethoxazole-Tmp Susp 200MG/40MG/5ML) 200 Mg-

40 Mg/5 Ml Oral.susp, 5 ML GT DAILY, (Reported)


   Entered as Reported by: MEE ROMAN on 7/14/23 1350





Past Medical-Social-Family Hx


Immunizations Up To Date


Tetanus Booster (TDap):  Unknown


PED Vaccines UTD:  No


First/Initial COVID19 Vaccinat:  YES


Second COVID19 Vaccination Brayden:  YES


Third COVID19 Vaccination Date:  YES





Seasonal Allergies


Seasonal Allergies:  No





Past Medical History


Surgery/Hospitalization HX:  


DEMENTIA, A-FIB, CAD, HTN, HYPOTHYROIDISM, HYPERLIPIDEMIAFREQUENT


FALLSSUBDURAL


HEMATOMA 05/2022 DUE TO FALL; G TUBE


Surgeries:  Yes (FEEDING TUBE)


Abdominal


Respiratory:  Yes (ASPIRATION PNEUMONIA)


Pneumonia


Currently Using CPAP:  No


Currently Using BIPAP:  No


Cardiac:  Yes (SEES DR. BUSBY UNSURE WHY)


Atrial Fibrillation, Coronary Artery Disease, High Cholesterol, Hypertension


Neurological:  Yes (SUBDURAL HEMATOMA 05/2022; SEIZURE 06/24/23)


Dementia, Traumatic Brain Injury


Reproductive Disorders:  No


Female Reproductive Disorders:  Denies


GYN History:  Menopausal


Sexually Transmitted Disease:  No


HIV/AIDS:  No


Genitourinary:  Yes


UTI-Chronic


Gastrointestinal:  Yes (ASPIRATION)


Gastroesophageal Reflux


Musculoskeletal:  Yes (POOR AMBULATION, FREQUENT FALLS; MULTIPLE FRACTURES)


Fractures, Gout


Endocrine:  Yes


Hypothyroidsim


HEENT:  Yes


Cataract


Loss of Vision:  Denies


Hearing Impairment:  Hard of Hearing


Cancer:  No


Psychosocial:  Yes


Depression


Integumentary:  No


Blood Disorders:  No


Adverse Reaction/Blood Tranf:  No





Family Medical History





Dementia


  G8 SISTER


Diabetes mellitus


  19 FATHER


No Pertinent Family Hx








LEFT HUMERUS FRACTURE


PELVIS FRACTURES/PUBIC RAMI FRACTURES


RIB FRACTURES





PT HAD WITNESSED SEIZURE ACTIVITY 06/23/23 AND WAS STARTED ON KEPPRA HERE


IN ER.





Physical Exam





Vital Signs - First Documented




















Capillary Refill :


Height: 5'4.00"


Weight: 117lbs. 8.0oz. 53.848492kf; 20.91 BMI


Method:Stated


General Appearance:  WD/WN, no apparent distress, other (PT WITH FREQUENT MOIST 

COUGH. SHE IS CONSTANTLY GROANING, AND SCREAMS WITH IV STICKS. SHE IS BUNDLED IN

FLEECE BLANKET AND HAS A THICK/FUZZY NECK PILLOW ALL AROUND HER NECK. )


Respiratory:  other (MILDLY DYSPNEIC, WITH RALES AND RHONCHI ON RIGHT)


Cardiovascular:  regular rate, rhythm


Gastrointestinal:  soft, other (FEEDING TUBE IN PLACE)


Extremities:  normal capillary refill


Neurologic/Psychiatric:  other (APHASIC, NORMAL BASELINE MENTATION / 

NEUROLOGICAL BASELINE WITH DEMENTIA. N)





Procedures/Interventions





   Suture Size:  5-0





Progress/Results/Core Measures


Suspected Sepsis


SIRS


Temperature: 


Pulse:  


Respiratory Rate: 


 


Laboratory Tests


7/28/23 19:58: White Blood Count 10.0


Blood Pressure  / 


Mean: 


 





Laboratory Tests


7/28/23 19:58: 


Creatinine 0.90, Platelet Count 367, Total Bilirubin 0.3








Results/Orders


Lab Results





Laboratory Tests








Test


 7/28/23


19:58 7/28/23


20:15 7/28/23


20:22 Range/Units


 


 


White Blood Count


 10.0 


 


 


 4.3-11.0


10^3/uL


 


Red Blood Count


 3.22 L


 


 


 3.80-5.11


10^6/uL


 


Hemoglobin 9.8 L   11.5-16.0  g/dL


 


Hematocrit 30 L   35-52  %


 


Mean Corpuscular Volume 94    80-99  fL


 


Mean Corpuscular Hemoglobin 30    25-34  pg


 


Mean Corpuscular Hemoglobin


Concent 32 


 


 


 32-36  g/dL





 


Red Cell Distribution Width 14.7 H   10.0-14.5  %


 


Platelet Count


 367 


 


 


 130-400


10^3/uL


 


Mean Platelet Volume 10.4    9.0-12.2  fL


 


Immature Granulocyte % (Auto) 1     %


 


Neutrophils (%) (Auto) 69    42-75  %


 


Lymphocytes (%) (Auto) 21    12-44  %


 


Monocytes (%) (Auto) 7    0-12  %


 


Eosinophils (%) (Auto) 2    0-10  %


 


Basophils (%) (Auto) 0    0-10  %


 


Neutrophils # (Auto)


 6.9 


 


 


 1.8-7.8


10^3/uL


 


Lymphocytes # (Auto)


 2.1 


 


 


 1.0-4.0


10^3/uL


 


Monocytes # (Auto)


 0.7 


 


 


 0.0-1.0


10^3/uL


 


Eosinophils # (Auto)


 0.2 


 


 


 0.0-0.3


10^3/uL


 


Basophils # (Auto)


 0.0 


 


 


 0.0-0.1


10^3/uL


 


Immature Granulocyte # (Auto)


 0.1 


 


 


 0.0-0.1


10^3/uL


 


Sodium Level 134 L   135-145  MMOL/L


 


Potassium Level 4.6    3.6-5.0  MMOL/L


 


Chloride Level 98      MMOL/L


 


Carbon Dioxide Level 25    21-32  MMOL/L


 


Anion Gap 11    5-14  MMOL/L


 


Blood Urea Nitrogen 40 H   7-18  MG/DL


 


Creatinine


 0.90 


 


 


 0.60-1.30


MG/DL


 


Estimat Glomerular Filtration


Rate 61 


 


 


  





 


BUN/Creatinine Ratio 44     


 


Glucose Level 118 H     MG/DL


 


Calcium Level 9.4    8.5-10.1  MG/DL


 


Corrected Calcium 10.1    8.5-10.1  MG/DL


 


Magnesium Level 2.2    1.6-2.4  MG/DL


 


Total Bilirubin 0.3    0.1-1.0  MG/DL


 


Aspartate Amino Transf


(AST/SGOT) 21 


 


 


 5-34  U/L





 


Alanine Aminotransferase


(ALT/SGPT) 31 


 


 


 0-55  U/L





 


Alkaline Phosphatase 87      U/L


 


Troponin I 0.052 H   <0.028  NG/ML


 


B-Type Natriuretic Peptide 190.2 H   <100.0  PG/ML


 


Total Protein 7.2    6.4-8.2  GM/DL


 


Albumin 3.1 L   3.2-4.5  GM/DL


 


Urine Color  YELLOW    


 


Urine Clarity  CLEAR    


 


Urine pH  7.5   5-9  


 


Urine Specific Gravity  <=1.005   1.016-1.022  


 


Urine Protein  NEGATIVE   NEGATIVE  


 


Urine Glucose (UA)  NEGATIVE   NEGATIVE  


 


Urine Ketones  NEGATIVE   NEGATIVE  


 


Urine Nitrite  NEGATIVE   NEGATIVE  


 


Urine Bilirubin  NEGATIVE   NEGATIVE  


 


Urine Urobilinogen  1.0   < = 1.0  MG/DL


 


Urine Leukocyte Esterase  NEGATIVE   NEGATIVE  


 


Urine RBC (Auto)  NEGATIVE   NEGATIVE  


 


Urine RBC  NONE    /HPF


 


Urine WBC  NONE    /HPF


 


Urine Squamous Epithelial


Cells 


 2-5 


 


  /HPF





 


Urine Crystals  NONE    /LPF


 


Urine Bacteria  NEGATIVE    /HPF


 


Urine Casts  NONE    /LPF


 


Urine Mucus  NEGATIVE    /LPF


 


Urine Culture Indicated  NO    








My Orders





Orders - WALE,CESARIO K DO


Ed Iv/Invasive Line Start (7/28/23 19:49)


Ekg Tracing (7/28/23 19:49)


O2 (7/28/23 19:49)


Monitor-Rhythm Ecg Trace Only (7/28/23 19:49)


Chest 1 View, Ap/Pa Only (7/28/23 19:49)


Bnp Compa (7/28/23 19:49)


Cbc With Automated Diff (7/28/23 19:49)


Comprehensive Metabolic Panel (7/28/23 19:49)


Magnesium (7/28/23 19:49)


Troponin I Compa (7/28/23 19:49)


Catheter(Urinary) Insert & Ass 03,15 (7/28/23 20:01)


Ua Culture If Indicated (7/28/23 20:01)


Lidocaine 2% (Urojet) (Xylocaine Urojet) (7/28/23 20:15)


Covid 19 Inhouse Test (7/28/23 20:19)


Influenza A And B By Pcr (7/28/23 20:19)


Cefepime Injection (Cefepime Injection) (7/28/23 20:45)


Blood Culture (7/28/23 20:05)


Blood Culture (7/28/23 19:58)


Vital Signs Adult Sepsis Patie Q15M (7/28/23 20:44)


O2 (7/28/23 20:44)


Remove Rings In Anticipation O (7/28/23 20:44)


Lactic Acid Analyzer (7/28/23 20:44)


Lactated Ringers (Lr 1000 Ml Iv Solution (7/28/23 20:45)





Vital Signs/I&O











 7/28/23 7/28/23 7/28/23





 19:45 19:45 19:45


 


Temp  36.9 


 


Pulse  87 


 


Resp  18 


 


B/P (MAP)  134/70 (91) 


 


Pulse Ox  97 


 


O2 Delivery Nasal Cannula Nasal Cannula Nasal Cannula


 


O2 Flow Rate 2.00 2.00 2.00





Capillary Refill :


Progress Note :  


Progress Note








ECG


Initial ECG Impression Date:  Jul 28, 2023


Initial ECG Impression Time:  19:59


Initial ECG Rate:  80


Initial ECG Rhythm:  Normal Sinus


Initial ECG Intervals:  Normal


Initial ECG Impression:  Nonspecific Changes (INFERIOR Q WAVES)


Initial ECG Comparisson:  Unchanged


Comment


INTERPRETED BY ME





Diagnostic Imaging





Comments





CXR--PER RADIOLOGIST REPORT AT 2051





FINDINGS: There is a small left pleural effusion with left


basilar airspace opacity, mildly improved since the prior exam.


There is no pneumothorax. The cardiac silhouette is stable in


size. There is aortic atherosclerosis. Bone density is diffusely


low.





IMPRESSION: Small left pleural effusion with left basilar


airspace opacity, mildly improved since 07/20/2023.


   Reviewed:  Reviewed by Me





Departure


Impression





   Primary Impression:  


   Aspiration pneumonia


   Additional Impressions:  


   PEG (percutaneous endoscopic gastrostomy) status


   CHRONIC DYSPHAGIA AND ASPIRATION


   Dementia


Disposition:  09 ADMITTED AS INPATIENT


Condition:  Stable





Admissions


Decision to Admit Reason:  Admit from ER (General)


Decision to Admit/Date:  Jul 28, 2023


Time/Decision to Admit Time:  20:45





Departure-Patient Inst.


Referrals:  


MEE HERNANDEZ DO (PCP/Family)


Primary Care Physician











CESARIO ECHEVARRIA DO                 Jul 28, 2023 20:01

## 2023-07-29 NOTE — HISTORY & PHYSICAL-HOSPITALIST
History of Present Illness


HPI/Chief Complaint


Is an 88-year-old  female known to me from recent admission who 

presented to the emergency department due to aspiration.  She has cerebral 

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  Was eating yesterday 

and started to choke prompting her evaluation in the emergency department.  She 

had an increase in her oxygen requirement.  Family reported to the ER that they 

are now interested in pursuing short-term nursing home placement for skilled 

therapies.  No family is at bedside to assist with history and patient is unable

to provide any.  All of his history is obtained from the records.


Source:  patient


Exam Limitations:  clinical condition


Date Seen


7/29/23


Time Seen by a Provider:  06:00


Attending Physician


Rigoberto Moody DO


PCP


Admitting Physician:


Zarina Gandhi MD 








Attending Physician:


Zarina Gandhi MD


Referring Physician





Date of Admission


Jul 28, 2023 at 21:51





Home Medications & Allergies


Home Medications


Reviewed patient Home Medication Reconciliation performed by pharmacy medication

reconciliations technician and/or nursing.


Patients Allergies have been reviewed.





Allergies





Allergies


Coded Allergies


  melatonin (Verified Allergy, Unknown, 5/22/22)


  quetiapine (Verified Adverse Reaction, Unknown, CONFUSION, 11/4/22)


    confusion








Past Medical-Social-Family Hx


Patient Social History


Tobacco Use?:  No


Use of E-Cig and/or Vaping dev:  No


Substance use?:  No


Alcohol Use?:  No


Pt feels they are or have been:  No





Immunizations Up To Date


Date of Influenza Vaccine:  Oct 1, 2017


First/Initial COVID19 Vaccinat:  YES


Second COVID19 Vaccination Brayden:  YES


Tetanus Booster (TDap):  Unknown


Hepatitis A:  No


Hepatitis B:  No


PED Vaccines UTD:  No


Date of Pneumonia Vaccine:  Oct 1, 2017





Seasonal Allergies


Seasonal Allergies:  No





Current Status


Pregnancy status:  No


Breastfeeding status:  No


Advance Directives:  No


Communicates:  Does Not Communicate


Primary Language:  English


Preferred Spoken Language:  English


Is interpretation needed?:  No


Sensory deficits:  Speech impairment





Past Medical History


Surgeries:  Abdominal


Pneumonia


Currently Using CPAP:  No


Currently Using BIPAP:  No


Atrial Fibrillation, Coronary Artery Disease, High Cholesterol, Hypertension


Dementia, Traumatic Brain Injury


GYN History:  Menopausal


Sexually Transmitted Disease:  No


HIV/AIDS:  No


UTI-Chronic


Gastroesophageal Reflux


Fractures, Gout


Hypothyroidsim


Cataract


Loss of Vision:  Denies


Hearing Impairment:  Hard of Hearing


Depression


Blood Disorders:  No


Adverse Reaction/Blood Tranf:  No





PMHx:


HTN


HLD





PSurgHx: none





Family Medical History





Dementia


  G8 SISTER


Diabetes mellitus


  19 FATHER


No Pertinent Family Hx








LEFT HUMERUS FRACTURE


PELVIS FRACTURES/PUBIC RAMI FRACTURES


RIB FRACTURES





PT HAD WITNESSED SEIZURE ACTIVITY 06/23/23 AND WAS STARTED ON KEPPRA HERE


IN ER.





Review of Systems


Constitutional:  see HPI





Physical Exam


Physical Exam


Vital Signs





Vital Signs - First Documented




















Capillary Refill : Less Than 3 Seconds


Height, Weight, BMI


Height: 5'4.00"


Weight: 117lbs. 8.0oz. 53.428060gy; 20.02 BMI


Method:Stated


General Appearance:  No Apparent Distress, Chronically ill, Thin


Respiratory:  Lungs Clear, No Respiratory Distress


Cardiovascular:  Regular Rate, Rhythm


Gastrointestinal:  Normal Bowel Sounds, Soft, Other (PEG)


Extremity:  No Pedal Edema


Neurologic/Psychiatric:  Alert, Disoriented





Results


Results/Procedures


Labs


Laboratory Tests


7/28/23 19:58








7/29/23 05:18








Patient resulted labs reviewed.





Assessment/Plan


Admission Diagnosis


Aspiration Pna


Admission Status:  Observation





Assessment and Plan


Aspiration pneumonia


Acute respiratory failure with hypoxia


Dysphagia


PEG


   Continue abx


   2lpm currently, wean as able


   NPO


   Continue tube feeds (4 can Jevity a day with 150mL FWF following each can)


   Family interested in pursuing NH placement upon DC





HTN


HLD


Cerebellar ataxia


Dementia


Humeral fracture


Chronic debility


Advanced age


   Chronic, stable


   Caregiver at home





DVT prophylaxis: Lovenox





Diagnosis/Problems


Diagnosis/Problems





(1) Aspiration pneumonia


Status:  Acute


(2) Cerebellar ataxia


Status:  Chronic


(3) Poor prognosis


Status:  Acute


(4) Aspiration into airway


Status:  Acute











ZARINA GANDHI MD              Jul 29, 2023 06:58

## 2023-07-29 NOTE — DIAGNOSTIC IMAGING REPORT
EXAMINATION: Chest 1 view



HISTORY: Pneumonia



COMPARISON: 07/28/2023



FINDINGS: 



Heart size and pulmonary vasculature are normal. The visualized

lungs are clear. Left lung base is not entirely visualized. There

may be small left pleural effusion and left basilar opacities

which do not appear significantly changed from prior radiograph.

No pneumothorax. The osseous structures are intact.



IMPRESSION: 



1. No acute radiographic abnormality in the nonvisualized chest.

2. Partially visualized but likely stable small left pleural

effusion with basilar opacities.



Dictated by: 



  Dictated on workstation # [id number]

## 2023-07-30 NOTE — PROGRESS NOTE - HOSPITALIST
Subjective


HPI/CC On Admission


Date Seen by Provider:  Jul 30, 2023


Is an 88-year-old  female known to me from recent admission who 

presented to the emergency department due to aspiration.  She has cerebral 

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  Was eating yesterday 

and started to choke prompting her evaluation in the emergency department.  She 

had an increase in her oxygen requirement.  Family reported to the ER that they 

are now interested in pursuing short-term nursing home placement for skilled 

therapies.  No family is at bedside to assist with history and patient is unable

to provide any.  All of his history is obtained from the records.


Subjective/Events-last exam


Pt laying in bed sleeping. No family at bedside. Patient unable to provide ROS.





Focused Exam


Lactate Level


7/28/23 19:58: Lactic Acid Level 0.85








Objective


Exam


Vital Signs





Vital Signs








  Date Time  Temp Pulse Resp B/P (MAP) Pulse Ox O2 Delivery O2 Flow Rate FiO2


 


7/30/23 11:50 37.4 103 20 147/68 (94) 92 Room Air  


 


7/30/23 07:27       0.00 





Capillary Refill : Less Than 3 Seconds


General Appearance:  No Apparent Distress, Chronically ill, Thin


Respiratory:  No Respiratory Distress, Crackles, Decreased Breath Sounds


Cardiovascular:  Regular Rate, Rhythm


Gastrointestinal:  Normal Bowel Sounds, Soft


Extremity:  No Pedal Edema


Neurologic/Psychiatric:  Alert, Disoriented





Results/Procedures


Lab


Laboratory Tests


7/30/23 05:26








Patient resulted labs reviewed.





Assessment/Plan


Assessment and Plan


Assess & Plan/Chief Complaint


Aspiration pneumonia


Acute respiratory failure with hypoxia


Dysphagia


PEG


   Continue abx


   On room air


   NPO


   Continue tube feeds (4 can Jevity a day with 150mL FWF following each can)


   Spoke with daughter Nikki about prognosis and how it appears pt is facing 

the end of her life as she is no longer able to clear her own secretions- she 

expresses understanding of the disease process and where he mom is in that 

process. She is hopeful she can rehab a little still and so is interested is 

attempting skilled therapy before initiating hospice


   Social work consulted for NH placement


   Scopolamine patch ordered





HTN


HLD


Cerebellar ataxia


Dementia


Humeral fracture


Chronic debility


Advanced age


   Chronic, stable


   Planning DC to SNF


   PT/OT/SLP





DVT prophylaxis: Lovenox





Diagnosis/Problems


Diagnosis/Problems





(1) Aspiration pneumonia


Status:  Acute


(2) Cerebellar ataxia


Status:  Chronic


(3) Poor prognosis


Status:  Acute


(4) Aspiration into airway


Status:  Acute











CIARA,VINCE M MD              Jul 30, 2023 12:21

## 2023-07-31 NOTE — PHYSICAL THERAPY EVALUATION
PT Evaluation-General


Medical Diagnosis


Admission Date


Jul 28, 2023 at 21:51


Medical Diagnosis:  aspiration pneumonia/sepsis/dementia


Onset Date:  Jul 28, 2023





Therapy Diagnosis


Therapy Diagnosis:  severe debility/weakness





Height/Weight


Height (Feet):  5


Height (Inches):  4.00


Weight (Pounds):  117


Weight (Ounces):  8.0





Precautions


Precautions/Isolations:  Aspiration, Fall Prevention, Standard Precautions





Referral


Physician:  Hiram


Reason for Referral:  Evaluation/Treatment





Medical History


Pertinent Medical History:  Arthritis, CAD, Dementia, HTN


Additional Medical History


frequent falls/multiple fractures/non verbal/PEG but still eats per report


Current History


ER secondary to SOA


Reviewed History:  Yes





Social History


caregiver/lives with family





Prior


Prior Level of Function


SCALE: Activities may be completed with or without assistive devices.





6-Indepedent-patient completes the activity by him/herself with no assistance 

from a helper.


5-Set-up or Clean-up Assistance-helper sets up or cleans up; patient completes 

activity. [place] assists only prior to or  


    following the activity.


4-Supervision or Touching Assistance-helper provides verbal cues and/or 

touching/steadying and/or contact guard assistance as patient completes 

activity. Assistance may be provided   


    throughout the activity or intermittently.


3-Partial/Moderate Assistance-helper does LESS THAN HALF the effort. [place] 

lifts, holds or supports trunk or limbs, but provides less than half the effort.


2-Substantial/Maximal Assistance-helper does MORE THAN HALF the effort. [place] 

lifts or holds trunk or limbs and provides more than half the effort.


0-Epqlstgne-rklzzr does ALL the effort. Patient does none of the effort to 

complete the activity. Or, the assistance of 2 or more helpers is required for 

the patient to complete the  


    activity.


If activity was not attempted, code reason:


7-Patient Refused.


9-Not Applicable-not attempted and the patient did not perform the activity 

before the current illness, exacerbation or injury.


10-Not Attempted due to Environmental Limitations-(lack of equipment, weather 

restraints, etc.).


88-Not Attempted due to Medical Conditions or Safety Concerns.


unable to determine due to no family/caregiver present (but per report, 

caregiver reports patient is at baseline)





PT Evaluation-Current


Objective


Patient Orientation:  Non-Verbal/Aphasic, Listless


Attachments:  Aguirre Catheter, IV





ROM/Strength


ROM Lower Extremities


bilateral LE WFL


Strength Lower Extremities


2-/5 grossly bilateral LE





Integumentary/Posture


Bladder Incontinence:  Aguirre Cath


Posture


kyphotic





Neuromuscular


(Tone, Coordination, Reflexes)


severely diminished with all





Sensory


Vision:  Unable to Assess


Hearing:  Unable to Assess





Transfers


Roll Left to Right (QC):  1


Sit to Lying (QC):  88


Lying to Sitting/Side of Bed(Q:  88





Assessment/Needs


Patient writhing with any tactile stimuli.  Patient does become increasingly 

agitated with any and all mobility.  Attempted to reposition patient with 

patient moaning.  PT will see patient for 2-4 visits to determine continues POC.


Rehab Potential:  Guarded





PT Long Term Goals


Long Term Goals


PT Long Term Goals Time Frame:  Aug 12, 2023


Roll Left & Right (QC):  2


Sit to Lying (QC):  2


Lying-Sitting on Side/Bed(QC):  2


Sit to Stand (QC):  2





PT Plan


Problem List


Problem List:  Activity Tolerance, Functional Strength, Safety, Transfer





Treatment/Plan


Treatment Plan:  Continue Plan of Care


Treatment Plan:  Bed Mobility, Functional Activity Nell, Functional Strength, 

Therapeutic Exercise, Transfers


Treatment Duration:  Aug 12, 2023


Frequency:  5 times per week


Estimated Hrs Per Day:  .25 hour per day





Time


Time In:  801


Time Out:  811


DATE:  Jul 31, 2023


Total Billed Treatment Time:  10


Total Billed Treatment


1 visit


[facility]











NICKI DERAS PT              Jul 31, 2023 08:42

## 2023-07-31 NOTE — ST DYSPHAGIA EVALUATION
Speech Evaluation-General


Medical Diagnosis


Aspiration Pneumonia/Sepsis/Dementia


Onset Date:  Jul 28, 2023





Therapy Diagnosis


Therapy Diagnosis:  Severe Oropharyngeal Dysphagia





Precautions


Precautions:  Fall, Pressure Ulcer, Aspiration


Precautions/Isolations:  Aspiration, Fall Prevention, Standard Precautions





Referral


Referring Physician:  Dr. Gandhi


Reason for Referral:  Evaluation/Treatment





Medical History


Pertinent Medical History:  Arthritis, CAD, Dementia, HTN


The patient is well known to this clinician from previous skilled speech 

language pathology dysphagia evaluations and treatment sessions. Please find the

below dates of modified barium swallow evaluations and results:





- 6/14/2022: N.P.O. recommendation following Modified Barium Swallow


- 11/1/2022: N.P.O. recommendation following Modified Barium Swallow


- 11/22/2022: N.P.O. recommendation following Modified Barium Swallow


- 1/13/2023: N.P.O. recommendation following Modified Barium Swallow


- 3/1/2023: N.P.O. recommendation following Modified Barium Swallow


- 3/28/2023: N.P.O. recommendation following outpatient skilled treatment 

session. 





The clinician has completed multiple modified barium swallow evaluations with 

the patient with a recommendation of N.P.O. Following each evaluation, the 

clinician has discussed extensively with the patient's present family member 

(son), present caregiver, or present treating speech pathologist (Rosy), the 

importance of N.P.O. to eliminate aspiration and the patient's high risk for 

aspiration if P.O. is continued. Following the most recent modified barium 

swallow (March 2023) and skilled outpatient treatment session, the patient's 

caregiver stated the patient will continue P.O. regardless of aspiration risks 

for quality of life per family. Additionally, the clinician held a conversation 

and discussion with the patient's son after the most recent modified barium 

swallow and the son stated the family has decided to allow P.O. intake 

regardless of the patient's risks.


Reviewed History:  Yes





Speech PLF/Current-Dysphagia


Prior Level of Function


Since November 2022, a recommendation of N.P.O has been provided to the patient.

Regardless of the patient's aspiration risks, the patient and family decided 

towards continued P.O. intake with PEG tube supplemental support. Per patient's 

caregiver, the patient was recently consuming three meals per day and two 

snacks. Per patient's daughter, Nikki, the patient has been N.P.O. since her 

most recent hospital discharge 7/25/2023 (re-admitted 7/28/23). 





The patient is N.P.O. at this time.





Subjective


The patient is lying in bed, eyes open, upon the clinician's entrance to her 

room. The patient does make eye contact when her name is spoken by the clinician

and tracks the clinician as she moves around the bed. The patient was re-

positioned upright in bed with assistance from the occupational therapist. 





The patient's son is present at bedside.





Oral Motor Skills


Dentition:  Natural


Ability to Follow Directions:  Unable


Oral Expression Ability:  Severe Impairment





Face


Facial Symmetry:  Symmetrical (Grossly symmetrical at rest. )





Oral-Facial Assessment


Oral-Facial Dentition:  Normal


Labial Seal Description:  Weak, Poor Coordination


Volitional Dry Swallow:  No


Voluntary Cough:  No


Can Clear Throat Volitionally:  No


Productive Cough:  No


Productive Throat Clear:  No





Dysphagia Evaluation


Consistencies Presented:  Thin Liquid (One ice chip, one half teaspoon of 

water.), Pureed (One coated teaspoon of applesauce.)


Oral Phase:  Anterior Spillage, Absent Oral Transit


Initially, a moist oral swab was attempted by the clinician. The patient does 

not open the oral cavity in attempts to manipulate the oral swab regardless of 

maximum clinician verbal encouragement and gentle tactile prompting. The patient

minimally opens the oral cavity to accept an ice chip. Following maximum verbal 

prompting and encouragement, the patient continues oral holding. The patient 

opens the oral cavity and the ice chip displays complete anterior loss. One half

teaspoon of water was accepted by the patient. Following oral holding, the 

patient transferred the bolus posteriorly. A coated teaspoon of applesauce was 

not manipulated or transferred in the oral cavity and was removed with a finger 

sweep by the clinician.


The patient demonstrated an immediate, rigorous cough following the swallow of 

the half teaspoon of water. Additional pharyngeal swallows were not triggered. 

The patient required maximum verbal cueing for limited participation. The 

patient is not safe or appropriate for oral trials at this time and the 

evaluation was ended for patient safety.


Dietary Recommendations:  NPO


Liquid Recommendations:  NPO


Dysphagia Evaluation Summary





The clinician was contacted by the patient's daughter, Nikki, for an update on 

the patient's plan of care and prognosis. The clinician reviewed the patient's 

past medical history of dysphagia as well as the evaluations and prior 

recommendations from this clinician. The patient's daughter was curious if the 

patient's swallowing "would return." The clinician stated it is unknown at this 

time however if the swallow does "return" it does not mean the swallowing 

function is "safe." As the patient has participated in eight months of skilled 

dysphagia therapy through a skilled nursing facility and home health, the 

clinician does not feel a return to function will occur. The patient's daughter 

stated she was unaware "sepsis could be because of the aspiration." The 

clinician continued to state she (the clinician) was unaware of the etiology of 

the patient's sepsis however Nikki stated "I didn't even know the two could be 

related."





The patient provided the daughter with an update of the evaluation on this date.

The clinician continued to state her professional recommendation would be N.P.O.

for patient safety. Nikki stated "it makes me look at quality of life 

differently. If what we are giving her could cause this, that's not quality of l

inez." The clinician stated quality of life decisions and discussions should be 

between family members. The clinician answered all of the daughter's questions 

within the scope of speech pathology practice and deferred any medical questions

to the treating physician. The clinician encouraged the daughter to contact her 

with any additional questions throughout the patient's stay.





(21 minutes total)





Recommendations:


- Due to the patient's extensive medical history of aspiration (including 

silent), the clinician would recommend a modified barium swallow prior to 

advancing the diet at bedside. At this time, the patient is not appropriate for 

participation in a modified barium swallow as applesauce was removed with a 

finger sweep from the oral cavity following P.O. attempts by this clinician. If 

the patient becomes appropriate, a modified barium swallow will be scheduled. 

The clinician will continue daily assessments of the patient's progress and 

swallowing function.


- N.P.O.


- Frequent and excellent oral care to reduce the transfer of oral bacteria to 

the lungs should aspiration of secretions occur (suspected).


- Speech pathology to re-assess the patient's oropharyngeal swallowing function 

daily.





The recommendations were provided to the patient's son throughout the 

assessment. Extensive recommendations were provided to the patient's daughter 

following the evaluation.





Speech Short Term Goals


Short Term Goals


Short Term Goals


1. The patient, staff, and family will follow safe swallowing strategies with 

90% accuracy, independently.


Time Frame-STG:  Two Days.





Speech Long Term Goals


Long Term Goals


1. The patient will tolerate the least restrictive diet consistency without s/s 

of suspected aspiration.


Time Frame:  Three Days.





Speech-Plan


Treatment Plan


Speech Therapy Treatment Plan:  Continue Plan of Care


Treatment Duration:  Aug 2, 2023


Frequency:  3 times per week


Estimated Hrs Per Day:  .25 hour per day


Rehab Potential:  Poor


Pt/Family Agrees to Plan:  Yes





Safety Risks/Education


Teaching Recipient:  Patient, Family


Teaching Methods:  Discussion


Response to Teaching:  Verbalize Understanding (Family.)


Education Topics Provided:  


- Results, Recommendations, Plan of Care





Time


Speech Therapy Time In:  10:10


Speech Therapy Time Out:  10:35


DATE:  Jul 31, 2023


Total Billed Time:  25


Billed Treatment Time


1, MARIA A [facility] [address]               Jul 31, 2023 12:29

## 2023-07-31 NOTE — OCCUPATIONAL THERAPY EVAL
OT Evaluation-General/PLF


Medical Diagnosis


Admission Date


Jul 28, 2023 at 21:51


Medical Diagnosis:  aspiration pneumonia/sepsis/dementia


Onset Date:  Jul 28, 2023





Therapy Diagnosis


Therapy Diagnosis:  debility





Height/Weight


Height (Feet):  5


Height (Inches):  4.00


Weight (Pounds):  117


Weight (Ounces):  8.0





Precautions


Precautions/Isolations:  Aspiration, Fall Prevention, Standard Precautions





Referral


Physician:  Hiram





Medical History


Pertinent Medical History:  Arthritis, CAD, Dementia, HTN


Additional Medical History


Recurrent falls, fractures, NPO w/ PEG, has been eating at home and choking 

noted, Debility and Dementia, End of Life cycle. Family seeking SNF before 

determining Hospice


Current History


SOA, aspiration





Social History


Unable to obtain from Patient, patient does visually track therapist in room and

responses to touch, temp and painful response when ROM is performed and position

in bed performed.





ADL-Prior Level of Function


SCALE: Activities may be completed with or without assistive devices.





6-Indepedent-patient completes the activity by him/herself with no assistance 

from a helper.


5-Set-up or Clean-up Assistance-helper sets up or cleans up; patient completes 

activity. [place] assists only prior to or  


    following the activity.


4-Supervision or Touching Assistance-helper provides verbal cues and/or 

touching/steadying and/or contact guard assistance as patient completes 

activity. Assistance may be provided   


    throughout the activity or intermittently.


3-Partial/Moderate Assistance-helper does LESS THAN HALF the effort. [place] 

lifts, holds or supports trunk or limbs, but provides less than half the effort.


2-Substantial/Maximal Assistance-helper does MORE THAN HALF the effort. [place] 

lifts or holds trunk or limbs and provides more than half the effort.


8-Eynwhnbil-kpxnvz does ALL the effort. Patient does none of the effort to 

complete the activity. Or, the assistance of 2 or more helpers is required for 

the patient to complete the  


    activity.


If activity was not attempted, code reason:


7-Patient Refused.


9-Not Applicable-not attempted and the patient did not perform the activity 

before the current illness, exacerbation or injury.


10-Not Attempted due to Environmental Limitations-(lack of equipment, weather 

restraints, etc.).


88-Not Attempted due to Medical Conditions or Safety Concerns.


Self Care:  Unknown


Functional Cognition:  Unknown (obtained form chart taht patient requires 

assistance)


Drive Self:  No





OT Current Status


Subjective


Patient visually tracks movements and makes eye contact w/ therapist, is unable 

to verbally communicate at this time





Mental Status/Objective


Patient Orientation:  Unable to Assess, Non-Verbal/Aphasic, Eyes Open


Attachments:  IV





Current


Glasses/Contacts:  No


Upper Extremity ROM


resistive however Assisted mobility sustains to perform UE Dressing/ADLS at max 

to dependent level


Upper Extremity Coordination


impaired


Upper Extremity Sensation


na/unknown


Upper Extremity Strength


unable to assess





ADL-Treatment


Eating (QC):  88 (NPO has been recommended by [facility] episodes)


Oral Hygiene (QC):  1


Shower/Bathe Self (QC):  1


Upper Body Dressing (QC):  1


Lower Body Dressing (QC):  1


On/Off Footwear (QC):  1


Toileting Hygiene (QC):  1





Education


OT Patient Education:  Correct positioning, Modified ADL techniques, Purpose of 

tx/functional activities, Reviewed precautions, Rehab process, Safety issues


Teaching Recipient:  Patient


Response to Teaching:  Unable to Return Demonstration, Unable to Comprehend





OT Long Term Goals


Long Term Goals


1=Demonstrate adherence to instructed precautions during ADL tasks.


2=Patient will verbalize/demonstrate understanding of assistive 

devices/modifications for ADL.


3=Patient will improve strength/tolerance for activity to enable patient to 

perform ADL's.





OT Education/Plan


Problem List/Assessment


Assessment:  No Skilled OT Needs ID'd





Discharge Recommendations


Plan/Recommendations:  Discontinue OT


Comment


Communication between therapists concludes family continues to provide oral 

intake despite NPO precautions and recommendation and patient inactivity





Treatment Plan/Plan of Care


Patient would benefit from OT for education, treatment and training to promote 

independence in ADL's, mobility, safety and/or upper extremity function for 

ADL's.


Plan of Care:  OTHER (EVAL only)


Treatment Duration:  Jul 31, 2023


Frequency:  1 time per week


Estimated Hrs Per Day:  .25 hour per day


Rehab Potential:  Poor





Time


Start Time:  10:00


Stop Time:  10:20


DATE:  Jul 31, 2023


Total Time Billed (hr/min):  20


Billed Treatment Time


EVM 20 min











DRINNEN,MICHELLE OT            Jul 31, 2023 10:06

## 2023-07-31 NOTE — PROGRESS NOTE
Subjective


Subjective/Events-last exam


Son at bedside this AM. No new ON events. Patient resting comfortably. Unable to

make needs known.


Review of Systems


Unable to ask due to non verbal





Focused Exam


Lactate Level


7/28/23 19:58: Lactic Acid Level 0.85





Objective


Exam


Last Set of Vital Signs





Vital Signs








  Date Time  Temp Pulse Resp B/P (MAP) Pulse Ox O2 Delivery O2 Flow Rate FiO2


 


7/31/23 12:21  92      


 


7/31/23 11:02 36.6  18 142/79 (100) 96 Room Air  


 


7/31/23 08:18       0.00 





Capillary Refill : Less Than 3 Seconds


I&O











Intake and Output 


 


 7/31/23





 00:00


 


Intake Total 200 ml


 


Output Total 4075 ml


 


Balance -3875 ml


 


 


 


Intake Oral 0 ml


 


Enteral Flush 200 ml


 


Output Urine Total 4075 ml


 


# Bowel Movements 1








General:  Other (Resting comfortable, laying in fetal position)


Lungs:  Other (diminished breath sounds, basilar crackles)


Heart:  Regular Rate


Abdomen:  Soft, No Tenderness


Extremities:  No Edema, No Tenderness/Swelling





Results/Procedures


Lab


Laboratory Tests


7/31/23 05:53: 


White Blood Count 9.7, Red Blood Count 2.97L, Hemoglobin 9.0L, Hematocrit 28L, 

Mean Corpuscular Volume 93, Mean Corpuscular Hemoglobin 30, Mean Corpuscular 

Hemoglobin Concent 33, Red Cell Distribution Width 15.0H, Platelet Count 377, M

[place] Platelet Volume 10.2, Immature Granulocyte % (Auto) 0, Neutrophils (%) 

(Auto) 63, Lymphocytes (%) (Auto) 24, Monocytes (%) (Auto) 12, Eosinophils (%) 

(Auto) 1, Basophils (%) (Auto) 0, Neutrophils # (Auto) 6.1, Lymphocytes # (Auto)

2.3, Monocytes # (Auto) 1.2H, Eosinophils # (Auto) 0.1, Basophils # (Auto) 0.0, 

Immature Granulocyte # (Auto) 0.0, Sodium Level 136, Potassium Level 4.7, 

Chloride Level 104, Carbon Dioxide Level 21, Anion Gap 11, Blood Urea Nitrogen 

23H, Creatinine 0.91, Estimat Glomerular Filtration Rate 61, BUN/Creatinine 

Ratio 25, Glucose Level 93, Calcium Level 9.5, Corrected Calcium 10.3H, Total 

Bilirubin 0.5, Aspartate Amino Transf (AST/SGOT) 23, Alanine Aminotransferase 

(ALT/SGPT) 21, Alkaline Phosphatase 76, Total Protein 7.0, Albumin 3.0L





Microbiology


7/28/23 Blood Culture - Preliminary, Resulted


          No growth





Assessment/Plan


Assessment/Plan





(1) Aspiration pneumonia


Status:  Acute


Assessment & Plan:  7/31: Oxygen wean as tolerated, discussed with family end 

stage dementia and this is a very poor prognostic factor, Family wishes to Skill

patient but she is unable to participate in skilled therapy.


Qualifiers:  


   Qualified Codes:  J69.0 - Pneumonitis due to inhalation of food and vomit


(2) Acute respiratory failure with hypoxia


Status:  Acute


(3) Severe protein-calorie malnutrition


Status:  Chronic


Assessment & Plan:  7/31: PEG tube dependent, strict NPO





(4) Dysphagia


Status:  Chronic


Assessment & Plan:  - PEG tube, NPO





(5) Feeding by G-tube


Status:  Chronic


(6) Cerebellar ataxia


Status:  Chronic











CARLY BLAND MD               Jul 31, 2023 12:56

## 2023-08-01 NOTE — DISCHARGE SUMMARY
Alonso Orlando is a 0 day old female 6 lb 13.6 oz (3107 g) infant, delivered at Gestational Age: 39w0d on 2019.    MATERNAL INFORMATION:     Age, /Para, estimated date of delivery:   Information for the patient's mother:  Loan Orlando [[id number]]   33 year old         2019, by Last Menstrual Period      steroids during pregnancy:       Prenatal Labs:  Information for the patient's mother:  Loan Orlando [[id number]]     Recent Labs   Lab 19  0315 19   HIV Antigen/Antibody Screen  --  Neg   HEP B Surface AG  --  Neg   RPR Screen  --  NR   Neisseria Gonorrhoeae by Nucleic Acid Amplification  --  Neg   Chlamydia Trachomatis by Nucleic Acid Amplification  --  Neg   Rubella Antibody IgG  --  Immune   ABO/RH(D) O POSITIVE Opos     Information for the patient's mother:  Loan Orlando [[id number]]   No results for input(s): CULT, SDES in the last 8765 hours.    GBS: Negative No antibiotics needed.    Maternal History:  Information for the patient's mother:  Loan Orlando [[id number]]     Social History     Tobacco Use   • Smoking status: Never Smoker   • Smokeless tobacco: Never Used   Substance Use Topics   • Alcohol use: Never     Frequency: Never     Information for the patient's mother:  Loan Orlando [[id number]]     Past Medical History:   Diagnosis Date   • Anemia        BIRTH HISTORY:     Delivery method: , Low Transverse [251]   Rupture date & time: 2019 2:15 AM   Date & time of birth 2019 5:43 AM   Induction:       Complications/ Risks       Placenta appearance: Intact   Cord info/complications: 3 Vessels None       Delayed cord clamping: Yes   Indications for : Prior Uterine Surgery   Presentation/position:           Forceps attempted?       Vacuum attempted?       Shoulder dystocia?                            INFORMATION   Resuscitation:     Observed  Discharge Summary


Reconcile Patient Problems


Problems Reviewed?:  Yes





Hospital Course


Hospital Course


Date of Admission: Jul 28, 2023 at 21:51 


Admission Diagnosis :  





Family Physician/Provider: Rigoberto Moody DO  





Date of Discharge: 8/1/23 


Discharge Diagnosis: 


Aspiration PNA


Acute Respiratory failure with hypoxia


Severe PEM


Dyshagia


G-Tube dependent


Cerebellar Ataxia








Hospital Course:


89 yo F admitted with aspiration PNA with severe dysphagia and PEM. Patient has 

end stage dementia and now having associated aspiration PNA. Patient stabilized 

with antibiotics. Family is wishing to pursue SNF at this time but understand 

that this will likely reoccur and patient will continue to have decline as 

natural progression of disease. Patient with poor prognosis.








Labs and Pending Lab Test:


Laboratory Tests


8/1/23 05:22: 


White Blood Count 8.3, Red Blood Count 2.61L, Hemoglobin 7.9L, Hematocrit 24L, 

Mean Corpuscular Volume 92, Mean Corpuscular Hemoglobin 30, Mean Corpuscular 

Hemoglobin Concent 33, Red Cell Distribution Width 14.8H, Platelet Count 332, 

Mean Platelet Volume 10.0, Immature Granulocyte % (Auto) 0, Neutrophils (%) 

(Auto) 66, Lymphocytes (%) (Auto) 21, Monocytes (%) (Auto) 12, Eosinophils (%) 

(Auto) 1, Basophils (%) (Auto) 0, Neutrophils # (Auto) 5.4, Lymphocytes # (Auto)

1.7, Monocytes # (Auto) 1.0, Eosinophils # (Auto) 0.1, Basophils # (Auto) 0.0, 

Immature Granulocyte # (Auto) 0.0, Sodium Level 134L, Potassium Level 4.2, 

Chloride Level 106, Carbon Dioxide Level 18L, Anion Gap 10, Blood Urea Nitrogen 

27H, Creatinine 0.82, Estimat Glomerular Filtration Rate 69, BUN/Creatinine 

Ratio 33, Glucose Level 114H, Calcium Level 8.8, Corrected Calcium 9.8, Total 

Bilirubin 0.4, Aspartate Amino Transf (AST/SGOT) 22, Alanine Aminotransferase 

(ALT/SGPT) 23, Alkaline Phosphatase 79, Total Protein 6.3L, Albumin 2.8L





Microbiology


7/28/23 Blood Culture - Preliminary, Resulted


          No growth





Home Meds


Active


Reported


Protonix (Pantoprazole Sodium) 40 Mg Granpkt.dr 40 Mg PEG 0930,2130


     RINSE WITH 10ML OF APPLE JUICE


Constulose (Lactulose) 10 Gram/15 Ml Solution 15 Ml PEG BID PRN


Acetaminophen 325 Mg Tablet 650 Mg PEG 0000,0800,1600


Vitamin D3 (Cholecalciferol (Vitamin D3)) 125 Mcg/Ml (5000 Unit/Ml) Drops 0.5 Ml

PO HS


Lidocaine 5% Patch (Lidocaine) 5 % Adh..patch 1 Each TP DAILY


     APPLY TO UPPER LEFT ARM


Metamucil Packet (Psyllium Husk (with Sugar)) 3.4 Gram Powd.pack 3.4 Gm  BID


Ondansetron HCl 4 Mg Tablet 4 Mg  Q8H PRN


Mag2922 (Polyethylene Glycol 3350) 17 Gram/Dose Powder 17 Gm  DAILY


Polymyxin B-Tmp Eye Drops (Polymyxin B Sulf/Trimethoprim) 10,000 Unit-1 Mg/Ml 

Drops 1 Drop OU TID


Ocusoft Lid Scrub (Eyelid Cleanser Combination #5) 1 Each Med..pad 1 Each TP 

DAILY


Vitamin C (Ascorbic Acid) 1,000 Mg Tablet 1,000 Mg  DAILY


Allopurinol 100 Mg Tablet 100 Mg  DAILY


Diclofenac Sodium 1 % Gel..gram. 2 Gm TOP QID PRN


     APPLY TO AFFECTED AREA


Refresh Tears (Carboxymethylcellulose Sodium) 0.5 % Drops 1 Drop OU QID


Aspirin 81 Mg Tab.chew 81 Mg  HS


Amlodipine Besylate 5 Mg Tablet 5 Mg  DAILY


     HOLD IF SBP <100 OR PULSE <60


Skilled NF Admit to:  [facility]


Certification (SNF)


I certify that SNF services are required to be given on an inpatient basis 

because of the above named patient's need for skilled nursing care on a 

continuing basis for the conditions(s) for which he/she was receiving inpatient 

hospital services prior to his/her transfer to the SNF.


Skilled Nursing Facility Order:  Nursing Services, Occupational Ther-Evaluate & 

Treat, Physical Therapy-Evaluate & Treat, Speech Language-Evaluate & Treat


Oxygen Delivery Method:  Room Air


Discharge Diet:  Other Diet (NPO)


Daily Activity as Tolerated:  Yes


Resuscitation Status:  Do Not Resuscitate


New & Resume Previous Orders


Jevity 1.5, 4 cans daily


Flush 100 mL after each feed


Flush 150 between each feed


Carly Cox 


Aug 1, 2023 


10:38





Discharge Physical Exam


General:  Other (Awake, Disoriented x 3, unable to make needs known)


Lungs:  Other (Basilar crackles, normal work of breathing at discharge)


Heart:  Regular Rate


Abdomen:  Soft, No Tenderness


Extremities:  No Edema, No Tenderness/Swelling











CARLY COX MD                Aug 1, 2023 10:45 anomalies:           APGARS  One minute Five minutes   Skin color: 1  1    Heart rate: 2  2     Reflex: 2  2    Muscle tone: 2  2    Breathin  2    Totals:   9  9      Birth Measurements:  Weight: 6 lb 13.6 oz (3107 g)  Length: 18.23\"  Head circumference: 35 cm     Labs:  Cord Blood Evaluation:  Recent Labs   Lab 19  0543   ABR O POSITIVE   DATANTIIGG NEGATIVE     Blood gases sent? Yes   Cord pH No results found    PHYSICAL EXAM   VITALS:   Visit Vitals  Pulse 146   Temp 98.1 °F (36.7 °C) (Axillary)   Resp 48   Ht 18.25\" (46.4 cm)   Wt 3107 g   HC 35 cm (13.78\") Comment: Filed from Delivery Summary   BMI 14.46 kg/m²     Intake/Output       700 -  - 659          Urine Occurrence  1 x    Stool Occurrence  1 x          GENERAL: alert,awake  SKIN: The color of the skin is pink.There is no rash.  HEAD: The head is atraumatic and normocephalic. The anterior fontanelle is open and flat.  EYES: The conjunctivae appear normal with neither icterus nor subconjunctival hemorrhage.   Red Reflex normal bilaterally  EARS: Pinnae normal.  NOSE: There is no nasal flaring, nares patent bilaterally.  THROAT:  The oropharynx is normal.  There is no cleft of the palate.  NECK: Clavicles without crepitus.  TRUNK AND THORAX: There are no lesions on the trunk; there is no dimple over the presacral area. There are no retractions. Symmetric  LUNGS: The lung fields are clear to auscultation.  HEART: The precordium is quiet. The heart rhythm is grossly regular. S1 and S2 are normal. There are no murmurs. Normal femoral pulses bilaterally  ABDOMEN: The umbilical cord stump is normal. There is not an umbilical hernia. The abdomen is flat and soft.   GENITALIA: Normal external genitalia  RECTAL: anus patent  EXTREMITIES: Moving all 4 extremities. The hip exam is normal  . There are no hip clicks or clunks.  NEUROLOGIC: + [place] +palmar/plantar grasp       ASSESSMENT   female Eunice, AGA born to a    Information for the patient's mother:  Loan Orlando [[id number]]   33 year old         2019, by Last Menstrual Period  .  GBS negative  No risk factors.     Patient Active Problem List   Diagnosis   • Liveborn infant, born in hospital, delivered by          PLAN     Plan:   - Admit to Nursery with Routine Nursery care following protocols  - Cord Blood type if indicated  - Hep B, Vit K, erythromycin post delivery  - Frequent Feeding Q2 hours  - Monitory daily weights, I/O closely   - Follow up 24 hour CCHD, Hearing and Bilirubin screening   - Circumcision (if applicable and desired) to be performed prior to discharged    Discussed plan with family. All questions answered.     Signed by: Tonja Paz DO Pediatric Hospitalist  2019

## 2023-08-01 NOTE — SPEECH THERAPY DAILY NOTE
Speech Daily Progress Note


Subjective


Date Seen by Provider:  Aug 1, 2023


Time Seen by Provider:  09:52


The patient was lying in bed, eyes opened, upon entrance to her room by the 

clinician. The patient made eye contact and attempted verbalizations following a

verbal greeting from the clinician. The patient appears with increased alertness

on this date. The head of bed was elevated for safe swallowing.





The patient's son is at bedside. During a conversation with the patient's 

daughter yesterday, Nikki (daughter) stated she felt there was miscommunication

regarding the patient's P.O. intake as it was reported to the admitting staff 

the patient "choked on something." Per Nikki, the patient has been strict NPO 

since discharge on 7/25/2023. On today's date, the patient's son provided 

information to the patient regarding providing the patient with a banana 

following discharge therefore the patient's intake status is unknown to this 

clinician due to conflicting reports from caregivers.





Objective


Initially, the patient refused P.O. trials presented, turning her head and 

pursing her lips in response to tactile stimulation of the spoon on her lips. 

With maximum encouragement from the clinician and the patient's son, the patient

accepted half teaspoons of water (two) with oral holding and immediate rigorous 

coughing following. The patient accepted four coated teaspoons of applesauce 

over an extended time period. Following the fourth teaspoon, rigorous coughing 

was displayed. The patient continuously attempted refusal of P.O. trials, 

physically turning her head away from the clinician and covering her lips with 

her neck pillow when offered. Due to the overt s/s of suspected aspiration with 

the limited P.O. trials, oral holding behaviors, and patient refusal, the 

session was ended for patient safety.





The patient was unable to follow simple one-step commands for completion of 

dysphagia exercises regardless of maximum clinician cueing, including a direct 

model.





Continue plan of care.





Assessment


Assessment Current Status:  Poor Progress





Treatment Plan


Continue Plan of Care





Speech Short Term Goals


Short Term Goals


Short Term Goals


1. The patient, staff, and family will follow safe swallowing strategies with 

90% accuracy, independently.


Time Frame-STG:  Two Days.





Speech Long Term Goals


Long Term Goals


1. The patient will tolerate the least restrictive diet consistency without s/s 

of suspected aspiration.


Time Frame:  Three Days.





Speech-Plan


Treatment Plan


Speech Therapy Treatment Plan:  Continue Plan of Care


Treatment Duration:  Aug 2, 2023


Frequency:  3 times per week


Estimated Hrs Per Day:  .25 hour per day


Rehab Potential:  Poor


Pt/Family Agrees to Plan:  Yes





Safety Risks/Education


Teaching Recipient:  Patient, Family


Teaching Methods:  Discussion


Response to Teaching:  Unable to Comprehend


Education Topics Provided:  


Results, Recommendations, Plan of Care





Time


Speech Therapy Time In:  09:52


Speech Therapy Time Out:  10:10


DATE:  Aug 1, 2023


Total Billed Time:  18


Billed Treatment Time


1, JESSE CAPUTO                Aug 1, 2023 10:24

## 2023-11-10 NOTE — DIAGNOSTIC IMAGING REPORT
INDICATION: Shortness of air, fever.  



TECHNIQUE: Single view chest 7:09 PM.



CORRELATION STUDY: 07/29/2023.



FINDINGS: Heart size stable. Vasculature is increased from prior.

Perihilar, basilar and right upper lobe opacities are present,

appearing changed from prior. Suspect left pleural effusion.

Ununited left proximal humerus fracture, unchanged.



IMPRESSION: 

1. Heart size stable with vasculature appearing increased suspect

for at least component of edema.

2. Scattered bilateral pulmonary opacities. May reflect

asymmetric areas of edema with infiltrate or even aspiration not

excluded. Most pronounced at the left lung base. 



Dictated by: 



  Dictated on workstation # [id number]

## 2023-11-10 NOTE — DIAGNOSTIC IMAGING REPORT
PROCEDURE: CT chest, abdomen, and pelvis with contrast.



TECHNIQUE: Multiple contiguous axial images were obtained through

the chest, abdomen, and pelvis after the administration of

intravenous contrast. Auto Exposure Controls were utilized during

the CT exam to meet ALARA standards for radiation dose reduction.





INDICATION: Dyspnea, hypoxia, coffee-ground contents of feeding

tube, GI bleed.



COMPARISON: 05/07/2023.



FINDINGS:



CT CHEST: The heart is mildly large. There is mild pericardial

fluid without pino effusion seen. There is calcific

atherosclerosis of the aorta. The ascending aorta is mildly

ectatic. There is a small hiatal hernia. There is coronary

atherosclerosis. No axillary adenopathy is seen. There is

airspace consolidation in the left upper lobe and the left lower

lobe at the lung base. This is new since the prior study. There

is motion artifact throughout the lungs with dependent

atelectasis. No central endobronchial lesion is seen. No acute

osseous abnormality is seen. There is an old nonunited fracture

of the proximal left humerus.



CT ABDOMEN/PELVIS: The liver demonstrates no focal lesion. There

is motion artifact. There is a calcified granuloma in the spleen.

The pancreas is mildly atrophic but otherwise unremarkable. The

adrenal glands appear normal. The kidneys are mildly atrophic

with no hydronephrosis or enhancing lesion seen. The bladder is

mildly thickened with a Aguirre catheter in place.



There is a PEG tube in the stomach. No bowel distention is seen.

The appendix appears to be normal. The colon is unremarkable. No

contrast extravasation is seen in the stomach. No free fluid or

free air is seen. No acute osseous abnormality is seen. There are

old posttraumatic deformities in the pelvis.



IMPRESSION:

1. Consolidation in the left lung, concerning for infection.

2. Mild thickening of the urinary bladder, please correlate

clinically for findings of infection. No other acute abnormality

is seen in the abdomen or pelvis. 



Dictated by: 



  Dictated on workstation # [id number]

## 2023-11-10 NOTE — ED GENERAL
General


Chief Complaint:  Respiratory Problems


Stated Complaint:  ELEVATED WHITE BLOOD COUNT


Nursing Triage Note:  


PT ARRIVED PER EMS, PT IS FROM [facility]. PT HAS HAD SOA TODAY, PT HAS 


PEG TUBE AND WHEN RESIDUAL CHECKED, PT HAS COFFEE GROUND RESULTS. PT HAS HAD LOW




GRADE TEMP TODAY. PT IS NON VERBAL. PT ALSO HAD WBC OF 22,000


Source of Information:  EMS, Nursing Home Records


Exam Limitations:  Other (PT IS NON-VERBAL)





History of Present Illness


Date Seen by Provider:  Nov 10, 2023


Time Seen by Provider:  18:08


Initial Comments


PT ARRIVES VIA EMS FROM [facility]


PT IS NON-VERBAL


PT HAS FEEDING TUBE AND HAD SOME COFFEE-GROUND STOMACH CONTENTS TODAY


PT HAS BEEN SHORT OF BREATH TODAY--PT ARRIVES ON 4L O2/NC


EMS REPORT TEMP 


PT HAD LAB DONE TODAY AND WBC WAS 22,000, SO SENT HERE





NO OTHER INFORMATION IS OBTAINABLE AT THIS TIME





PT IS DNR.





PCP: DR. HUMMEL





Allergies and Home Medications


Allergies


Coded Allergies:  


     melatonin (Verified  Allergy, Unknown, 5/22/22)


     nitrofurantoin (Verified  Allergy, Unknown, 7/31/23)


     quetiapine (Verified  Adverse Reaction, Unknown, CONFUSION, 11/4/22)


   confusion





Patient Home Medication List


Acetaminophen (Acetaminophen) 325 Mg Tablet, 650 MG PEG 0000,0800,1600, (Report

ed)


   Entered as Reported by: MEE ROMAN on 7/31/23 0909


Allopurinol (Allopurinol) 100 Mg Tablet, 100 MG DAILY, (Reported)


   Entered as Reported by: MEE ROMAN on 11/1/22 0939


Amlodipine Besylate (Amlodipine Besylate) 5 Mg Tablet, 5 MG DAILY, (Reported)


   Entered as Reported by: MEE ROMAN on 11/1/22 0939


Amoxicillin/Potassium Clav (Amox Tr-K Clv 500-125 mg Tab) 500 Mg-125 Mg Tablet, 

1 EACH GT BID


   Prescribed by: CARLY BALND on 8/1/23 1048


Ascorbic Acid (Vitamin C) 1,000 Mg Tablet, 1,000 MG DAILY, (Reported)


   Entered as Reported by: MEE ROMAN on 11/1/22 0939


Aspirin (Aspirin) 81 Mg Tab.chew, 81 MG HS, (Reported)


   Entered as Reported by: MEE ROMAN on 11/1/22 0939


Carboxymethylcellulose Sodium (Refresh Tears) 0.5 % Drops, 1 DROP OU QID, 

(Reported)


   Entered as Reported by: MEE ROMAN on 11/1/22 0939


Cholecalciferol (Vitamin D3) (Vitamin D3) 125 Mcg/Ml (5000 Unit/Ml) Drops, 0.5 

ML PO HS, (Reported)


   Entered as Reported by: MEE ROMAN on 7/14/23 1356


Eyelid Cleanser Combination #5 (Ocusoft Lid Scrub) 1 Each Med..pad, 1 EACH TP 

DAILY, (Reported)


   Entered as Reported by: MEE ROMAN on 11/1/22 0939


Lactulose (Constulose) 10 Gram/15 Ml Solution, 15 ML PEG BID PRN for 

CONSTIPATION-3RD LINE, (Reported)


   Entered as Reported by: MEE ROMAN on 7/31/23 0909


Lidocaine (Lidocaine 5% Patch) 5 % Adh..patch, 1 EACH TP DAILY, (Reported)


   Entered as Reported by: MEE ROMAN on 7/14/23 1354


Ondansetron HCl (Ondansetron HCl) 4 Mg Tablet, 4 MG Q8H PRN for NAUSEA/VOMITING-

1ST LINE, (Reported)


   Entered as Reported by: MEE ROMAN on 7/14/23 1350


Pantoprazole Sodium (Protonix) 40 Mg Granpkt.dr, 40 MG PEG 0930,2130, (Reported)


   Entered as Reported by: MEE ROMAN on 7/31/23 0918


Polyethylene Glycol 3350 ([id number]) 17 Gram/Dose Powder, 17 GM DAILY, (Reported)


   Entered as Reported by: MEE ROMAN on 7/14/23 1350


Psyllium Husk (with Sugar) (Metamucil Packet) 3.4 Gram Powd.pack, 3.4 GM BID, 

(Reported)


   Entered as Reported by: MEE ROMAN on 7/14/23 1350





Past Medical-Social-Family Hx


Immunizations Up To Date


Tetanus Booster (TDap):  Unknown


PED Vaccines UTD:  No


First/Initial COVID19 Vaccinat:  YES


Second COVID19 Vaccination Brayden:  YES


Third COVID19 Vaccination Date:  YES





Seasonal Allergies


Seasonal Allergies:  No





Past Medical History


Surgery/Hospitalization HX:  


DEMENTIA, A-FIB, CAD, HTN, HYPOTHYROIDISM, HYPERLIPIDEMIA FREQUENT FALLS 


SUBDURAL HEMATOMA 05/2022 DUE TO FALL; G TUBE


Surgeries:  Yes (FEEDING TUBE)


Abdominal


Respiratory:  Yes (ASPIRATION PNEUMONIA)


Pneumonia


Currently Using CPAP:  No


Currently Using BIPAP:  No


Cardiac:  Yes (SEES DR. BUSBY UNSURE WHY)


Atrial Fibrillation, Coronary Artery Disease, High Cholesterol, Hypertension


Neurological:  Yes (SUBDURAL HEMATOMA 05/2022; SEIZURE 06/24/23)


Dementia, Traumatic Brain Injury


Reproductive Disorders:  No


Female Reproductive Disorders:  Denies


GYN History:  Menopausal


Sexually Transmitted Disease:  No


HIV/AIDS:  No


Genitourinary:  Yes


UTI-Chronic


Gastrointestinal:  Yes (ASPIRATION)


Gastroesophageal Reflux


Musculoskeletal:  Yes (POOR AMBULATION, FREQUENT FALLS; MULTIPLE FRACTURES)


Fractures, Gout


Endocrine:  Yes


Hypothyroidsim


HEENT:  Yes


Cataract


Loss of Vision:  Denies


Hearing Impairment:  Hard of Hearing


Cancer:  No


Psychosocial:  Yes


Depression


Integumentary:  No


Blood Disorders:  No


Adverse Reaction/Blood Tranf:  No





Family Medical History





Dementia


  G8 SISTER


Diabetes mellitus


  19 FATHER


No Pertinent Family Hx








LEFT HUMERUS FRACTURE


PELVIS FRACTURES/PUBIC RAMI FRACTURES


RIB FRACTURES





PT HAD WITNESSED SEIZURE ACTIVITY 06/23/23 AND WAS STARTED ON KEPPRA HERE


IN ER.





Physical Exam


Vital Signs





Vital Signs - First Documented








 11/10/23 11/10/23





 18:05 18:14


 


Temp 40.3 


 


Pulse 107 


 


Resp 18 


 


B/P (MAP) 126/80 (95) 


 


Pulse Ox  92


 


O2 Delivery Nasal Cannula 


 


O2 Flow Rate 4.00 





Capillary Refill : Less Than 3 Seconds


Height, Weight, BMI


Height: 5'4.00"


Weight: 117lbs. 8.0oz. [id number]; 16.00 BMI


Method:Stated


Gastrointestinal:  Other (FEEDING TUBE --TUBING IS BROKEN OFF)


Genital/Rectal:  Other (MARRUFO WITHOUT ANY URINE IN BAG OR IN TUBING)





Focused Exam


Lactate Level


11/10/23 18:10: Lactic Acid Level 1.76





Lactic Acid Level





Laboratory Tests








Test


 11/10/23


18:10


 


Lactic Acid Level


 1.76 MMOL/L


(0.50-2.00)











Procedures/Interventions





   Suture Size:  5-0





Progress/Results/Core Measures


Suspected Sepsis


SIRS


Temperature: 


Pulse: 107 


Respiratory Rate: 18


 


Laboratory Tests


11/10/23 18:10: White Blood Count 22.1H


Blood Pressure 126 /80 


Mean: 95


 





11/10/23 18:10: Lactic Acid Level 1.76


Laboratory Tests


11/10/23 18:10: 


Creatinine 1.38H, INR Comment 1.2, Platelet Count 306, Total Bilirubin 0.5








Results/Orders


Lab Results





Laboratory Tests








Test


 11/10/23


18:10 11/10/23


18:39 11/10/23


19:36 Range/Units


 


 


White Blood Count


 22.1 H


 


 


 4.3-11.0


10^3/uL


 


Red Blood Count


 3.54 L


 


 


 3.80-5.11


10^6/uL


 


Hemoglobin 10.5 L   11.5-16.0  g/dL


 


Hematocrit 32 L   35-52  %


 


Mean Corpuscular Volume 91    80-99  fL


 


Mean Corpuscular Hemoglobin 30    25-34  pg


 


Mean Corpuscular Hemoglobin


Concent 33 


 


 


 32-36  g/dL





 


Red Cell Distribution Width 17.0 H   10.0-14.5  %


 


Platelet Count


 306 


 


 


 130-400


10^3/uL


 


Mean Platelet Volume 9.5    9.0-12.2  fL


 


Immature Granulocyte % (Auto) 1     %


 


Neutrophils (%) (Auto) 85 H   42-75  %


 


Lymphocytes (%) (Auto) 8 L   12-44  %


 


Monocytes (%) (Auto) 7    0-12  %


 


Eosinophils (%) (Auto) 0    0-10  %


 


Basophils (%) (Auto) 0    0-10  %


 


Neutrophils # (Auto)


 18.7 H


 


 


 1.8-7.8


10^3/uL


 


Lymphocytes # (Auto)


 1.8 


 


 


 1.0-4.0


10^3/uL


 


Monocytes # (Auto)


 1.5 H


 


 


 0.0-1.0


10^3/uL


 


Eosinophils # (Auto)


 0.0 


 


 


 0.0-0.3


10^3/uL


 


Basophils # (Auto)


 0.0 


 


 


 0.0-0.1


10^3/uL


 


Immature Granulocyte # (Auto)


 0.1 


 


 


 0.0-0.1


10^3/uL


 


Neutrophils % (Manual) 86     %


 


Lymphocytes % (Manual) 8     %


 


Monocytes % (Manual) 4     %


 


Band Neutrophils 2     %


 


Anisocytosis SLIGHT     


 


Prothrombin Time 15.4 H   12.2-14.7  SEC


 


INR Comment 1.2    0.8-1.4  


 


Activated Partial


Thromboplast Time 32 


 


 


 24-35  SEC





 


Sodium Level 135    135-145  MMOL/L


 


Potassium Level 4.7    3.6-5.0  MMOL/L


 


Chloride Level 96 L     MMOL/L


 


Carbon Dioxide Level 26    21-32  MMOL/L


 


Anion Gap 13    5-14  MMOL/L


 


Blood Urea Nitrogen 44 H   7-18  MG/DL


 


Creatinine


 1.38 H


 


 


 0.60-1.30


MG/DL


 


Estimat Glomerular Filtration


Rate 37 


 


 


  





 


BUN/Creatinine Ratio 32     


 


Glucose Level 137 H     MG/DL


 


Lactic Acid Level


 1.76 


 


 


 0.50-2.00


MMOL/L


 


Calcium Level 10.6 H   8.5-10.1  MG/DL


 


Corrected Calcium 10.9 H   8.5-10.1  MG/DL


 


Magnesium Level 1.8    1.6-2.4  MG/DL


 


Total Bilirubin 0.5    0.1-1.0  MG/DL


 


Aspartate Amino Transf


(AST/SGOT) 21 


 


 


 5-34  U/L





 


Alanine Aminotransferase


(ALT/SGPT) 19 


 


 


 0-55  U/L





 


Alkaline Phosphatase 84      U/L


 


Total Protein 7.5    6.4-8.2  GM/DL


 


Albumin 3.6    3.2-4.5  GM/DL


 


Amylase Level 42      U/L


 


Lipase 6 L   8-78  U/L


 


Influenza Type A (RT-PCR)  Not Detected   Not Detecte  


 


Influenza Type B (RT-PCR)  Not Detected   Not Detecte  


 


SARS-CoV-2 RNA (RT-PCR)  Not Detected   Not Detecte  


 


Urine Color   YELLOW   


 


Urine Clarity   CLOUDY   


 


Urine pH   7.0  5-9  


 


Urine Specific Gravity   1.025 H 1.016-1.022  


 


Urine Protein   3+ H NEGATIVE  


 


Urine Glucose (UA)   NEGATIVE  NEGATIVE  


 


Urine Ketones   NEGATIVE  NEGATIVE  


 


Urine Nitrite   NEGATIVE  NEGATIVE  


 


Urine Bilirubin   NEGATIVE  NEGATIVE  


 


Urine Urobilinogen   0.2  < = 1.0  MG/DL


 


Urine Leukocyte Esterase   3+ H NEGATIVE  


 


Urine RBC (Auto)   2+ H NEGATIVE  


 


Urine RBC    H  /HPF


 


Urine WBC   TNTC H  /HPF


 


Urine Squamous Epithelial


Cells 


 


 NONE 


  /HPF





 


Urine Crystals   NONE   /LPF


 


Urine Bacteria   LARGE H  /HPF


 


Urine Casts   NONE   /LPF


 


Urine Mucus   NEGATIVE   /LPF


 


Urine Culture Indicated


 


 


 CULTURE


PENDING  











My Orders





Orders - CESARIO ECHEVARRIA DO


Ed Iv/Invasive Line Start (11/10/23 18:08)


O2 (11/10/23 18:08)


Monitor-Rhythm Ecg Trace Only (11/10/23 18:08)


Amylase (11/10/23 18:08)


Comprehensive Metabolic Panel (11/10/23 18:08)


Lipase (11/10/23 18:08)


Magnesium (11/10/23 18:08)


Ua Culture If Indicated (11/10/23 18:08)


Ed Iv/Invasive Line Start (11/10/23 18:08)


Ns Iv 1000 Ml (Ns Iv 1000 Ml) (11/10/23 18:15)


Ondansetron Injection (Ondansetron  Inj (11/10/23 18:15)


Pantoprazole Injection (Pantoprazole Inj (11/10/23 18:15)


Cbc And Automated Diff (11/10/23 18:12)


Blood Culture (11/10/23 18:12)


Sputum Culture (11/10/23 18:12)


Urine Culture (11/10/23 18:12)


Protime With Inr (11/10/23 18:12)


Chest 1 View, Ap/Pa Only (11/10/23 18:12)


Acetaminophen  Tablet (Acetaminophen  Ta (11/10/23 18:15)


Ed Iv/Invasive Line Start (11/10/23 18:12)


Ed Iv/Invasive Line Start (11/10/23 18:12)


Vital Signs Adult Sepsis Patie Q15M (11/10/23 18:12)


O2 (11/10/23 18:12)


Remove Rings In Anticipation O (11/10/23 18:12)


Lactic Acid Analyzer (11/10/23 18:12)


Ns Iv 1000 Ml (Ns Iv 1000 Ml) (11/10/23 18:15)


Piperacillin/Tazobactam (Piperacillin/Ta (11/10/23 18:15)


Ct Chest/Abdomen/Pelvis W (11/10/23 18:18)


Manual Differential (11/10/23 18:10)


Covid 19 Inhouse Test (11/10/23 18:34)


Influenza A And B By Pcr (11/10/23 18:34)


Partial Thromboplastin Time (11/10/23 18:58)


Ed Iv/Invasive Line Start (11/10/23 19:17)


Lactated Ringers 1,000 Ml (Lactated Ring (11/10/23 19:30)


Iohexol Injection (Omnipaque 350 Mg/Ml 1 (11/10/23 19:45)


Ns (Ivpb) 100 Ml (Sodium Chloride 0.9% 1 (11/10/23 19:45)





Medications Given in ED





Current Medications








 Medications  Dose


 Ordered  Sig/Familia


 Route  Start Time


 Stop Time Status Last Admin


Dose Admin


 


 Acetaminophen  1,000 mg  ONCE  ONCE


 FEEDING  11/10/23 18:15


 11/10/23 18:16 DC 11/10/23 18:25


1,000 MG


 


 Iohexol  100 ml  ONCE  ONCE


 IV  11/10/23 19:45


 11/10/23 19:46 DC 11/10/23 19:38


40 ML


 


 Lactated Ringer's  1,000 ml @ 


 0 mls/hr  Q0M ONCE


 IV  11/10/23 19:30


 11/10/23 19:31 DC 11/10/23 19:37


0 MLS/HR


 


 Ondansetron HCl  4 mg  ONCE  ONCE


 IVP  11/10/23 18:15


 11/10/23 18:16 DC 11/10/23 18:19


4 MG


 


 Pantoprazole  80 mg  ONCE  ONCE


 IV  11/10/23 18:15


 11/10/23 18:16 DC 11/10/23 18:19


80 MG


 


 Piperacillin Sod/


 Tazobactam Sod


 4.5 gm/Sodium


 Chloride  100 ml @ 


 200 mls/hr  ONCE  ONCE


 IV  11/10/23 18:15


 11/10/23 18:44 DC 11/10/23 18:25


200 MLS/HR


 


 Sodium Chloride  100 ml  ONCE  ONCE


 IV  11/10/23 19:45


 11/10/23 19:46 DC 11/10/23 19:38


70 ML








Vital Signs/I&O











 11/10/23 11/10/23 11/10/23





 18:05 18:05 18:14


 


Temp 40.3 36.9 


 


Pulse  107 


 


Resp  18 


 


B/P (MAP)  126/80 (95) 


 


Pulse Ox   92


 


O2 Delivery  Nasal Cannula Nasal Cannula


 


O2 Flow Rate  4.00 4.00





Capillary Refill : Less Than 3 Seconds








Blood Pressure Mean:                    95








Progress Note :  


Progress Note


VTIALS ON ARRIVAL: TEMP 40.3=104.6, , RR 18, /80, O2 SAT 92% ON 

4L/NC





SEPSIS PROTOCOL INITIATED





MARRUFO CATHETER REMOVED AND REPLACED WITH IMMEDIATE RETURN OF LARGE AMOUNT OF 

URINE. MARRUFO TUBING CLOGGED WITH DEBRIS





FEEDING TUBE--EXTERNAL TUBING IS BROKEN OFF AND HAS BEEN TIED IN A KNOT PRIOR TO

PT'S ARRIVAL TO ER


THERE IS BLACK DEBRIS AROUND THE STOMA





GIVEN:


-IV FLUIDS


-ZOFRAN


-PROTONIX


-ZOSYN


-TYLENOL FOR FEVER





LABS: 


-CBC WITH WBC 22.1, HGB 10.5, ,000


-CMP--ELECTROLYTES NORMAL, BUN 44, CR 1.38, , LFT'S NORMAL


-MG NORMAL


-AMYLASE/LIPASE NORMAL


-PT/PTT/INR PT 15.4, OTHERWISE NORMAL


-LACTIC ACID 1.76


-UA


-COVID/FLU NEGATIVE





Diagnostic Imaging





Comments


CXR--PER RADIOLOGIST REPORT AT 1957


CORRELATION STUDY: 07/29/2023.





FINDINGS: Heart size stable. Vasculature is increased from prior.


Perihilar, basilar and right upper lobe opacities are present,


appearing changed from prior. Suspect left pleural effusion.


Ununited left proximal humerus fracture, unchanged.





IMPRESSION: 


1. Heart size stable with vasculature appearing increased suspect


for at least component of edema.


2. Scattered bilateral pulmonary opacities. May reflect


asymmetric areas of edema with infiltrate or even aspiration not


excluded. Most pronounced at the left lung base. 





  Dictated on workstation # [id number]





CT CHEST/ABDOMEN/PELVIS--PER RADIOLOGIST REPORT AT 2030





FINDINGS:





CT CHEST: The heart is mildly large. There is mild pericardial


fluid without pino effusion seen. There is calcific


atherosclerosis of the aorta. The ascending aorta is mildly


ectatic. There is a small hiatal hernia. There is coronary


atherosclerosis. No axillary adenopathy is seen. There is


airspace consolidation in the left upper lobe and the left lower


lobe at the lung base. This is new since the prior study. There


is motion artifact throughout the lungs with dependent


atelectasis. No central endobronchial lesion is seen. No acute


osseous abnormality is seen. There is an old nonunited fracture


of the proximal left humerus.





CT ABDOMEN/PELVIS: The liver demonstrates no focal lesion. There


is motion artifact. There is a calcified granuloma in the spleen.


The pancreas is mildly atrophic but otherwise unremarkable. The


adrenal glands appear normal. The kidneys are mildly atrophic


with no hydronephrosis or enhancing lesion seen. The bladder is


mildly thickened with a Marrufo catheter in place.





There is a PEG tube in the stomach. No bowel distention is seen.


The appendix appears to be normal. The colon is unremarkable. No


contrast extravasation is seen in the stomach. No free fluid or


free air is seen. No acute osseous abnormality is seen. There are


old posttraumatic deformities in the pelvis.





IMPRESSION:


1. Consolidation in the left lung, concerning for infection.


2. Mild thickening of the urinary bladder, please correlate


clinically for findings of infection. No other acute abnormality


is seen in the abdomen or pelvis.


   Reviewed:  Reviewed by Me





Departure


Impression





   Primary Impression:  


   Sepsis


   Additional Impressions:  


   Pneumonia


   POSSIBLE GI BLEED


   BROKEN FEEDING TUBE


   Dementia





Departure-Patient Inst.


Referrals:  


MEE HERNANDEZ DO (PCP/Family)


Primary Care Physician











CESARIO ECHEVARRIA DO                 Nov 10, 2023 18:17

## 2023-11-11 NOTE — HISTORY & PHYSICAL-HOSPITALIST
History of Present Illness


HPI/Chief Complaint


Patient is an 88-year-old  female known to me from previous admissions 

who presented to the emergency department due to coffee-ground emesis and 

leukocytosis.  She is unable to provide me any history and there is no family at

bedside.  History is obtained from the records.  Per the nursing home she had 

coffee-ground contents in her PEG tube today and was short of breath.  They 

reported a temperature of 103 and so labs were done which showed a leukocytosis 

of 22,000 and she was referred to the emergency department.  The ER he was found

to have a mild NICKY and persistent leukocytosis with a UTI.  She was admitted for

IV antibiotics.  Her PEG tube was also malfunction appear to be broken.  Surgery

has been consulted.


Source:  patient


Exam Limitations:  clinical condition


Date Seen


23


Time Seen by a Provider:  12:15


Attending Physician


Rigoberto Moody DO


PCP


Admitting Physician:


Vince Gandhi MD 








Attending Physician:


Vince Gandhi MD


Referring Physician





Date of Admission


Nov 10, 2023 at 21:21





Home Medications & Allergies


Home Medications


Reviewed patient Home Medication Reconciliation performed by pharmacy medication

reconciliations technician and/or nursing.


Patients Allergies have been reviewed.





Allergies





Allergies


Coded Allergies


  melatonin (Verified Allergy, Unknown, 22)


  nitrofurantoin (Verified Allergy, Unknown, 23)


  quetiapine (Verified Adverse Reaction, Unknown, CONFUSION, 22)


    confusion








Past Medical-Social-Family Hx


Patient Social History


Tobacco Use?:  No


Smokeless Tobacco Frequency:  Unknown if Ever Used


Use of E-Cig and/or Vaping dev:  Unable to obtain


Substance use?:  Unable to obtain


Alcohol Use?:  Unable to obtain


Pt feels they are or have been:  Unable to obtain





Immunizations Up To Date


Date of Influenza Vaccine:  Oct 1, 2017


First/Initial COVID19 Vaccinat:  YES


Second COVID19 Vaccination Brayden:  YES


Tetanus Booster (TDap):  Unknown


Hepatitis A:  No


Hepatitis B:  No


PED Vaccines UTD:  No


Date of Pneumonia Vaccine:  Oct 1, 2017





Seasonal Allergies


Seasonal Allergies:  No





Current Status


Advance Directives:  Unable to obtain


Advance Directive Location:  Copy placed in chart


Communicates:  Does Not Communicate


Primary Language:  English


Preferred Spoken Language:  English


Is interpretation needed?:  No


Sensory deficits:  Hearing impairment


Implanted or [employer] D:  Other





Past Medical History


Surgeries:  Abdominal


Pneumonia


Currently Using CPAP:  No


Currently Using BIPAP:  No


Atrial Fibrillation, Coronary Artery Disease, High Cholesterol, Hypertension


Dementia, Traumatic Brain Injury


GYN History:  Menopausal


Sexually Transmitted Disease:  No


HIV/AIDS:  No


UTI-Chronic


Gastroesophageal Reflux


Fractures, Gout


Hypothyroidsim


Cataract


Loss of Vision:  Denies


Hearing Impairment:  Hard of Hearing


Depression


Blood Disorders:  No


Adverse Reaction/Blood Tranf:  No





PMHx:


HTN


HLD





PSurgHx: none





Family Medical History





Dementia


  G8 SISTER


Diabetes mellitus


  19 FATHER


Diabetes (Father)








LEFT HUMERUS FRACTURE


PELVIS FRACTURES/PUBIC RAMI FRACTURES


RIB FRACTURES





PT HAD WITNESSED SEIZURE ACTIVITY 23 AND WAS STARTED ON KEPPRA HERE


IN ER.





Review of Systems


Constitutional:  see HPI





Physical Exam


Physical Exam


Vital Signs





Vital Signs - First Documented








 11/10/23 11/10/23





 18:05 18:14


 


Temp 40.3 


 


Pulse 107 


 


Resp 18 


 


B/P (MAP) 126/80 (95) 


 


Pulse Ox  92


 


O2 Delivery Nasal Cannula 


 


O2 Flow Rate 4.00 





Capillary Refill : Less Than 3 Seconds


Height, Weight, BMI


Height: 5'4.00"


Weight: 117lbs. 8.0oz. 53.173000bd; 33.74 BMI


Method:Stated


General Appearance:  No Apparent Distress, Chronically ill


Respiratory:  No Accessory Muscle Use, Crackles (right base), Decreased Breath 

Sounds; No Wheezing


Cardiovascular:  Regular Rate, Rhythm, No Murmur


Gastrointestinal:  Other (broken PEG tube)


Neurologic/Psychiatric:  Alert, Other (answered yes and no questions only and u

nsure if accurate)





Results


Results/Procedures


Labs


Laboratory Tests


23 05:23








Patient resulted labs reviewed.


Imaging


                 [facility], [place], [place]





NAME:   DARRION GARCIA


[facility] REC#:   [id number]


ACCOUNT#:   [id number]


PT STATUS:   REG ER


:   1935


PHYSICIAN:   CESARIO ECHEVARRIA DO


ADMIT DATE:   11/10/23/ER


                                  ***Signed***


Date of Exam:11/10/23





CHEST 1 VIEW, AP/PA ONLY








INDICATION: Shortness of air, fever.  





TECHNIQUE: Single view chest 7:09 PM.





CORRELATION STUDY: 2023.





FINDINGS: Heart size stable. Vasculature is increased from prior.


Perihilar, basilar and right upper lobe opacities are present,


appearing changed from prior. Suspect left pleural effusion.


Ununited left proximal humerus fracture, unchanged.





IMPRESSION: 


1. Heart size stable with vasculature appearing increased suspect


for at least component of edema.


2. Scattered bilateral pulmonary opacities. May reflect


asymmetric areas of edema with infiltrate or even aspiration not


excluded. Most pronounced at the left lung base. 





Dictated by: 





  Dictated on workstation # [id number]








Dict:   11/10/23 1932


Trans:   11/10/23 2013


[facility] [id number]





Interpreted by:     ADELFO WASHBURN DO


Electronically signed by: ADELFO WASHBURN DO 11/10/23 2013








                 [facility] [place], [place]





NAME:   DARRION GARCIA


[facility] REC#:   [id number]


ACCOUNT#:   [id number]


PT STATUS:   ADM IN


:   1935


PHYSICIAN:   CESARIO ECHEVARRIA DO


ADMIT DATE:   11/10/23/4TH


                                  ***Signed***


Date of Exam:11/10/23





CT CHEST/ABDOMEN/PELVIS W








PROCEDURE: CT chest, abdomen, and pelvis with contrast.





TECHNIQUE: Multiple contiguous axial images were obtained through


the chest, abdomen, and pelvis after the administration of


intravenous contrast. Auto Exposure Controls were utilized during


the CT exam to meet ALARA standards for radiation dose reduction.








INDICATION: Dyspnea, hypoxia, coffee-ground contents of feeding


tube, GI bleed.





COMPARISON: 2023.





FINDINGS:





CT CHEST: The heart is mildly large. There is mild pericardial


fluid without pino effusion seen. There is calcific


atherosclerosis of the aorta. The ascending aorta is mildly


ectatic. There is a small hiatal hernia. There is coronary


atherosclerosis. No axillary adenopathy is seen. There is


airspace consolidation in the left upper lobe and the left lower


lobe at the lung base. This is new since the prior study. There


is motion artifact throughout the lungs with dependent


atelectasis. No central endobronchial lesion is seen. No acute


osseous abnormality is seen. There is an old nonunited fracture


of the proximal left humerus.





CT ABDOMEN/PELVIS: The liver demonstrates no focal lesion. There


is motion artifact. There is a calcified granuloma in the spleen.


The pancreas is mildly atrophic but otherwise unremarkable. The


adrenal glands appear normal. The kidneys are mildly atrophic


with no hydronephrosis or enhancing lesion seen. The bladder is


mildly thickened with a Aguirre catheter in place.





There is a PEG tube in the stomach. No bowel distention is seen.


The appendix appears to be normal. The colon is unremarkable. No


contrast extravasation is seen in the stomach. No free fluid or


free air is seen. No acute osseous abnormality is seen. There are


old posttraumatic deformities in the pelvis.





IMPRESSION:


1. Consolidation in the left lung, concerning for infection.


2. Mild thickening of the urinary bladder, please correlate


clinically for findings of infection. No other acute abnormality


is seen in the abdomen or pelvis. 





Dictated by: 





  Dictated on workstation # [id number]








Dict:   11/10/23 1945


Trans:   11/10/23 2240


[facility] [id number]





Interpreted by:     CHRIS BLAIR MD


Electronically signed by: CHRIS BLAIR MD 11/10/23 2240





Assessment/Plan


Admission Diagnosis


Sepsis


Admission Status:  Inpatient Order (span 2 midnights)


Reason for Inpatient Admission:  


see below





Assessment and Plan


Severe Sepsis due to UTI and aspiration PNA- POA


NICKY


   Continue IV abx


   Await cultures


   NPO


   Wean oxygen as able





Anemia with coffee ground emesis


Dysphagia


PEG tube malfunction


   NPO


   Continue tube feeds as able per NH orders


   Surgery consulted, appreciate recs


   PPI





HTN


HLD


Cerebellar ataxia


Dementia


Chronic debility


Advanced age


   Chronic, stable


   Resides in NH


   Continue home meds as able





DVT prophylaxis: VINCE Marroquin MD              2023 12:22

## 2023-11-11 NOTE — DIAGNOSTIC IMAGING REPORT
INDICATION: Dyspnea, follow-up pneumonia.



COMPARISON: 11/10/2023.



DISCUSSION: Single portable upright view of the chest was

obtained. Improved aeration of the left lung base. Some

infiltrates remain within the left lung base and throughout the

right lung. Old right rib fractures are noted. Stable heart size.

No pleural fluid or pneumothorax. Ununited proximal left humeral

shaft fracture again noted.



IMPRESSION:

1. Improved aeration of the left lung base. Infiltrates within

the right lung are stable.



Dictated by: 



  Dictated on workstation # [id number]

## 2023-11-12 NOTE — PROGRESS NOTE - HOSPITALIST
Subjective


HPI/CC On Admission


Date Seen by Provider:  Nov 12, 2023


Patient is an 88-year-old  female known to me from previous admissions 

who presented to the emergency department due to coffee-ground emesis and 

leukocytosis.  She is unable to provide me any history and there is no family at

bedside.  History is obtained from the records.  Per the nursing home she had 

coffee-ground contents in her PEG tube today and was short of breath.  They 

reported a temperature of 103 and so labs were done which showed a leukocytosis 

of 22,000 and she was referred to the emergency department.  The ER he was found

to have a mild NICKY and persistent leukocytosis with a UTI.  She was admitted for

IV antibiotics.  Her PEG tube was also malfunction appear to be broken.  Surgery

has been consulted.


Subjective/Events-last exam


Pt more alert today. Answered a few questions seemingly appropriate. Updated her

DPOA over the phone.





Focused Exam


Lactate Level


11/10/23 05:25: Lactic Acid Level 1.20


11/10/23 18:10: Lactic Acid Level 1.76








Objective


Exam


Vital Signs





Vital Signs








  Date Time  Temp Pulse Resp B/P (MAP) Pulse Ox O2 Delivery O2 Flow Rate FiO2


 


11/12/23 11:42 36.5 82 20 177/80 (112) 97 Nasal Cannula 4.00 





Capillary Refill : Less Than 3 Seconds


General Appearance:  No Apparent Distress, Chronically ill, Thin


Respiratory:  Lungs Clear, No Respiratory Distress


Cardiovascular:  Regular Rate, Rhythm, No Murmur


Gastrointestinal:  Normal Bowel Sounds, Soft


Neurologic/Psychiatric:  Alert, Other (aphasia)





Results/Procedures


Lab


Laboratory Tests


11/12/23 05:23








Patient resulted labs reviewed.





Assessment/Plan


Assessment and Plan


Assess & Plan/Chief Complaint


Severe Sepsis due to UTI and aspiration PNA- POA


NICKY


   Continue IV abx


   Await cultures still


   NPO for known dysphagia


   Wean oxygen as able





Anemia with coffee ground emesis


Dysphagia


PEG tube malfunction


   NPO


   Continue tube feeds as able per NH orders 


   Surgery consulted, appreciate recs


   PPI





HTN


HLD


Cerebellar ataxia


Dementia


Chronic debility


Advanced age


   Chronic, stable


   Resides in NH


   Continue home meds as able





DVT prophylaxis: VINCE Marroquin MD              Nov 12, 2023 12:29

## 2023-11-12 NOTE — PROGRESS NOTE - SURGERY
Subjective


Time Seen by a Provider:  12:07


Subjective/Events-last exam


Pt seen and examined, appears comfortable and there is no family in the room.


Review of Systems


unable to obtain, pt non-verbal





Focused Exam


Lactate Level


11/10/23 05:25: Lactic Acid Level 1.20


11/10/23 18:10: Lactic Acid Level 1.76





Objective


Exam





Vital Signs








  Date Time  Temp Pulse Resp B/P (MAP) Pulse Ox O2 Delivery O2 Flow Rate FiO2


 


11/12/23 13:23  82      


 


11/12/23 11:42 36.5 82 20 177/80 (112) 97 Nasal Cannula 4.00 


 


11/12/23 09:22     90 Nasal Cannula 4.00 


 


11/12/23 08:24     94 Nasal Cannula 4.00 


 


11/12/23 08:15 36.6 76 20 160/72 (101) 94 Nasal Cannula 4.00 


 


11/12/23 07:01  79      


 


11/12/23 07:00  62      


 


11/12/23 03:25 36.8 79 24 127/78 (94) 95 Nasal Cannula 4.00 





       4.00 


 


11/12/23 03:09     95 Nasal Cannula 4.00 


 


11/12/23 01:00  76      


 


11/11/23 23:49 37.4 84 24 148/65 (92) 97 Nasal Cannula 4.00 


 


11/11/23 22:19     94 Nasal Cannula 4.00 


 


11/11/23 20:04      Nasal Cannula 4.00 


 


11/11/23 19:47 37.0 81 17 134/71 (92) 96 Nasal Cannula 4.00 


 


11/11/23 19:00  88      


 


11/11/23 17:14 37.0 83 18 137/63 (87) 97 Nasal Cannula 4.00 


 


11/11/23 17:14 36.4       


 


11/11/23 16:00 37.0 83 18 137/63 (87) 97 Nasal Cannula 4.00 














I & O 


 


 11/12/23





 06:59


 


Intake Total 1200 ml


 


Output Total 1475 ml


 


Balance -275 ml





Capillary Refill : Less Than 3 Seconds


General Appearance:  No Apparent Distress, Chronically ill, Thin


Respiratory:  Lungs Clear, No Respiratory Distress


Cardiovascular:  Regular Rate, Rhythm, No Murmur


Gastrointestinal:  soft, no organomegaly, other (Peg tube, one port is broken 

but accessible.  )


Neurologic/Psychiatric:  Other (aphasia)





Results


Lab


Laboratory Tests


11/12/23 05:23: 


White Blood Count 11.9H, Red Blood Count 2.78L, Hemoglobin 8.2L, Hematocrit 25L,

Mean Corpuscular Volume 91, Mean Corpuscular Hemoglobin 30, Mean Corpuscular 

Hemoglobin Concent 32, Red Cell Distribution Width 16.9H, Platelet Count 225, 

Mean Platelet Volume 9.1, Immature Granulocyte % (Auto) 0, Neutrophils (%) 

(Auto) 79H, Lymphocytes (%) (Auto) 12, Monocytes (%) (Auto) 8, Eosinophils (%) 

(Auto) 1, Basophils (%) (Auto) 0, Neutrophils # (Auto) 9.4H, Lymphocytes # (A

uto) 1.4, Monocytes # (Auto) 1.0, Eosinophils # (Auto) 0.1, Basophils # (Auto) 

0.0, Immature Granulocyte # (Auto) 0.0, Sodium Level 134L, Potassium Level 3.4L,

Chloride Level 102, Carbon Dioxide Level 24, Anion Gap 8, Blood Urea Nitrogen 

22H, Creatinine 0.88, Estimat Glomerular Filtration Rate 63, BUN/Creatinine 

Ratio 25, Glucose Level 72, Calcium Level 8.9, Corrected Calcium 9.9, Total 

Bilirubin 0.6, Aspartate Amino Transf (AST/SGOT) 20, Alanine Aminotransferase 

(ALT/SGPT) 14, Alkaline Phosphatase 58, Total Protein 5.7L, Albumin 2.7L





Microbiology


11/10/23 Urine Culture - Preliminary, Resulted


           Gram Negative Bacillus 1


11/10/23 Blood Culture - Preliminary, Resulted


           Staphylococcus epidermidis





Assessment/Plan


Anemia


G-tube problems


UTI


Leukocytosis - improved, but unknown cause





I spoke with Hospitalist who states family is fine keeping her comfortable, 

monitoring labs and not doing anything heroic.  Therefore, I would leave the G-

tube alone, it is accessible and can work for tube feeds (the male/female 

connector in the access works).  She was started on ABX, would continue and 

await culture results.











FADUMO ARITA DO               Nov 12, 2023 14:50

## 2023-11-13 NOTE — OCC THERAPY PROGRESS NOTE
Therapy Progress Note


Patient is dependent PLOF with all gross motor skills at NH.  No skilled OT 

indicated.  Patient currently lethargic and moans with any 

movement/intervention.  Physician and SW notified.











DRINNEN,MICHELLE OT            Nov 13, 2023 11:46

## 2023-11-13 NOTE — PROGRESS NOTE - SURGERY
Subjective


Time Seen by a Provider:  11:49


Subjective/Events-last exam


Pt seen and examined, she is actually a little alert today.  I asked her if she 

was in any pain and she said no.  Nurse stated that when she charles back on G-tube

there was no black liquid.


Review of Systems


Pulmonary:  No Dyspnea, No Cough


Gastrointestinal:  No: Nausea, Vomiting, Abdominal Pain


unable to obtain, pt non-verbal





Focused Exam


Lactate Level


11/10/23 18:10: Lactic Acid Level 1.76





Objective


Exam





Vital Signs








  Date Time  Temp Pulse Resp B/P (MAP) Pulse Ox O2 Delivery O2 Flow Rate FiO2


 


11/13/23 13:06  93      


 


11/13/23 11:11 36.1 74 18 140/65 (90) 98 Nasal Cannula 4.00 


 


11/13/23 08:00      Nasal Cannula 4.00 


 


11/13/23 07:30  65      


 


11/13/23 07:19 36.2 68 20 145/70 (95) 95 Nasal Cannula 4.00 


 


11/13/23 03:09 36.4 68 18 140/68 (92) 98 Nasal Cannula 4.00 





       4.00 


 


11/13/23 01:00  56      


 


11/12/23 23:49 36.5 72 18 158/71 (100) 92 Nasal Cannula 4.00 





       4.00 


 


11/12/23 21:21     93 Nasal Cannula 4.00 


 


11/12/23 19:45      Nasal Cannula 4.00 


 


11/12/23 19:39 36.6 74 18 156/68 (97) 99 Nasal Cannula 4.00 


 


11/12/23 19:00  74      


 


11/12/23 15:55 37.2 87 20 132/88 (103)  Nasal Cannula 4.00 














I & O 


 


 11/13/23





 06:59


 


Intake Total 1100 ml


 


Output Total 1175 ml


 


Balance -75 ml





Capillary Refill : Less Than 3 Seconds


General Appearance:  No Apparent Distress, Chronically ill, Thin


Respiratory:  Lungs Clear, Normal Breath Sounds, No Accessory Muscle Use, No 

Respiratory Distress


Cardiovascular:  Regular Rate, Rhythm, No Murmur


Gastrointestinal:  soft, no organomegaly, other (Peg tube, one port is broken 

but accessible.  )


Neurologic/Psychiatric:  Other (aphasia)





Results


Lab


Laboratory Tests


11/13/23 05:08: 


White Blood Count 8.9, Red Blood Count 2.89L, Hemoglobin 8.3L, Hematocrit 27L, 

Mean Corpuscular Volume 92, Mean Corpuscular Hemoglobin 29, Mean Corpuscular 

Hemoglobin Concent 31L, Red Cell Distribution Width 16.6H, Platelet Count 223, 

Mean Platelet Volume 9.5, Immature Granulocyte % (Auto) 0, Neutrophils (%) 

(Auto) 73, Lymphocytes (%) (Auto) 16, Monocytes (%) (Auto) 9, Eosinophils (%) 

(Auto) 3, Basophils (%) (Auto) 0, Neutrophils # (Auto) 6.5, Lymphocytes # (Auto)

1.4, Monocytes # (Auto) 0.8, Eosinophils # (Auto) 0.2, Basophils # (Auto) 0.0, 

Immature Granulocyte # (Auto) 0.0, Sodium Level 138, Potassium Level 3.2L, 

Chloride Level 103, Carbon Dioxide Level 23, Anion Gap 12, Blood Urea Nitrogen 

19H, Creatinine 0.74, Estimat Glomerular Filtration Rate 78, BUN/Creatinine 

Ratio 26, Glucose Level 53*L, Calcium Level 9.0, Corrected Calcium 10.0, Total 

Bilirubin 0.5, Aspartate Amino Transf (AST/SGOT) 17, Alanine Aminotransferase 

(ALT/SGPT) 14, Alkaline Phosphatase 56, Total Protein 5.7L, Albumin 2.7L


11/13/23 07:17: Glucometer 107


11/13/23 08:08: 


11/13/23 12:12: Glucometer 85





Microbiology


11/10/23 Urine Culture - Final, Complete


           Proteus mirabilis


11/10/23 Blood Culture - Final, Complete


           Staphylococcus epidermidis


           No Susceptibility Performed


           See Comments





Assessment/Plan


Anemia


UTI


G-tube problems


Pneumonia





Anemia - CBC to check Hgb status. EGD not indicated at this time due to risks 

outweighing the benefits


UTI -continue the antibiotics, await results of the urine culture + sensitivity 

to see if changing Abx is necessary


G-tube problems -restarted the food through the tube today, I went over 

accessing it with the nurse


Pneumonia -continue abx, try to wean pt off the oxygen and monitor O2 stats





I will sign off and can reconsult if needed.











FADUMO ARITA DO               Nov 13, 2023 13:47

## 2023-11-13 NOTE — PHYSICAL THERAPY PROGRESS NOTE
Therapy Progress Note


Patient is dependent PLOF with all gross motor skills at NH.  No skilled PT 

indicated.  Patient currently lethargic and moans with any 

movement/intervention.  Physician and SW notified.











NICKI DERAS PT              Nov 13, 2023 10:02

## 2023-11-13 NOTE — PROGRESS NOTE
URIEL SKY 11/13/23 1212:


Subjective


Date Seen by a Provider:  Nov 13, 2023


Subjective/Events-last exam


Pt was seen today and was nonverbal. Family was not in the room.





Focused Exam


Lactate Level


11/10/23 18:10: Lactic Acid Level 1.76





Objective


Exam


Last Set of Vital Signs





Vital Signs








  Date Time  Temp Pulse Resp B/P (MAP) Pulse Ox O2 Delivery O2 Flow Rate FiO2


 


11/13/23 08:00      Nasal Cannula 4.00 


 


11/13/23 07:30  65      


 


11/13/23 07:19 36.2  20 145/70 (95) 95   





Capillary Refill : Less Than 3 Seconds


I&O











Intake and Output 


 


 11/12/23





 23:59


 


Intake Total 1100 ml


 


Output Total 1400 ml


 


Balance -300 ml


 


 


 


Intake Oral 0 ml


 


IV Total 1100 ml


 


Output Urine Total 1400 ml








General:  Alert, Cooperative


HEENT:  PERRLA, EOMI


Lungs:  Clear to Auscultation, Normal Air Movement


Heart:  Regular Rate, No Murmurs


Abdomen:  Normal Bowel Sounds, Soft, No Tenderness


Extremities:  No Clubbing, No Cyanosis, Normal Pulses (2+ b/l radial and 

posterior tibialis)





Results


Lab


Laboratory Tests


11/13/23 05:08: 


White Blood Count 8.9, Red Blood Count 2.89L, Hemoglobin 8.3L, Hematocrit 27L, 

Mean Corpuscular Volume 92, Mean Corpuscular Hemoglobin 29, Mean Corpuscular 

Hemoglobin Concent 31L, Red Cell Distribution Width 16.6H, Platelet Count 223, 

Mean Platelet Volume 9.5, Immature Granulocyte % (Auto) 0, Neutrophils (%) 

(Auto) 73, Lymphocytes (%) (Auto) 16, Monocytes (%) (Auto) 9, Eosinophils (%) 

(Auto) 3, Basophils (%) (Auto) 0, Neutrophils # (Auto) 6.5, Lymphocytes # (Auto)

1.4, Monocytes # (Auto) 0.8, Eosinophils # (Auto) 0.2, Basophils # (Auto) 0.0, 

Immature Granulocyte # (Auto) 0.0, Sodium Level 138, Potassium Level 3.2L, 

Chloride Level 103, Carbon Dioxide Level 23, Anion Gap 12, Blood Urea Nitrogen 

19H, Creatinine 0.74, Estimat Glomerular Filtration Rate 78, BUN/Creatinine 

Ratio 26, Glucose Level 53*L, Calcium Level 9.0, Corrected Calcium 10.0, Total 

Bilirubin 0.5, Aspartate Amino Transf (AST/SGOT) 17, Alanine Aminotransferase 

(ALT/SGPT) 14, Alkaline Phosphatase 56, Total Protein 5.7L, Albumin 2.7L


11/13/23 07:17: Glucometer 107


11/13/23 08:08: 





Microbiology


11/10/23 Urine Culture - Preliminary, Resulted


           Proteus mirabilis


11/10/23 Blood Culture - Final, Complete


           Staphylococcus epidermidis


           No Susceptibility Performed


           See Comments





Assessment/Plan


Assessment/Plan


Assess & Plan/Chief Complaint


Assessment:


Anemia


UTI


G-tube problems


Pneumonia





Plan:


Anemia


-CBC to check Hgb status. Iron will be ordered to check level.


-EGD not indicated at this time due to risks outweighing the benefits





UTI


-continue the antibiotics


-will review the results of the urine culture + sensitivity to see if changing 

Abx is necessary





G-tube problems


-restarted the food through the tube today





Pneumonia


-continue abx


-being to wean pt off the oxygen and monitor O2 stats





VINCE GANDHI MD 11/13/23 2012:


Assessment/Plan


Assessment/Plan


Assess & Plan/Chief Complaint








Severe Sepsis due to UTI and aspiration PNA- POA


NICKY


   Continue IV abx


   culture growing proteus- await sensitivities


   NPO for known dysphagia


   Wean oxygen as able


   Updated her DPOA Nikki today for hopeful plan to DC tomorrow





Anemia with coffee ground emesis


Dysphagia


PEG tube malfunction


   NPO


   Continue tube feeds as able per NH orders 


   Surgery consulted, appreciate recs


   PPI





HTN


HLD


Cerebellar ataxia


Dementia


Chronic debility


Advanced age


   Chronic, stable


   Resides in NH


   Continue home meds as able





DVT prophylaxis: SCDs





Supervisory-Addendum Brief


Verification & Attestation


Participated in pt care:  history, MDM, physical


Personally performed:  exam, history, MDM, supervision of care


Care discussed with:  Medical Student


Procedures:  n/a


Results interpretation:  Verified all documentation


Verification and Attestation of Medical Student E/M Service





A medical student performed and documented this service in my presence. I 

reviewed and verified all information documented by the medical student and made

modifications to such information, when appropriate. I personally performed the 

physical exam and medical decision making. 





 Vince Gandhi, Nov 13, 2023,20:10











URIEL SKY             Nov 13, 2023 12:12


VINCE GANDHI MD              Nov 13, 2023 20:12 45

## 2023-11-13 NOTE — DISCHARGE INST-SIMPLE/STANDARD
Discharge Inst-Standard


Patient Instructions/Follow Up


Plan of Care/Instructions/FU:  


Please continue to take your medications as written. Please follow up with


your primary care doctor to follow up this hospital stay.


Activity as Tolerated:  Yes


Discharge Diet:  Other Diet (Tube Feeds 4 cans of Jevity with 150cc FWF each)


Return to The Hospital For:  


Please bring in for evaluation if she develops shortness of breath,


wheezing, abd pain, chest pain, weakness, confusion, or if she appears to


be doing worse.











VINCE URBINA MD              Nov 13, 2023 12:59

## 2023-11-14 NOTE — PHYSICIAN QUERY-FINAL DX
SUGAR CARDOSO 11/14/23 0924:


Final Diagnosis


Give Final Diagnosis


Please give Final Diagnosis


The medical record reflects the following clinical evidence:





Clinical Indicators: RR 18 on admission has mostly been 20 and above highest 

recorded was 24, O2 sat admission was 92% this was on 4 L (P/F=181), did 

decrease to 88% at one-point on 4 L (P/F= 153) O2 sats frequently 92 to 93% 

while on oxygen, has been on 02 continuously since admission, but has been 

weaned down to 2 L oxygen, Documentation of SOA at Rest on admission and 

ongoing. 


Risk Factor(s): Advanced age, Aspiration PNA and Sepsis, no indication of 02 use

prior to hospitalization documentation of attempting to wean Pt off 02 


Treatment: Supplemental 02 up to 4L, Respiratory monitoring, Weaning 02 





Acute respiratory failure with hypoxia, present on admission


Other explanation of clinical findings


Unable to determine (no explanation for clinical findings)





Please clarify and document your clinical opinion in the progress notes and 

discharge summary including the definitive and/or presumptive diagnosis, 

(suspected or probable), related to the above clinical findings. Please include 

clinical findings supporting your diagnosis.





Sugar Cardoso, MSN, RN


Clinical 


[phone]


[email or web address]





VINCE URBINA MD 11/14/23 1214:


Final Diagnosis


Give Final Diagnosis


Acute respiratory failure- RHIANNAJASE











SUGAR CARDOSO                   Nov 14, 2023 09:24


VINCE URBINA MD              Nov 14, 2023 12:14

## 2023-11-14 NOTE — DISCHARGE SUMMARY
Diagnosis/Chief Complaint


Date of Admission


Nov 10, 2023 at 21:21


Date of Discharge





Discharge Date:  Nov 13, 2023


Admission Diagnosis


Sepsis


Primary Care


Rigoberto Moody DO





Discharge Summary


Discharge Physical Exam


Allergies:  


Coded Allergies:  


     melatonin (Verified  Allergy, Unknown, 5/22/22)


     nitrofurantoin (Verified  Allergy, Unknown, 7/31/23)


     quetiapine (Verified  Adverse Reaction, Unknown, CONFUSION, 11/4/22)


   confusion


Vitals & I&Os





Vital Signs








  Date Time  Temp Pulse Resp B/P (MAP) Pulse Ox O2 Delivery O2 Flow Rate FiO2


 


11/14/23 14:39 35.8 78 16 136/70 93 Nasal Cannula 2.00 








General Appearance:  No Apparent Distress, Chronically ill, Thin


Cardiovascular:  Regular Rate, Rhythm


Gastrointestinal:  Soft, Other (PEG)


Neurologic/Psychiatric:  Alert, Other (expressive aphasia)





Hospital Course


Patient was admitted to the hospital secondary to sepsis due to urinary tract 

infection and questionable pneumonia.  She also had coffee-ground contents in 

her PEG tube.  Surgery was consulted and she was treated with PPI.  Her 

hemoglobin improved so EGD was deferred.  She was treated with IV antibiotics 

and that improved as well.  She was discharged back to her nursing home in 

stable improved condition to follow-up with her primary care physician.


Labs (last 24 hrs)





Microbiology


11/10/23 Urine Culture - Final, Complete


           Proteus mirabilis


11/10/23 Blood Culture - Final, Complete


           Staphylococcus epidermidis


           No Susceptibility Performed


           See Comments


Patient resulted labs reviewed.


Pending Labs








Discussion & Recommendations


Discharge Planning:  >30 minutes discharge planning





Discharge


Home Medications:





Active Scripts


Active


Cephalexin 500 Mg Tablet 500 Mg PO BID


Reported


Albuterol Sulfate 2.5 Mg/3 Ml (0.083 %) Vial.neb 1 Vial INH Q6H PRN


Transderm-Scop (Scopolamine) 1 Mg/3 Day Patch.td72 1 Each TD Q72H


Protonix (Pantoprazole Sodium) 40 Mg Granpkt.dr 40 Mg PEG 0930,2130


     RINSE WITH 10ML OF APPLE JUICE


Constulose (Lactulose) 10 Gram/15 Ml Solution 15 Ml PEG BID PRN


Acetaminophen 325 Mg Tablet 650 Mg PEG 0000,0800,1600


     TAKES 2 (325MG) TAB


Vitamin D3 (Cholecalciferol (Vitamin D3)) 125 Mcg/Ml (5000 Unit/Ml) Drops 0.5 Ml

PEG HS


Lidocaine 5% Patch (Lidocaine) 5 % Adh..patch 1 Each TP DAILY


     APPLY TO UPPER LEFT ARM


Metamucil Packet (Psyllium Husk (with Sugar)) 3.4 Gram Powd.pack 3.4 Gm PEG BID


Ondansetron HCl 4 Mg Tablet 4 Mg PEG Q8H PRN


[id number] (Polyethylene Glycol 3350) 17 Gram/Dose Powder 17 Gm PEG DAILY


Ocusoft Lid Scrub (Eyelid Cleanser Combination #5) 1 Each Med..pad 1 Each TP ELISA

LY


Vitamin C (Ascorbic Acid) 1,000 Mg Tablet 1,000 Mg PEG DAILY


     TAKES 2 (500MG) TABS


Allopurinol 100 Mg Tablet 100 Mg PEG DAILY


Refresh Tears (Carboxymethylcellulose Sodium) 0.5 % Drops 1 Drop OU QID


Aspirin 81 Mg Tab.chew 81 Mg PEG HS


Amlodipine Besylate 5 Mg Tablet 5 Mg PEG DAILY


     HOLD IF SBP <100 OR PULSE <60





Instructions to patient/family


Please see electronic discharge instructions given to patient.











VINCE URBINA MD              Nov 14, 2023 09:21